# Patient Record
Sex: FEMALE | Race: BLACK OR AFRICAN AMERICAN | Employment: FULL TIME | URBAN - METROPOLITAN AREA
[De-identification: names, ages, dates, MRNs, and addresses within clinical notes are randomized per-mention and may not be internally consistent; named-entity substitution may affect disease eponyms.]

---

## 2017-01-07 ENCOUNTER — TELEPHONE (OUTPATIENT)
Dept: PRIMARY CARE CLINIC | Age: 47
End: 2017-01-07

## 2017-01-07 DIAGNOSIS — I15.9 SECONDARY HYPERTENSION, UNSPECIFIED: ICD-10-CM

## 2017-01-07 RX ORDER — LISINOPRIL AND HYDROCHLOROTHIAZIDE 20; 12.5 MG/1; MG/1
2 TABLET ORAL DAILY
Qty: 90 TABLET | Refills: 2 | Status: SHIPPED
Start: 2017-01-07 | End: 2017-01-09 | Stop reason: SDUPTHER

## 2017-01-09 ENCOUNTER — OFFICE VISIT (OUTPATIENT)
Dept: INTERNAL MEDICINE CLINIC | Age: 47
End: 2017-01-09

## 2017-01-09 VITALS
OXYGEN SATURATION: 98 % | BODY MASS INDEX: 43.41 KG/M2 | WEIGHT: 247 LBS | SYSTOLIC BLOOD PRESSURE: 132 MMHG | HEART RATE: 96 BPM | DIASTOLIC BLOOD PRESSURE: 92 MMHG

## 2017-01-09 DIAGNOSIS — J30.89 PERENNIAL ALLERGIC RHINITIS, UNSPECIFIED ALLERGIC RHINITIS TRIGGER: ICD-10-CM

## 2017-01-09 DIAGNOSIS — I10 ESSENTIAL HYPERTENSION: Primary | ICD-10-CM

## 2017-01-09 PROCEDURE — 99213 OFFICE O/P EST LOW 20 MIN: CPT | Performed by: INTERNAL MEDICINE

## 2017-01-09 RX ORDER — LISINOPRIL AND HYDROCHLOROTHIAZIDE 20; 12.5 MG/1; MG/1
1 TABLET ORAL DAILY
Qty: 90 TABLET | Refills: 2 | Status: SHIPPED | OUTPATIENT
Start: 2017-01-09 | End: 2017-02-20 | Stop reason: SINTOL

## 2017-01-09 RX ORDER — FLUTICASONE PROPIONATE 50 MCG
1 SPRAY, SUSPENSION (ML) NASAL DAILY
Qty: 1 BOTTLE | Refills: 5 | Status: SHIPPED | OUTPATIENT
Start: 2017-01-09 | End: 2017-08-14 | Stop reason: SDUPTHER

## 2017-01-18 ENCOUNTER — OFFICE VISIT (OUTPATIENT)
Dept: NEUROLOGY | Age: 47
End: 2017-01-18

## 2017-01-18 VITALS
HEART RATE: 107 BPM | WEIGHT: 249 LBS | BODY MASS INDEX: 44.12 KG/M2 | DIASTOLIC BLOOD PRESSURE: 94 MMHG | HEIGHT: 63 IN | SYSTOLIC BLOOD PRESSURE: 144 MMHG

## 2017-01-18 DIAGNOSIS — G93.2 PSEUDOTUMOR CEREBRI: Primary | ICD-10-CM

## 2017-01-18 PROCEDURE — 99213 OFFICE O/P EST LOW 20 MIN: CPT | Performed by: PSYCHIATRY & NEUROLOGY

## 2017-01-30 ENCOUNTER — OFFICE VISIT (OUTPATIENT)
Dept: INTERNAL MEDICINE CLINIC | Age: 47
End: 2017-01-30

## 2017-01-30 VITALS
SYSTOLIC BLOOD PRESSURE: 130 MMHG | OXYGEN SATURATION: 98 % | DIASTOLIC BLOOD PRESSURE: 84 MMHG | BODY MASS INDEX: 44.11 KG/M2 | HEART RATE: 94 BPM | WEIGHT: 249 LBS

## 2017-01-30 DIAGNOSIS — J30.2 SEASONAL ALLERGIC RHINITIS, UNSPECIFIED ALLERGIC RHINITIS TRIGGER: ICD-10-CM

## 2017-01-30 DIAGNOSIS — I10 ESSENTIAL HYPERTENSION: ICD-10-CM

## 2017-01-30 DIAGNOSIS — R00.2 PALPITATIONS: Primary | ICD-10-CM

## 2017-01-30 PROCEDURE — 99214 OFFICE O/P EST MOD 30 MIN: CPT | Performed by: INTERNAL MEDICINE

## 2017-01-30 RX ORDER — PREDNISONE 20 MG/1
20 TABLET ORAL DAILY
Qty: 5 TABLET | Refills: 0 | Status: SHIPPED | OUTPATIENT
Start: 2017-01-30 | End: 2017-02-04

## 2017-01-30 ASSESSMENT — ENCOUNTER SYMPTOMS
FACIAL SWELLING: 1
COUGH: 1
CHEST TIGHTNESS: 0
SORE THROAT: 0
RHINORRHEA: 1
CHOKING: 0
SINUS PRESSURE: 1

## 2017-02-08 ENCOUNTER — OFFICE VISIT (OUTPATIENT)
Dept: CARDIOLOGY CLINIC | Age: 47
End: 2017-02-08

## 2017-02-08 VITALS
OXYGEN SATURATION: 97 % | BODY MASS INDEX: 44.47 KG/M2 | WEIGHT: 251 LBS | SYSTOLIC BLOOD PRESSURE: 130 MMHG | DIASTOLIC BLOOD PRESSURE: 80 MMHG | HEIGHT: 63 IN | HEART RATE: 98 BPM

## 2017-02-08 DIAGNOSIS — R00.2 PALPITATIONS: Primary | ICD-10-CM

## 2017-02-08 DIAGNOSIS — R06.83 SNORES: ICD-10-CM

## 2017-02-08 DIAGNOSIS — I10 ESSENTIAL HYPERTENSION: ICD-10-CM

## 2017-02-08 DIAGNOSIS — I49.9 IRREGULAR HEART BEAT: ICD-10-CM

## 2017-02-08 DIAGNOSIS — E78.5 HYPERLIPIDEMIA, UNSPECIFIED HYPERLIPIDEMIA TYPE: ICD-10-CM

## 2017-02-08 PROCEDURE — 99244 OFF/OP CNSLTJ NEW/EST MOD 40: CPT | Performed by: INTERNAL MEDICINE

## 2017-02-08 PROCEDURE — 93000 ELECTROCARDIOGRAM COMPLETE: CPT | Performed by: INTERNAL MEDICINE

## 2017-02-14 PROCEDURE — 93224 XTRNL ECG REC UP TO 48 HRS: CPT | Performed by: INTERNAL MEDICINE

## 2017-02-17 ENCOUNTER — TELEPHONE (OUTPATIENT)
Dept: INTERNAL MEDICINE CLINIC | Age: 47
End: 2017-02-17

## 2017-02-20 RX ORDER — LOSARTAN POTASSIUM AND HYDROCHLOROTHIAZIDE 25; 100 MG/1; MG/1
1 TABLET ORAL DAILY
Qty: 90 TABLET | Refills: 3 | Status: CANCELLED | OUTPATIENT
Start: 2017-02-20

## 2017-02-27 ENCOUNTER — OFFICE VISIT (OUTPATIENT)
Dept: CARDIOLOGY CLINIC | Age: 47
End: 2017-02-27

## 2017-02-27 VITALS
SYSTOLIC BLOOD PRESSURE: 118 MMHG | HEART RATE: 89 BPM | DIASTOLIC BLOOD PRESSURE: 78 MMHG | WEIGHT: 250 LBS | BODY MASS INDEX: 44.29 KG/M2

## 2017-02-27 DIAGNOSIS — I10 ESSENTIAL HYPERTENSION: ICD-10-CM

## 2017-02-27 DIAGNOSIS — E78.5 HYPERLIPIDEMIA, UNSPECIFIED HYPERLIPIDEMIA TYPE: ICD-10-CM

## 2017-02-27 DIAGNOSIS — I49.3 PVC (PREMATURE VENTRICULAR CONTRACTION): ICD-10-CM

## 2017-02-27 DIAGNOSIS — R00.2 PALPITATIONS: Primary | ICD-10-CM

## 2017-02-27 PROCEDURE — 93000 ELECTROCARDIOGRAM COMPLETE: CPT | Performed by: INTERNAL MEDICINE

## 2017-02-27 PROCEDURE — 99214 OFFICE O/P EST MOD 30 MIN: CPT | Performed by: INTERNAL MEDICINE

## 2017-03-15 RX ORDER — LOSARTAN POTASSIUM AND HYDROCHLOROTHIAZIDE 25; 100 MG/1; MG/1
1 TABLET ORAL DAILY
Qty: 90 TABLET | Refills: 3 | Status: SHIPPED | OUTPATIENT
Start: 2017-03-15 | End: 2018-05-01 | Stop reason: SDUPTHER

## 2017-03-30 ENCOUNTER — OFFICE VISIT (OUTPATIENT)
Dept: INTERNAL MEDICINE CLINIC | Age: 47
End: 2017-03-30

## 2017-03-30 VITALS
BODY MASS INDEX: 44.11 KG/M2 | HEART RATE: 98 BPM | SYSTOLIC BLOOD PRESSURE: 142 MMHG | WEIGHT: 249 LBS | OXYGEN SATURATION: 99 % | DIASTOLIC BLOOD PRESSURE: 92 MMHG

## 2017-03-30 DIAGNOSIS — R05.9 COUGH: ICD-10-CM

## 2017-03-30 DIAGNOSIS — R00.2 PALPITATIONS: Primary | ICD-10-CM

## 2017-03-30 PROCEDURE — 99213 OFFICE O/P EST LOW 20 MIN: CPT | Performed by: INTERNAL MEDICINE

## 2017-03-30 RX ORDER — LEVOFLOXACIN 500 MG/1
500 TABLET, FILM COATED ORAL DAILY
Qty: 7 TABLET | Refills: 0 | Status: SHIPPED | OUTPATIENT
Start: 2017-03-30 | End: 2017-04-06

## 2017-03-30 RX ORDER — BENZONATATE 100 MG/1
100 CAPSULE ORAL 3 TIMES DAILY PRN
Qty: 21 CAPSULE | Refills: 0 | Status: SHIPPED | OUTPATIENT
Start: 2017-03-30 | End: 2017-04-06

## 2017-03-31 ASSESSMENT — ENCOUNTER SYMPTOMS
NAUSEA: 0
COUGH: 1
CHOKING: 0
CHEST TIGHTNESS: 0

## 2017-04-18 ENCOUNTER — TELEPHONE (OUTPATIENT)
Dept: INTERNAL MEDICINE CLINIC | Age: 47
End: 2017-04-18

## 2017-04-18 PROBLEM — J45.30 MILD PERSISTENT ASTHMA: Status: ACTIVE | Noted: 2017-04-18

## 2017-05-18 ENCOUNTER — OFFICE VISIT (OUTPATIENT)
Dept: PULMONOLOGY | Age: 47
End: 2017-05-18

## 2017-05-18 VITALS
RESPIRATION RATE: 18 BRPM | DIASTOLIC BLOOD PRESSURE: 92 MMHG | HEART RATE: 95 BPM | SYSTOLIC BLOOD PRESSURE: 159 MMHG | HEIGHT: 63 IN | BODY MASS INDEX: 44.3 KG/M2 | OXYGEN SATURATION: 99 % | WEIGHT: 250 LBS

## 2017-05-18 DIAGNOSIS — R05.3 CHRONIC COUGH: Primary | ICD-10-CM

## 2017-05-18 DIAGNOSIS — G47.33 OSA (OBSTRUCTIVE SLEEP APNEA): ICD-10-CM

## 2017-05-18 DIAGNOSIS — R94.2 ABNORMAL PFT: ICD-10-CM

## 2017-05-18 DIAGNOSIS — K21.9 GASTROESOPHAGEAL REFLUX DISEASE, ESOPHAGITIS PRESENCE NOT SPECIFIED: ICD-10-CM

## 2017-05-18 PROCEDURE — 99244 OFF/OP CNSLTJ NEW/EST MOD 40: CPT | Performed by: INTERNAL MEDICINE

## 2017-05-18 RX ORDER — ESOMEPRAZOLE MAGNESIUM 40 MG/1
40 CAPSULE, DELAYED RELEASE ORAL
Qty: 30 CAPSULE | Refills: 3 | Status: SHIPPED | OUTPATIENT
Start: 2017-05-18 | End: 2017-06-22

## 2017-05-23 ENCOUNTER — INITIAL CONSULT (OUTPATIENT)
Dept: SLEEP MEDICINE | Age: 47
End: 2017-05-23

## 2017-05-23 VITALS
BODY MASS INDEX: 43.94 KG/M2 | SYSTOLIC BLOOD PRESSURE: 110 MMHG | DIASTOLIC BLOOD PRESSURE: 70 MMHG | HEART RATE: 97 BPM | WEIGHT: 248 LBS | OXYGEN SATURATION: 98 % | HEIGHT: 63 IN

## 2017-05-23 DIAGNOSIS — J45.30 MILD PERSISTENT ASTHMA WITHOUT COMPLICATION: Chronic | ICD-10-CM

## 2017-05-23 DIAGNOSIS — E66.01 MORBID OBESITY DUE TO EXCESS CALORIES (HCC): Chronic | ICD-10-CM

## 2017-05-23 DIAGNOSIS — I10 HYPERTENSION, ESSENTIAL: Chronic | ICD-10-CM

## 2017-05-23 DIAGNOSIS — G47.10 HYPERSOMNIA: Primary | ICD-10-CM

## 2017-05-23 DIAGNOSIS — G93.2 PSEUDOTUMOR CEREBRI: Chronic | ICD-10-CM

## 2017-05-23 DIAGNOSIS — R06.83 SNORING: ICD-10-CM

## 2017-05-23 PROBLEM — I49.3 PVC (PREMATURE VENTRICULAR CONTRACTION): Chronic | Status: ACTIVE | Noted: 2017-02-27

## 2017-05-23 PROCEDURE — 99244 OFF/OP CNSLTJ NEW/EST MOD 40: CPT | Performed by: INTERNAL MEDICINE

## 2017-05-23 ASSESSMENT — SLEEP AND FATIGUE QUESTIONNAIRES
HOW LIKELY ARE YOU TO NOD OFF OR FALL ASLEEP IN A CAR, WHILE STOPPED FOR A FEW MINUTES IN TRAFFIC: 0
NECK CIRCUMFERENCE (INCHES): 16
HOW LIKELY ARE YOU TO NOD OFF OR FALL ASLEEP WHILE SITTING AND TALKING TO SOMEONE: 0
HOW LIKELY ARE YOU TO NOD OFF OR FALL ASLEEP WHILE SITTING AND READING: 2
HOW LIKELY ARE YOU TO NOD OFF OR FALL ASLEEP WHILE WATCHING TV: 2
HOW LIKELY ARE YOU TO NOD OFF OR FALL ASLEEP WHILE SITTING QUIETLY AFTER LUNCH WITHOUT ALCOHOL: 1
HOW LIKELY ARE YOU TO NOD OFF OR FALL ASLEEP WHEN YOU ARE A PASSENGER IN A CAR FOR AN HOUR WITHOUT A BREAK: 3
ESS TOTAL SCORE: 11
HOW LIKELY ARE YOU TO NOD OFF OR FALL ASLEEP WHILE LYING DOWN TO REST IN THE AFTERNOON WHEN CIRCUMSTANCES PERMIT: 3
HOW LIKELY ARE YOU TO NOD OFF OR FALL ASLEEP WHILE SITTING INACTIVE IN A PUBLIC PLACE: 0

## 2017-05-23 ASSESSMENT — ENCOUNTER SYMPTOMS
NAUSEA: 0
ABDOMINAL DISTENTION: 0
CHOKING: 0
APNEA: 1
CHEST TIGHTNESS: 0
RHINORRHEA: 0
VOMITING: 0
SHORTNESS OF BREATH: 0
PHOTOPHOBIA: 0
ABDOMINAL PAIN: 0
ALLERGIC/IMMUNOLOGIC NEGATIVE: 1
EYE PAIN: 0

## 2017-06-01 ENCOUNTER — HOSPITAL ENCOUNTER (OUTPATIENT)
Dept: PULMONOLOGY | Age: 47
Discharge: HOME | End: 2017-06-01
Attending: INTERNAL MEDICINE | Admitting: INTERNAL MEDICINE

## 2017-06-01 ENCOUNTER — HOSPITAL ENCOUNTER (OUTPATIENT)
Dept: CT IMAGING | Age: 47
Discharge: HOME | End: 2017-06-01
Attending: INTERNAL MEDICINE | Admitting: INTERNAL MEDICINE

## 2017-06-01 VITALS — RESPIRATION RATE: 16 BRPM | HEART RATE: 68 BPM | OXYGEN SATURATION: 97 %

## 2017-06-01 DIAGNOSIS — R05.9 COUGH: ICD-10-CM

## 2017-06-01 RX ORDER — SODIUM CHLORIDE FOR INHALATION 0.9 %
3 VIAL, NEBULIZER (ML) INHALATION ONCE
Status: COMPLETED | OUTPATIENT
Start: 2017-06-01 | End: 2017-06-01

## 2017-06-01 RX ORDER — ALBUTEROL SULFATE 2.5 MG/3ML
2.5 SOLUTION RESPIRATORY (INHALATION) ONCE
Status: COMPLETED | OUTPATIENT
Start: 2017-06-01 | End: 2017-06-01

## 2017-06-01 RX ADMIN — Medication 3 ML: at 08:34

## 2017-06-01 RX ADMIN — ALBUTEROL SULFATE 2.5 MG: 2.5 SOLUTION RESPIRATORY (INHALATION) at 09:53

## 2017-06-13 ENCOUNTER — HOSPITAL ENCOUNTER (OUTPATIENT)
Dept: SLEEP MEDICINE | Age: 47
Discharge: HOME | End: 2017-06-30
Admitting: INTERNAL MEDICINE

## 2017-06-13 DIAGNOSIS — R06.83 SNORING: ICD-10-CM

## 2017-06-13 DIAGNOSIS — G47.10 HYPERSOMNIA: ICD-10-CM

## 2017-06-13 PROCEDURE — 95806 SLEEP STUDY UNATT&RESP EFFT: CPT | Performed by: INTERNAL MEDICINE

## 2017-06-20 ENCOUNTER — TELEPHONE (OUTPATIENT)
Dept: SLEEP MEDICINE | Age: 47
End: 2017-06-20

## 2017-06-22 ENCOUNTER — OFFICE VISIT (OUTPATIENT)
Dept: PULMONOLOGY | Age: 47
End: 2017-06-22

## 2017-06-22 VITALS
HEART RATE: 91 BPM | WEIGHT: 250 LBS | BODY MASS INDEX: 44.3 KG/M2 | SYSTOLIC BLOOD PRESSURE: 139 MMHG | DIASTOLIC BLOOD PRESSURE: 93 MMHG | RESPIRATION RATE: 80 BRPM | HEIGHT: 63 IN | OXYGEN SATURATION: 97 %

## 2017-06-22 DIAGNOSIS — K21.9 GASTROESOPHAGEAL REFLUX DISEASE, ESOPHAGITIS PRESENCE NOT SPECIFIED: ICD-10-CM

## 2017-06-22 DIAGNOSIS — G47.33 OSA (OBSTRUCTIVE SLEEP APNEA): ICD-10-CM

## 2017-06-22 DIAGNOSIS — R05.3 CHRONIC COUGH: Primary | ICD-10-CM

## 2017-06-22 PROCEDURE — 99213 OFFICE O/P EST LOW 20 MIN: CPT | Performed by: INTERNAL MEDICINE

## 2017-08-14 ENCOUNTER — OFFICE VISIT (OUTPATIENT)
Dept: INTERNAL MEDICINE CLINIC | Age: 47
End: 2017-08-14

## 2017-08-14 VITALS
DIASTOLIC BLOOD PRESSURE: 82 MMHG | SYSTOLIC BLOOD PRESSURE: 114 MMHG | HEART RATE: 96 BPM | BODY MASS INDEX: 42.35 KG/M2 | WEIGHT: 241 LBS | OXYGEN SATURATION: 98 %

## 2017-08-14 DIAGNOSIS — J31.0 PURULENT RHINITIS: Primary | ICD-10-CM

## 2017-08-14 PROCEDURE — 99213 OFFICE O/P EST LOW 20 MIN: CPT | Performed by: INTERNAL MEDICINE

## 2017-08-14 RX ORDER — AZITHROMYCIN 250 MG/1
TABLET, FILM COATED ORAL
Qty: 1 PACKET | Refills: 0 | Status: SHIPPED | OUTPATIENT
Start: 2017-08-14 | End: 2017-08-24

## 2017-08-14 RX ORDER — FLUTICASONE PROPIONATE 50 MCG
1 SPRAY, SUSPENSION (ML) NASAL DAILY
Qty: 1 BOTTLE | Refills: 5 | Status: SHIPPED | OUTPATIENT
Start: 2017-08-14 | End: 2019-02-22

## 2017-08-15 ASSESSMENT — ENCOUNTER SYMPTOMS
COUGH: 1
SORE THROAT: 0
WHEEZING: 0

## 2017-08-18 ENCOUNTER — OFFICE VISIT (OUTPATIENT)
Dept: NEUROLOGY | Age: 47
End: 2017-08-18

## 2017-08-18 VITALS
WEIGHT: 246 LBS | HEART RATE: 90 BPM | SYSTOLIC BLOOD PRESSURE: 143 MMHG | BODY MASS INDEX: 43.59 KG/M2 | DIASTOLIC BLOOD PRESSURE: 81 MMHG | HEIGHT: 63 IN

## 2017-08-18 DIAGNOSIS — G93.2 PSEUDOTUMOR CEREBRI: Primary | Chronic | ICD-10-CM

## 2017-08-18 PROCEDURE — 99213 OFFICE O/P EST LOW 20 MIN: CPT | Performed by: PSYCHIATRY & NEUROLOGY

## 2017-08-18 RX ORDER — ACETAZOLAMIDE 250 MG/1
TABLET ORAL
Qty: 180 TABLET | Refills: 1 | Status: SHIPPED | OUTPATIENT
Start: 2017-08-18 | End: 2018-07-17 | Stop reason: SDUPTHER

## 2017-09-07 ENCOUNTER — PATIENT MESSAGE (OUTPATIENT)
Dept: INTERNAL MEDICINE CLINIC | Age: 47
End: 2017-09-07

## 2017-09-07 RX ORDER — LEVOFLOXACIN 500 MG/1
500 TABLET, FILM COATED ORAL DAILY
Qty: 10 TABLET | Refills: 0 | Status: SHIPPED | OUTPATIENT
Start: 2017-09-07 | End: 2017-09-17

## 2017-09-07 RX ORDER — PROMETHAZINE HYDROCHLORIDE AND PHENYLEPHRINE HYDROCHLORIDE 6.25; 5 MG/5ML; MG/5ML
5 SYRUP ORAL 4 TIMES DAILY PRN
Qty: 118 ML | Refills: 1 | Status: SHIPPED | OUTPATIENT
Start: 2017-09-07 | End: 2018-09-20 | Stop reason: SDUPTHER

## 2017-09-12 ENCOUNTER — OFFICE VISIT (OUTPATIENT)
Dept: SLEEP MEDICINE | Age: 47
End: 2017-09-12

## 2017-09-12 VITALS
OXYGEN SATURATION: 98 % | WEIGHT: 248 LBS | HEART RATE: 95 BPM | SYSTOLIC BLOOD PRESSURE: 129 MMHG | BODY MASS INDEX: 43.94 KG/M2 | HEIGHT: 63 IN | DIASTOLIC BLOOD PRESSURE: 86 MMHG

## 2017-09-12 DIAGNOSIS — G47.33 OBSTRUCTIVE SLEEP APNEA SYNDROME: Primary | ICD-10-CM

## 2017-09-12 DIAGNOSIS — I10 HYPERTENSION, ESSENTIAL: Chronic | ICD-10-CM

## 2017-09-12 DIAGNOSIS — E66.01 MORBID OBESITY DUE TO EXCESS CALORIES (HCC): Chronic | ICD-10-CM

## 2017-09-12 DIAGNOSIS — J45.30 MILD PERSISTENT ASTHMA WITHOUT COMPLICATION: Chronic | ICD-10-CM

## 2017-09-12 DIAGNOSIS — G93.2 PSEUDOTUMOR CEREBRI: Chronic | ICD-10-CM

## 2017-09-12 DIAGNOSIS — K21.9 GASTROESOPHAGEAL REFLUX DISEASE, ESOPHAGITIS PRESENCE NOT SPECIFIED: Chronic | ICD-10-CM

## 2017-09-12 PROCEDURE — 99214 OFFICE O/P EST MOD 30 MIN: CPT | Performed by: INTERNAL MEDICINE

## 2017-09-12 ASSESSMENT — SLEEP AND FATIGUE QUESTIONNAIRES
HOW LIKELY ARE YOU TO NOD OFF OR FALL ASLEEP WHILE SITTING AND TALKING TO SOMEONE: 0
HOW LIKELY ARE YOU TO NOD OFF OR FALL ASLEEP WHILE SITTING QUIETLY AFTER LUNCH WITHOUT ALCOHOL: 0
HOW LIKELY ARE YOU TO NOD OFF OR FALL ASLEEP WHEN YOU ARE A PASSENGER IN A CAR FOR AN HOUR WITHOUT A BREAK: 1
HOW LIKELY ARE YOU TO NOD OFF OR FALL ASLEEP WHILE LYING DOWN TO REST IN THE AFTERNOON WHEN CIRCUMSTANCES PERMIT: 0
HOW LIKELY ARE YOU TO NOD OFF OR FALL ASLEEP IN A CAR, WHILE STOPPED FOR A FEW MINUTES IN TRAFFIC: 0
HOW LIKELY ARE YOU TO NOD OFF OR FALL ASLEEP WHILE SITTING INACTIVE IN A PUBLIC PLACE: 0
HOW LIKELY ARE YOU TO NOD OFF OR FALL ASLEEP WHILE SITTING AND READING: 0
HOW LIKELY ARE YOU TO NOD OFF OR FALL ASLEEP WHILE WATCHING TV: 0
ESS TOTAL SCORE: 1

## 2017-09-12 ASSESSMENT — ENCOUNTER SYMPTOMS
COUGH: 1
APNEA: 0
SHORTNESS OF BREATH: 0
CHOKING: 0
RHINORRHEA: 0

## 2017-09-13 ENCOUNTER — TELEPHONE (OUTPATIENT)
Dept: SLEEP MEDICINE | Age: 47
End: 2017-09-13

## 2017-10-17 ENCOUNTER — HOSPITAL ENCOUNTER (OUTPATIENT)
Dept: SLEEP MEDICINE | Age: 47
Discharge: HOME | End: 2017-10-19
Attending: INTERNAL MEDICINE | Admitting: INTERNAL MEDICINE

## 2017-10-17 DIAGNOSIS — G47.33 OBSTRUCTIVE SLEEP APNEA SYNDROME: ICD-10-CM

## 2017-10-21 PROCEDURE — 95811 POLYSOM 6/>YRS CPAP 4/> PARM: CPT | Performed by: INTERNAL MEDICINE

## 2017-10-24 ENCOUNTER — TELEPHONE (OUTPATIENT)
Dept: SLEEP MEDICINE | Age: 47
End: 2017-10-24

## 2018-01-22 ENCOUNTER — OFFICE VISIT (OUTPATIENT)
Dept: SLEEP MEDICINE | Age: 48
End: 2018-01-22

## 2018-01-22 VITALS
SYSTOLIC BLOOD PRESSURE: 124 MMHG | HEIGHT: 63 IN | DIASTOLIC BLOOD PRESSURE: 80 MMHG | RESPIRATION RATE: 16 BRPM | HEART RATE: 84 BPM | OXYGEN SATURATION: 96 % | WEIGHT: 244 LBS | BODY MASS INDEX: 43.23 KG/M2

## 2018-01-22 DIAGNOSIS — E66.01 MORBID OBESITY DUE TO EXCESS CALORIES (HCC): Chronic | ICD-10-CM

## 2018-01-22 DIAGNOSIS — J45.30 MILD PERSISTENT ASTHMA WITHOUT COMPLICATION: Chronic | ICD-10-CM

## 2018-01-22 DIAGNOSIS — K21.9 GASTROESOPHAGEAL REFLUX DISEASE, ESOPHAGITIS PRESENCE NOT SPECIFIED: Chronic | ICD-10-CM

## 2018-01-22 DIAGNOSIS — G47.33 OBSTRUCTIVE SLEEP APNEA SYNDROME: Primary | Chronic | ICD-10-CM

## 2018-01-22 DIAGNOSIS — I10 HYPERTENSION, ESSENTIAL: Chronic | ICD-10-CM

## 2018-01-22 PROCEDURE — 99214 OFFICE O/P EST MOD 30 MIN: CPT | Performed by: NURSE PRACTITIONER

## 2018-01-22 ASSESSMENT — ENCOUNTER SYMPTOMS
APNEA: 0
RHINORRHEA: 0
SINUS PRESSURE: 0
COUGH: 0
SHORTNESS OF BREATH: 0
ABDOMINAL DISTENTION: 0
ABDOMINAL PAIN: 0

## 2018-01-22 ASSESSMENT — SLEEP AND FATIGUE QUESTIONNAIRES
HOW LIKELY ARE YOU TO NOD OFF OR FALL ASLEEP WHILE SITTING AND TALKING TO SOMEONE: 0
ESS TOTAL SCORE: 7
HOW LIKELY ARE YOU TO NOD OFF OR FALL ASLEEP WHILE LYING DOWN TO REST IN THE AFTERNOON WHEN CIRCUMSTANCES PERMIT: 2
HOW LIKELY ARE YOU TO NOD OFF OR FALL ASLEEP WHILE SITTING AND READING: 1
HOW LIKELY ARE YOU TO NOD OFF OR FALL ASLEEP WHILE SITTING QUIETLY AFTER LUNCH WITHOUT ALCOHOL: 0
HOW LIKELY ARE YOU TO NOD OFF OR FALL ASLEEP WHILE SITTING INACTIVE IN A PUBLIC PLACE: 1
HOW LIKELY ARE YOU TO NOD OFF OR FALL ASLEEP IN A CAR, WHILE STOPPED FOR A FEW MINUTES IN TRAFFIC: 0
HOW LIKELY ARE YOU TO NOD OFF OR FALL ASLEEP WHILE WATCHING TV: 1
HOW LIKELY ARE YOU TO NOD OFF OR FALL ASLEEP WHEN YOU ARE A PASSENGER IN A CAR FOR AN HOUR WITHOUT A BREAK: 2

## 2018-02-09 ENCOUNTER — OFFICE VISIT (OUTPATIENT)
Dept: INTERNAL MEDICINE CLINIC | Age: 48
End: 2018-02-09

## 2018-02-09 VITALS
HEART RATE: 101 BPM | WEIGHT: 248 LBS | SYSTOLIC BLOOD PRESSURE: 118 MMHG | OXYGEN SATURATION: 99 % | BODY MASS INDEX: 43.93 KG/M2 | DIASTOLIC BLOOD PRESSURE: 76 MMHG

## 2018-02-09 DIAGNOSIS — E78.5 HYPERLIPIDEMIA, UNSPECIFIED HYPERLIPIDEMIA TYPE: Chronic | ICD-10-CM

## 2018-02-09 DIAGNOSIS — Z00.00 WELL ADULT EXAM: Primary | ICD-10-CM

## 2018-02-09 DIAGNOSIS — I10 ESSENTIAL HYPERTENSION: Chronic | ICD-10-CM

## 2018-02-09 PROCEDURE — 99396 PREV VISIT EST AGE 40-64: CPT | Performed by: INTERNAL MEDICINE

## 2018-02-09 ASSESSMENT — PATIENT HEALTH QUESTIONNAIRE - PHQ9
1. LITTLE INTEREST OR PLEASURE IN DOING THINGS: 0
SUM OF ALL RESPONSES TO PHQ9 QUESTIONS 1 & 2: 0
2. FEELING DOWN, DEPRESSED OR HOPELESS: 0
SUM OF ALL RESPONSES TO PHQ QUESTIONS 1-9: 0

## 2018-02-12 NOTE — PROGRESS NOTES
History and Physical      Jessica Curry  YOB: 1970    Date of Service:  2/9/2018    Chief Complaint:   Jessica Curry is a 52 y.o. female who presents for complete physical examination. HPI: patient comes in for physical. She is very stressed because her son has been incarcerated. She is  Very stressed about this. She is not exercising but is eating better. Her job is going well.     Wt Readings from Last 3 Encounters:   02/09/18 248 lb (112.5 kg)   01/22/18 244 lb (110.7 kg)   11/27/17 240 lb (108.9 kg)     BP Readings from Last 3 Encounters:   02/09/18 118/76   01/22/18 124/80   11/27/17 124/81       Patient Active Problem List   Diagnosis    Hypertension    Hyperlipidemia    Morbid obesity ([facility] [address]    Pseudotumor cerebri    Benign intracranial hypertension    Palpitations    PVC (premature ventricular contraction)    Mild persistent asthma    Obstructive sleep apnea syndrome       Preventive Care:  Health Maintenance   Topic Date Due    Pneumococcal med risk (1 of 1 - PPSV23) 06/28/1989    Diabetes screen  12/20/2014    Flu vaccine (1) 09/01/2017    Potassium monitoring  11/27/2018    Creatinine monitoring  11/27/2018    Lipid screen  07/06/2021    DTaP/Tdap/Td vaccine (2 - Td) 02/06/2025    HIV screen  Completed      Hx abnormal PAP: no  Sexual activity: single partner, contraception - none   Self-breast exams: yes  Previous DEXA scan: no  Last eye exam: 2016, normal  Exercise: no regular exercise  Seatbelt use: always  Lipid panel:   Lab Results   Component Value Date    CHOL 237 (H) 07/06/2016    TRIG 126 07/06/2016    HDL 38 (L) 07/06/2016    LDLCALC 174 (H) 07/06/2016        Advance Directive: Y, Not Received    Immunization History   Administered Date(s) Administered    Td 01/01/2005    Tdap (Boostrix, Adacel) 02/06/2015       Allergies   Allergen Reactions    Lisinopril      Outpatient Prescriptions Marked as Taking for the 2/9/18 encounter (Office Visit) with Oral Minors

## 2018-02-16 ENCOUNTER — OFFICE VISIT (OUTPATIENT)
Dept: NEUROLOGY | Age: 48
End: 2018-02-16

## 2018-02-16 VITALS
WEIGHT: 251.6 LBS | HEIGHT: 63 IN | SYSTOLIC BLOOD PRESSURE: 131 MMHG | DIASTOLIC BLOOD PRESSURE: 76 MMHG | BODY MASS INDEX: 44.58 KG/M2

## 2018-02-16 DIAGNOSIS — G93.2 PSEUDOTUMOR CEREBRI: Chronic | ICD-10-CM

## 2018-02-16 PROCEDURE — 99213 OFFICE O/P EST LOW 20 MIN: CPT | Performed by: PSYCHIATRY & NEUROLOGY

## 2018-02-16 NOTE — PATIENT INSTRUCTIONS
Please call with any questions or concerns:   [facility]  @ [phone]. LAB RESULTS:  Please obtain any labs or diagnostic tests as discussed today. You should call the office to check the results. Please allow  3 to 7 days for us to get these results. MEDICATION LIST:  Please bring an accurate list of your medications to every visit. APPOINTMENT CONFIRMATION:  We will call you the day before your scheduled appointment to confirm. If we are unable to reach you, you MUST call back by the end of the day to confirm the appointment or we may be forced to cancel.

## 2018-02-16 NOTE — PROGRESS NOTES
Objective:  Exam:  /76 (Site: Left Arm, Position: Sitting, Cuff Size: Large Adult)   Ht 5' 3\" (1.6 m)   Wt 251 lb 9.6 oz (114.1 kg)   BMI 44.57 kg/m²   This is a well-nourished patient in no acute distress  Patient is awake, alert and oriented x3. Speech is normal.  Pupils are equal round reacting to light. Extraocular movements intact. Face symmetrical. Tongue midline. Motor exam shows normal symmetrical strength. Deep tendon reflexes normal. Plantar reflexes downgoing. Sensory exam normal. Coordination normal. Gait normal. No carotid bruit. No neck stiffness. Impression :  Pseudotumor cerebri, stable    Plan :  Continue acetazolamide 250 mg, one tablet b.i.d. Recommended that patient continue to lose weight        Please note a portion of  this chart was generated using dragon dictation software. Although every effort was made to ensure the accuracy of this automated transcription, some errors in transcription may have occurred.

## 2018-02-22 ENCOUNTER — HOSPITAL ENCOUNTER (OUTPATIENT)
Dept: OTHER | Age: 48
Discharge: HOME | End: 2018-02-22
Attending: INTERNAL MEDICINE | Admitting: INTERNAL MEDICINE

## 2018-02-22 LAB
A/G RATIO: 1.3 (ref 1.1–2.2)
ALBUMIN SERPL-MCNC: 4.1 G/DL (ref 3.4–5)
ALP BLD-CCNC: 66 U/L (ref 40–129)
ALT SERPL-CCNC: 14 U/L (ref 10–40)
ANION GAP SERPL CALCULATED.3IONS-SCNC: 12 MMOL/L (ref 3–16)
AST SERPL-CCNC: 13 U/L (ref 15–37)
BILIRUB SERPL-MCNC: 0.3 MG/DL (ref 0–1)
BUN BLDV-MCNC: 18 MG/DL (ref 7–20)
CALCIUM SERPL-MCNC: 9.1 MG/DL (ref 8.3–10.6)
CHLORIDE BLD-SCNC: 100 MMOL/L (ref 99–110)
CHOLESTEROL, TOTAL: 224 MG/DL (ref 0–199)
CO2: 22 MMOL/L (ref 21–32)
CREAT SERPL-MCNC: 0.9 MG/DL (ref 0.6–1.1)
GFR AFRICAN AMERICAN: >60
GFR NON-AFRICAN AMERICAN: >60
GLOBULIN: 3.1 G/DL
GLUCOSE BLD-MCNC: 127 MG/DL (ref 70–99)
HDLC SERPL-MCNC: 35 MG/DL (ref 40–60)
LDL CHOLESTEROL CALCULATED: 161 MG/DL
POTASSIUM SERPL-SCNC: 3.6 MMOL/L (ref 3.5–5.1)
SODIUM BLD-SCNC: 134 MMOL/L (ref 136–145)
TOTAL PROTEIN: 7.2 G/DL (ref 6.4–8.2)
TRIGL SERPL-MCNC: 139 MG/DL (ref 0–150)
VLDLC SERPL CALC-MCNC: 28 MG/DL

## 2018-02-28 ENCOUNTER — PATIENT MESSAGE (OUTPATIENT)
Dept: INTERNAL MEDICINE CLINIC | Age: 48
End: 2018-02-28

## 2018-02-28 DIAGNOSIS — R73.9 HYPERGLYCEMIA: Primary | ICD-10-CM

## 2018-02-28 NOTE — TELEPHONE ENCOUNTER
From: Dai Bee  To: Monet Diggs MD  Sent: 2/28/2018 10:20 AM EST  Subject: Test Results Question    Helanalia Richmond,    I notice on my test results that it appears that my Glucose level is a little high. I looked at the trend and this is the highest it has been. I know that diabetes run in my family. Should I be concerned? Should we address this now rather than later? I am working on my life change and have made significant changes. So if I can get this and my weight under control know, I can prevent insulin. Please advise how we should handle.     Thanks,  Alexa Briscoe

## 2018-03-26 ENCOUNTER — PATIENT MESSAGE (OUTPATIENT)
Dept: INTERNAL MEDICINE CLINIC | Age: 48
End: 2018-03-26

## 2018-03-27 RX ORDER — CETIRIZINE HYDROCHLORIDE 10 MG/1
10 TABLET ORAL DAILY
Qty: 30 TABLET | Refills: 5 | Status: SHIPPED | OUTPATIENT
Start: 2018-03-27 | End: 2021-09-28

## 2018-03-27 NOTE — TELEPHONE ENCOUNTER
From: Frandy Callejas  To: Tejinder Horton MD  Sent: 3/26/2018 10:05 PM EDT  Subject: Prescription Question    please prescribe me something for allergy . .. last prescription was cetirizine tablets . ..

## 2018-04-23 ENCOUNTER — TELEPHONE (OUTPATIENT)
Dept: INTERNAL MEDICINE CLINIC | Age: 48
End: 2018-04-23

## 2018-04-25 RX ORDER — NAPROXEN 500 MG/1
500 TABLET ORAL 2 TIMES DAILY WITH MEALS
Qty: 60 TABLET | Refills: 3 | Status: SHIPPED | OUTPATIENT
Start: 2018-04-25 | End: 2018-10-05 | Stop reason: ALTCHOICE

## 2018-05-02 RX ORDER — LOSARTAN POTASSIUM AND HYDROCHLOROTHIAZIDE 25; 100 MG/1; MG/1
TABLET ORAL
Qty: 90 TABLET | Refills: 3 | Status: SHIPPED | OUTPATIENT
Start: 2018-05-02 | End: 2018-11-20

## 2018-05-04 ENCOUNTER — OFFICE VISIT (OUTPATIENT)
Dept: SLEEP MEDICINE | Age: 48
End: 2018-05-04

## 2018-05-04 VITALS
HEART RATE: 93 BPM | DIASTOLIC BLOOD PRESSURE: 72 MMHG | HEIGHT: 63 IN | WEIGHT: 247.8 LBS | OXYGEN SATURATION: 97 % | SYSTOLIC BLOOD PRESSURE: 128 MMHG | BODY MASS INDEX: 43.91 KG/M2

## 2018-05-04 DIAGNOSIS — G47.33 OBSTRUCTIVE SLEEP APNEA SYNDROME: Primary | ICD-10-CM

## 2018-05-04 DIAGNOSIS — K21.9 GASTROESOPHAGEAL REFLUX DISEASE, ESOPHAGITIS PRESENCE NOT SPECIFIED: Chronic | ICD-10-CM

## 2018-05-04 DIAGNOSIS — J45.30 MILD PERSISTENT ASTHMA WITHOUT COMPLICATION: Chronic | ICD-10-CM

## 2018-05-04 DIAGNOSIS — I10 HYPERTENSION, ESSENTIAL: Chronic | ICD-10-CM

## 2018-05-04 DIAGNOSIS — E66.01 MORBID OBESITY DUE TO EXCESS CALORIES (HCC): Chronic | ICD-10-CM

## 2018-05-04 PROCEDURE — 99214 OFFICE O/P EST MOD 30 MIN: CPT | Performed by: NURSE PRACTITIONER

## 2018-05-04 ASSESSMENT — ENCOUNTER SYMPTOMS
APNEA: 0
ABDOMINAL DISTENTION: 0
ABDOMINAL PAIN: 0
COUGH: 0
SHORTNESS OF BREATH: 0
SINUS PRESSURE: 0
RHINORRHEA: 0

## 2018-05-04 ASSESSMENT — SLEEP AND FATIGUE QUESTIONNAIRES
HOW LIKELY ARE YOU TO NOD OFF OR FALL ASLEEP WHILE SITTING AND READING: 1
HOW LIKELY ARE YOU TO NOD OFF OR FALL ASLEEP WHILE SITTING INACTIVE IN A PUBLIC PLACE: 1
HOW LIKELY ARE YOU TO NOD OFF OR FALL ASLEEP WHILE LYING DOWN TO REST IN THE AFTERNOON WHEN CIRCUMSTANCES PERMIT: 1
HOW LIKELY ARE YOU TO NOD OFF OR FALL ASLEEP WHEN YOU ARE A PASSENGER IN A CAR FOR AN HOUR WITHOUT A BREAK: 1
HOW LIKELY ARE YOU TO NOD OFF OR FALL ASLEEP WHILE SITTING AND TALKING TO SOMEONE: 0
HOW LIKELY ARE YOU TO NOD OFF OR FALL ASLEEP WHILE WATCHING TV: 1
HOW LIKELY ARE YOU TO NOD OFF OR FALL ASLEEP IN A CAR, WHILE STOPPED FOR A FEW MINUTES IN TRAFFIC: 0
ESS TOTAL SCORE: 5
HOW LIKELY ARE YOU TO NOD OFF OR FALL ASLEEP WHILE SITTING QUIETLY AFTER LUNCH WITHOUT ALCOHOL: 0

## 2018-07-17 DIAGNOSIS — G93.2 PSEUDOTUMOR CEREBRI: Chronic | ICD-10-CM

## 2018-07-17 RX ORDER — ACETAZOLAMIDE 250 MG/1
TABLET ORAL
Qty: 180 TABLET | Refills: 0 | Status: SHIPPED | OUTPATIENT
Start: 2018-07-17 | End: 2018-09-27 | Stop reason: SDUPTHER

## 2018-09-19 ENCOUNTER — PATIENT MESSAGE (OUTPATIENT)
Dept: INTERNAL MEDICINE CLINIC | Age: 48
End: 2018-09-19

## 2018-09-20 ENCOUNTER — TELEPHONE (OUTPATIENT)
Dept: INTERNAL MEDICINE CLINIC | Age: 48
End: 2018-09-20

## 2018-09-20 RX ORDER — PROMETHAZINE HYDROCHLORIDE AND PHENYLEPHRINE HYDROCHLORIDE 6.25; 5 MG/5ML; MG/5ML
5 SYRUP ORAL 4 TIMES DAILY PRN
Qty: 118 ML | Refills: 1 | Status: SHIPPED | OUTPATIENT
Start: 2018-09-20 | End: 2018-09-27

## 2018-09-20 RX ORDER — AZITHROMYCIN 250 MG/1
TABLET, FILM COATED ORAL
Qty: 1 PACKET | Refills: 0 | Status: SHIPPED | OUTPATIENT
Start: 2018-09-20 | End: 2018-09-24

## 2018-09-27 ENCOUNTER — TELEPHONE (OUTPATIENT)
Dept: INTERNAL MEDICINE CLINIC | Age: 48
End: 2018-09-27

## 2018-09-27 ENCOUNTER — OFFICE VISIT (OUTPATIENT)
Dept: NEUROLOGY | Age: 48
End: 2018-09-27
Payer: COMMERCIAL

## 2018-09-27 VITALS
HEART RATE: 91 BPM | DIASTOLIC BLOOD PRESSURE: 96 MMHG | BODY MASS INDEX: 43.23 KG/M2 | SYSTOLIC BLOOD PRESSURE: 149 MMHG | HEIGHT: 63 IN | WEIGHT: 244 LBS

## 2018-09-27 DIAGNOSIS — G93.2 PSEUDOTUMOR CEREBRI: Chronic | ICD-10-CM

## 2018-09-27 PROCEDURE — 99213 OFFICE O/P EST LOW 20 MIN: CPT | Performed by: PSYCHIATRY & NEUROLOGY

## 2018-09-27 RX ORDER — ACETAZOLAMIDE 250 MG/1
TABLET ORAL
Qty: 180 TABLET | Refills: 1 | Status: SHIPPED | OUTPATIENT
Start: 2018-09-27 | End: 2019-04-15 | Stop reason: SDUPTHER

## 2018-09-27 NOTE — TELEPHONE ENCOUNTER
Patient calling requesting a stronger medication for cough    Patient states she can not stop coughing causing her vomit     Please advise

## 2018-09-27 NOTE — PROGRESS NOTES
Jeanine Griffin   Neurology followup    Subjective:   CC/HP  History was obtained from the patient. Interval history:  Patient is doing well without any headaches. She has complained of some dry eyes. Patient is taking her ophthalmologist and he feels her eyes are stable   No other neurological complaints  Details of her history:  Patient has known pseudotumor cerebri.   Patient was seen by her optometrist in July 2017 and was found to have very mild papilledema bilaterally  Lumbar puncture in 2011 head showed an opening pressure of 35 cm of CSF but the diagnosis was made        REVIEW OF SYSTEMS    Constitutional:  []   Chills   []  Fatigue   []  Fevers   []  Malaise   []  Weight loss     [x] Denies all of the above    Respiratory:   []  Cough    []  Shortness of breath         [x] Denies all of the above     Cardiovascular:   []  Chest pain    []  Exertional chest pressure/discomfort           [] Palpitations    []  Syncope     [x] Denies all of the above        Past Medical History:   Diagnosis Date    ACL tear     Asthma     very mild    Hyperlipidemia     Hypertension     Obstructive sleep apnea (adult) (pediatric)     Pericarditis     Pseudotumor cerebri 12/21/2011     Family History   Problem Relation Age of Onset    Cancer Father     Diabetes Father     Heart Failure Father     Hypertension Father     Stroke Father     Migraines Father     Other Father         HANK    Coronary Art Dis Brother     Other Sister         HANK     Social History     Social History    Marital status: Single     Spouse name: N/A    Number of children: N/A    Years of education: N/A     Social History Main Topics    Smoking status: Never Smoker    Smokeless tobacco: Never Used    Alcohol use 0.6 oz/week     1 Standard drinks or equivalent per week      Comment: occ/ social    Drug use: No    Sexual activity: Yes     Partners: Male     Other Topics Concern    None     Social History Narrative    None

## 2018-10-05 ENCOUNTER — OFFICE VISIT (OUTPATIENT)
Dept: INTERNAL MEDICINE CLINIC | Age: 48
End: 2018-10-05
Payer: COMMERCIAL

## 2018-10-05 VITALS
SYSTOLIC BLOOD PRESSURE: 120 MMHG | DIASTOLIC BLOOD PRESSURE: 86 MMHG | TEMPERATURE: 97.8 F | HEART RATE: 68 BPM | BODY MASS INDEX: 43.93 KG/M2 | WEIGHT: 248 LBS | OXYGEN SATURATION: 98 %

## 2018-10-05 DIAGNOSIS — J20.9 ACUTE BRONCHITIS, UNSPECIFIED ORGANISM: Primary | ICD-10-CM

## 2018-10-05 DIAGNOSIS — R05.9 COUGH: ICD-10-CM

## 2018-10-05 PROCEDURE — 99213 OFFICE O/P EST LOW 20 MIN: CPT | Performed by: INTERNAL MEDICINE

## 2018-10-05 RX ORDER — PROMETHAZINE HYDROCHLORIDE AND CODEINE PHOSPHATE 6.25; 1 MG/5ML; MG/5ML
5 SYRUP ORAL 4 TIMES DAILY PRN
Qty: 240 ML | Refills: 0 | Status: SHIPPED | OUTPATIENT
Start: 2018-10-05 | End: 2018-10-12

## 2018-10-05 RX ORDER — ESOMEPRAZOLE MAGNESIUM 40 MG/1
40 FOR SUSPENSION ORAL DAILY
Qty: 30 PACKET | Refills: 1 | Status: SHIPPED | OUTPATIENT
Start: 2018-10-05 | End: 2019-02-22

## 2018-10-05 ASSESSMENT — ENCOUNTER SYMPTOMS
WHEEZING: 0
COUGH: 1
HEARTBURN: 0
SORE THROAT: 0
RHINORRHEA: 0
HEMOPTYSIS: 0
SHORTNESS OF BREATH: 0

## 2018-10-05 NOTE — PROGRESS NOTES
MV) MISC Use with albuterol inhaler. 1 each 0     No current facility-administered medications on file prior to visit. Review of Systems:    Review of Systems   Constitutional: Negative for chills, fever and weight loss. HENT: Negative for ear pain, postnasal drip, rhinorrhea and sore throat. Respiratory: Positive for cough. Negative for hemoptysis, shortness of breath and wheezing. Cardiovascular: Positive for chest pain. Gastrointestinal: Negative for heartburn. Musculoskeletal: Negative for myalgias. Skin: Negative for rash. Objective:    Vitals:    10/05/18 1419   BP: 120/86   Pulse: 68   Temp: 97.8 °F (36.6 °C)   TempSrc: Oral   SpO2: 98%   Weight: 248 lb (112.5 kg)     Wt Readings from Last 3 Encounters:   10/05/18 248 lb (112.5 kg)   09/27/18 244 lb (110.7 kg)   05/04/18 247 lb 12.8 oz (112.4 kg)       Body mass index is 43.93 kg/m². Physical Exam   Constitutional: She appears well-developed and well-nourished. She does not have a sickly appearance. HENT:   Head: Atraumatic. Right Ear: Hearing, tympanic membrane, external ear and ear canal normal.   Left Ear: Hearing, tympanic membrane, external ear and ear canal normal.   Nose: Nose normal. No mucosal edema or rhinorrhea. Mouth/Throat: Oropharynx is clear and moist.   Hoarse voice   Eyes: Pupils are equal, round, and reactive to light. No scleral icterus. Neck: Trachea normal. No thyroid mass and no thyromegaly present. Cardiovascular: Normal rate, regular rhythm, S1 normal, S2 normal, normal heart sounds and intact distal pulses. No murmur heard. Pulmonary/Chest: Effort normal and breath sounds normal. No respiratory distress. She has no wheezes. She has no rhonchi. She has no rales. Dry cough   Abdominal: Soft. Bowel sounds are normal. There is no hepatosplenomegaly. There is no tenderness. Musculoskeletal: She exhibits no edema. Lymphadenopathy:     She has no cervical adenopathy.    Neurological: She is

## 2018-11-20 RX ORDER — OLMESARTAN MEDOXOMIL AND HYDROCHLOROTHIAZIDE 40/25 40; 25 MG/1; MG/1
1 TABLET ORAL DAILY
Qty: 90 TABLET | Refills: 1 | Status: SHIPPED | OUTPATIENT
Start: 2018-11-20 | End: 2019-02-22 | Stop reason: SDUPTHER

## 2019-02-22 ENCOUNTER — OFFICE VISIT (OUTPATIENT)
Dept: INTERNAL MEDICINE CLINIC | Age: 49
End: 2019-02-22
Payer: COMMERCIAL

## 2019-02-22 VITALS
HEIGHT: 64 IN | HEART RATE: 87 BPM | DIASTOLIC BLOOD PRESSURE: 72 MMHG | SYSTOLIC BLOOD PRESSURE: 116 MMHG | BODY MASS INDEX: 42 KG/M2 | WEIGHT: 246 LBS | OXYGEN SATURATION: 98 %

## 2019-02-22 DIAGNOSIS — I10 ESSENTIAL HYPERTENSION: Chronic | ICD-10-CM

## 2019-02-22 DIAGNOSIS — E78.5 HYPERLIPIDEMIA, UNSPECIFIED HYPERLIPIDEMIA TYPE: Chronic | ICD-10-CM

## 2019-02-22 DIAGNOSIS — Z00.00 WELL ADULT EXAM: Primary | ICD-10-CM

## 2019-02-22 LAB
A/G RATIO: 1.3 (ref 1.1–2.2)
ALBUMIN SERPL-MCNC: 4.1 G/DL (ref 3.4–5)
ALP BLD-CCNC: 68 U/L (ref 40–129)
ALT SERPL-CCNC: 9 U/L (ref 10–40)
ANION GAP SERPL CALCULATED.3IONS-SCNC: 12 MMOL/L (ref 3–16)
AST SERPL-CCNC: 12 U/L (ref 15–37)
BILIRUB SERPL-MCNC: <0.2 MG/DL (ref 0–1)
BUN BLDV-MCNC: 17 MG/DL (ref 7–20)
CALCIUM SERPL-MCNC: 9.3 MG/DL (ref 8.3–10.6)
CHLORIDE BLD-SCNC: 109 MMOL/L (ref 99–110)
CHOLESTEROL, TOTAL: 295 MG/DL (ref 0–199)
CO2: 19 MMOL/L (ref 21–32)
CREAT SERPL-MCNC: 1 MG/DL (ref 0.6–1.1)
ESTIMATED AVERAGE GLUCOSE: 122.6 MG/DL
GFR AFRICAN AMERICAN: >60
GFR NON-AFRICAN AMERICAN: 59
GLOBULIN: 3.1 G/DL
GLUCOSE BLD-MCNC: 104 MG/DL (ref 70–99)
HBA1C MFR BLD: 5.9 %
HDLC SERPL-MCNC: 40 MG/DL (ref 40–60)
LDL CHOLESTEROL CALCULATED: 226 MG/DL
POTASSIUM SERPL-SCNC: 4.4 MMOL/L (ref 3.5–5.1)
SODIUM BLD-SCNC: 140 MMOL/L (ref 136–145)
TOTAL PROTEIN: 7.2 G/DL (ref 6.4–8.2)
TRIGL SERPL-MCNC: 145 MG/DL (ref 0–150)
VLDLC SERPL CALC-MCNC: 29 MG/DL

## 2019-02-22 PROCEDURE — 99396 PREV VISIT EST AGE 40-64: CPT | Performed by: INTERNAL MEDICINE

## 2019-02-22 RX ORDER — OLMESARTAN MEDOXOMIL AND HYDROCHLOROTHIAZIDE 40/25 40; 25 MG/1; MG/1
1 TABLET ORAL DAILY
Qty: 90 TABLET | Refills: 3 | Status: SHIPPED | OUTPATIENT
Start: 2019-02-22 | End: 2020-05-12 | Stop reason: SDUPTHER

## 2019-02-22 RX ORDER — ROSUVASTATIN CALCIUM 10 MG/1
10 TABLET, FILM COATED ORAL DAILY
Qty: 90 TABLET | Refills: 3 | Status: SHIPPED | OUTPATIENT
Start: 2019-02-22 | End: 2019-09-17 | Stop reason: SDUPTHER

## 2019-02-22 ASSESSMENT — ENCOUNTER SYMPTOMS
COUGH: 0
EYE PAIN: 0
EYE REDNESS: 0
CHOKING: 0

## 2019-02-22 ASSESSMENT — PATIENT HEALTH QUESTIONNAIRE - PHQ9
2. FEELING DOWN, DEPRESSED OR HOPELESS: 0
SUM OF ALL RESPONSES TO PHQ QUESTIONS 1-9: 0
SUM OF ALL RESPONSES TO PHQ QUESTIONS 1-9: 0
1. LITTLE INTEREST OR PLEASURE IN DOING THINGS: 0
SUM OF ALL RESPONSES TO PHQ9 QUESTIONS 1 & 2: 0

## 2019-02-23 ENCOUNTER — PATIENT MESSAGE (OUTPATIENT)
Dept: INTERNAL MEDICINE CLINIC | Age: 49
End: 2019-02-23

## 2019-02-25 ENCOUNTER — PATIENT MESSAGE (OUTPATIENT)
Dept: INTERNAL MEDICINE CLINIC | Age: 49
End: 2019-02-25

## 2019-04-15 ENCOUNTER — OFFICE VISIT (OUTPATIENT)
Dept: NEUROLOGY | Age: 49
End: 2019-04-15
Payer: COMMERCIAL

## 2019-04-15 VITALS
DIASTOLIC BLOOD PRESSURE: 77 MMHG | WEIGHT: 245 LBS | BODY MASS INDEX: 42.05 KG/M2 | SYSTOLIC BLOOD PRESSURE: 115 MMHG | HEART RATE: 85 BPM

## 2019-04-15 DIAGNOSIS — G93.2 PSEUDOTUMOR CEREBRI: Chronic | ICD-10-CM

## 2019-04-15 PROCEDURE — 99213 OFFICE O/P EST LOW 20 MIN: CPT | Performed by: PSYCHIATRY & NEUROLOGY

## 2019-04-15 RX ORDER — ACETAZOLAMIDE 250 MG/1
TABLET ORAL
Qty: 180 TABLET | Refills: 1 | Status: SHIPPED | OUTPATIENT
Start: 2019-04-15 | End: 2019-10-11 | Stop reason: SDUPTHER

## 2019-04-15 NOTE — PROGRESS NOTES
Tish Brito   Neurology followup    Subjective:   CC/HP  History was obtained from the patient. Interval history:  Patient is doing well without any headaches. She has complained of some dry eyes. Patient was seen by her ophthalmologist in December 2018 and he feels her eyes are stable   No other neurological complaints  Details of her history:  Patient has known pseudotumor cerebri.   Patient was seen by her optometrist in July 2017 and was found to have very mild papilledema bilaterally  Lumbar puncture in 2011 head showed an opening pressure of 35 cm of CSF but the diagnosis was made        REVIEW OF SYSTEMS    Constitutional:  []   Chills   []  Fatigue   []  Fevers   []  Malaise   []  Weight loss     [x] Denies all of the above    Respiratory:   []  Cough    []  Shortness of breath         [x] Denies all of the above     Cardiovascular:   []  Chest pain    []  Exertional chest pressure/discomfort           [] Palpitations    []  Syncope     [x] Denies all of the above        Past Medical History:   Diagnosis Date    ACL tear     Asthma     very mild, normal PFTs    Hyperlipidemia     Hypertension     Obstructive sleep apnea (adult) (pediatric)     Pericarditis     Pseudotumor cerebri 12/21/2011     Family History   Problem Relation Age of Onset    Cancer Father     Diabetes Father     Heart Failure Father     Hypertension Father     Stroke Father     Migraines Father     Other Father         HANK    Kidney Disease Father     Coronary Art Dis Brother     Other Sister         HANK    Other Mother         fibromyalgia    Osteoporosis Mother     Diabetes Mother      Social History     Socioeconomic History    Marital status: Single     Spouse name: None    Number of children: None    Years of education: None    Highest education level: None   Occupational History    None   Social Needs    Financial resource strain: None    Food insecurity:     Worry: None     Inability: None    Transportation needs:     Medical: None     Non-medical: None   Tobacco Use    Smoking status: Never Smoker    Smokeless tobacco: Never Used   Substance and Sexual Activity    Alcohol use: Yes     Alcohol/week: 0.6 oz     Types: 1 Standard drinks or equivalent per week     Comment: occ/ social    Drug use: No    Sexual activity: Yes     Partners: Male   Lifestyle    Physical activity:     Days per week: None     Minutes per session: None    Stress: None   Relationships    Social connections:     Talks on phone: None     Gets together: None     Attends Anglican service: None     Active member of club or organization: None     Attends meetings of clubs or organizations: None     Relationship status: None    Intimate partner violence:     Fear of current or ex partner: None     Emotionally abused: None     Physically abused: None     Forced sexual activity: None   Other Topics Concern    None   Social History Narrative    None        Objective:  Exam:  /77   Pulse 85   Wt 245 lb (111.1 kg)   BMI 42.05 kg/m²   This is a well-nourished patient in no acute distress  Patient is awake, alert and oriented x3. Speech is normal.  Pupils are equal round reacting to light. Extraocular movements intact. Face symmetrical. Tongue midline. Motor exam shows normal symmetrical strength. Deep tendon reflexes normal. Plantar reflexes downgoing. Sensory exam normal. Coordination normal. Gait normal. No carotid bruit. No neck stiffness. Impression :  Pseudotumor cerebri, stable    Plan :  Continue acetazolamide 250 mg, one tablet b.i.d. Recommended that patient continue to lose weight        Please note a portion of  this chart was generated using dragon dictation software. Although every effort was made to ensure the accuracy of this automated transcription, some errors in transcription may have occurred.

## 2019-04-15 NOTE — PATIENT INSTRUCTIONS
Please call with any questions or concerns:   [facility]  @ [phone]. LAB RESULTS:  Please obtain any labs or diagnostic tests as discussed today. You should call the office to check the results. Please allow  3 to 7 days for us to get these results. MEDICATION LIST:  Please bring an accurate list of your medications to every visit. APPOINTMENT CONFIRMATION:  We will call you the day before your scheduled appointment to confirm. If we are unable to reach you, you MUST call back by the end of the day to confirm the appointment or we may be forced to cancel.

## 2019-05-22 ENCOUNTER — OFFICE VISIT (OUTPATIENT)
Dept: INTERNAL MEDICINE CLINIC | Age: 49
End: 2019-05-22
Payer: COMMERCIAL

## 2019-05-22 VITALS
OXYGEN SATURATION: 97 % | HEART RATE: 93 BPM | DIASTOLIC BLOOD PRESSURE: 72 MMHG | WEIGHT: 244 LBS | BODY MASS INDEX: 41.88 KG/M2 | SYSTOLIC BLOOD PRESSURE: 112 MMHG

## 2019-05-22 DIAGNOSIS — E78.5 HYPERLIPIDEMIA, UNSPECIFIED HYPERLIPIDEMIA TYPE: Chronic | ICD-10-CM

## 2019-05-22 DIAGNOSIS — I10 ESSENTIAL HYPERTENSION: Primary | Chronic | ICD-10-CM

## 2019-05-22 DIAGNOSIS — E66.01 CLASS 3 SEVERE OBESITY DUE TO EXCESS CALORIES WITH SERIOUS COMORBIDITY AND BODY MASS INDEX (BMI) OF 40.0 TO 44.9 IN ADULT (HCC): ICD-10-CM

## 2019-05-22 PROCEDURE — 99214 OFFICE O/P EST MOD 30 MIN: CPT | Performed by: INTERNAL MEDICINE

## 2019-05-22 NOTE — PROGRESS NOTES
2019     Mariana Almaraz (:  1970) is a 50 y.o. female, here for evaluation of the following medical concerns:    HPI  Hypertension:  Home blood pressure monitoring: No.  She is adherent to a low sodium diet. Patient denies chest pain, shortness of breath, headache, lightheadedness and blurred vision. Antihypertensive medication side effects: no medication side effects noted. Use of agents associated with hypertension: none. Sodium (mmol/L)   Date Value   2019 140    BUN (mg/dL)   Date Value   2019 17    Glucose (mg/dL)   Date Value   2019 104 (H)      Potassium (mmol/L)   Date Value   2019 4.4    CREATININE (mg/dL)   Date Value   2019 1.0         Hyperlipidemia:  No new myalgias or GI upset on rosuvastatin (Crestor). Medication compliance: compliant most of the time. Patient is  following a low fat, low cholesterol diet. She is not exercising regularly. Lab Results   Component Value Date    CHOL 295 (H) 2019    TRIG 145 2019    HDL 40 2019    LDLCALC 226 (H) 2019     Lab Results   Component Value Date    ALT 9 (L) 2019    AST 12 (L) 2019        Obesity: patient is using ozempic to help with weight loss. She is tolerating it well. She has no side effects. She has not lost any weight but has lost inches. Review of Systems   Eyes: Negative for pain and redness. Respiratory: Negative for cough and shortness of breath. Prior to Visit Medications    Medication Sig Taking?  Authorizing Provider   Semaglutide (OZEMPIC) 0.25 or 0.5 MG/DOSE SOPN Inject 0.5 mg into the skin once a week Yes Aung Brandon MD   acetaZOLAMIDE (DIAMOX) 250 MG tablet TAKE 1 TABLET TWICE A DAY Yes Roma Farmer MD   CRESTOR 10 MG tablet Take 1 tablet by mouth daily Yes Aung Brandon MD   olmesartan-hydrochlorothiazide (BENICAR HCT) 40-25 MG per tablet Take 1 tablet by mouth daily Yes Hanh Hernandez Benny aNranjo MD   Multiple Vitamin (MULTI-VITAMIN DAILY PO) Take 1 tablet by mouth daily Yes Historical Provider, MD   cetirizine (ZYRTEC) 10 MG tablet Take 1 tablet by mouth daily Yes Noah Guardado MD        Social History     Tobacco Use    Smoking status: Never Smoker    Smokeless tobacco: Never Used   Substance Use Topics    Alcohol use: Yes     Alcohol/week: 0.6 oz     Types: 1 Standard drinks or equivalent per week     Comment: occ/ social      Vitals:    05/22/19 1546   BP: 112/72   Site: Left Upper Arm   Position: Sitting   Cuff Size: Large Adult   Pulse: 93   SpO2: 97%   Weight: 244 lb (110.7 kg)      Wt Readings from Last 3 Encounters:   05/22/19 244 lb (110.7 kg)   04/15/19 245 lb (111.1 kg)   02/22/19 246 lb (111.6 kg)     BP Readings from Last 3 Encounters:   05/22/19 112/72   04/15/19 115/77   02/22/19 116/72     Body mass index is 41.88 kg/m². Facility age limit for growth percentiles is 20 years. Physical Exam   Constitutional: She appears well-nourished. HENT:   Head: Normocephalic. Right Ear: External ear normal.   Left Ear: External ear normal.   Mouth/Throat: No oropharyngeal exudate. Eyes: Pupils are equal, round, and reactive to light. Conjunctivae are normal. Right eye exhibits no discharge. Left eye exhibits no discharge. Neck: Normal range of motion. No JVD present. No tracheal deviation present. No thyromegaly present. Cardiovascular: Normal rate, regular rhythm and normal heart sounds. Exam reveals no friction rub. No murmur heard. Pulmonary/Chest: Effort normal and breath sounds normal. No stridor. No respiratory distress. She has no wheezes. ASSESSMENT/PLAN:  1. Essential hypertension  stable  - Comprehensive Metabolic Panel; Future  -  Continue benicar-hctz    2. Hyperlipidemia, unspecified hyperlipidemia type  stable  - Lipid Panel; Future  -  Continue crestor    3.  Class 3 severe obesity due to excess calories with serious comorbidity and body mass index (BMI) of 40.0 to 44.9 in adult (HCC)  improving  - Semaglutide (OZEMPIC) 0.25 or 0.5 MG/DOSE SOPN; Inject 0.5 mg into the skin once a week  Dispense: 2 pen; Refill: 5  - Hemoglobin A1C; Future      Return in about 4 months (around 9/22/2019) for hypertension 30 m in. An electronic signature was used to authenticate this note.     --Bravo Gray MD on 5/23/2019 at 6:44 AM

## 2019-05-23 ASSESSMENT — ENCOUNTER SYMPTOMS
COUGH: 0
SHORTNESS OF BREATH: 0
EYE REDNESS: 0
EYE PAIN: 0

## 2019-06-03 DIAGNOSIS — E66.01 CLASS 3 SEVERE OBESITY DUE TO EXCESS CALORIES WITH SERIOUS COMORBIDITY AND BODY MASS INDEX (BMI) OF 40.0 TO 44.9 IN ADULT (HCC): ICD-10-CM

## 2019-06-03 DIAGNOSIS — I10 ESSENTIAL HYPERTENSION: Chronic | ICD-10-CM

## 2019-06-03 DIAGNOSIS — E78.5 HYPERLIPIDEMIA, UNSPECIFIED HYPERLIPIDEMIA TYPE: Chronic | ICD-10-CM

## 2019-06-03 LAB
A/G RATIO: 1.5 (ref 1.1–2.2)
ALBUMIN SERPL-MCNC: 4.3 G/DL (ref 3.4–5)
ALP BLD-CCNC: 75 U/L (ref 40–129)
ALT SERPL-CCNC: 12 U/L (ref 10–40)
ANION GAP SERPL CALCULATED.3IONS-SCNC: 14 MMOL/L (ref 3–16)
AST SERPL-CCNC: 16 U/L (ref 15–37)
BILIRUB SERPL-MCNC: 0.3 MG/DL (ref 0–1)
BUN BLDV-MCNC: 14 MG/DL (ref 7–20)
CALCIUM SERPL-MCNC: 9.2 MG/DL (ref 8.3–10.6)
CHLORIDE BLD-SCNC: 111 MMOL/L (ref 99–110)
CHOLESTEROL, TOTAL: 190 MG/DL (ref 0–199)
CO2: 17 MMOL/L (ref 21–32)
CREAT SERPL-MCNC: 1 MG/DL (ref 0.6–1.1)
GFR AFRICAN AMERICAN: >60
GFR NON-AFRICAN AMERICAN: 59
GLOBULIN: 2.8 G/DL
GLUCOSE BLD-MCNC: 90 MG/DL (ref 70–99)
HDLC SERPL-MCNC: 34 MG/DL (ref 40–60)
LDL CHOLESTEROL CALCULATED: 129 MG/DL
POTASSIUM SERPL-SCNC: 4 MMOL/L (ref 3.5–5.1)
SODIUM BLD-SCNC: 142 MMOL/L (ref 136–145)
TOTAL PROTEIN: 7.1 G/DL (ref 6.4–8.2)
TRIGL SERPL-MCNC: 134 MG/DL (ref 0–150)
VLDLC SERPL CALC-MCNC: 27 MG/DL

## 2019-06-04 LAB
ESTIMATED AVERAGE GLUCOSE: 102.5 MG/DL
HBA1C MFR BLD: 5.2 %

## 2019-06-13 ENCOUNTER — PATIENT MESSAGE (OUTPATIENT)
Dept: INTERNAL MEDICINE CLINIC | Age: 49
End: 2019-06-13

## 2019-08-20 ENCOUNTER — PATIENT MESSAGE (OUTPATIENT)
Dept: INTERNAL MEDICINE CLINIC | Age: 49
End: 2019-08-20

## 2019-09-09 ENCOUNTER — HOSPITAL ENCOUNTER (OUTPATIENT)
Dept: MAMMOGRAPHY | Age: 49
Discharge: HOME | End: 2019-09-13
Payer: COMMERCIAL

## 2019-09-09 DIAGNOSIS — Z12.31 VISIT FOR SCREENING MAMMOGRAM: ICD-10-CM

## 2019-09-09 PROCEDURE — 77063 BREAST TOMOSYNTHESIS BI: CPT

## 2019-09-17 DIAGNOSIS — E78.5 HYPERLIPIDEMIA, UNSPECIFIED HYPERLIPIDEMIA TYPE: Chronic | ICD-10-CM

## 2019-09-18 RX ORDER — ROSUVASTATIN CALCIUM 10 MG/1
10 TABLET, FILM COATED ORAL DAILY
Qty: 90 TABLET | Refills: 3 | Status: SHIPPED | OUTPATIENT
Start: 2019-09-18 | End: 2020-06-30 | Stop reason: SDUPTHER

## 2019-09-20 ENCOUNTER — TELEPHONE (OUTPATIENT)
Dept: INTERNAL MEDICINE CLINIC | Age: 49
End: 2019-09-20

## 2019-10-11 ENCOUNTER — OFFICE VISIT (OUTPATIENT)
Dept: NEUROLOGY | Age: 49
End: 2019-10-11
Payer: COMMERCIAL

## 2019-10-11 VITALS
SYSTOLIC BLOOD PRESSURE: 106 MMHG | WEIGHT: 237 LBS | HEART RATE: 88 BPM | DIASTOLIC BLOOD PRESSURE: 71 MMHG | BODY MASS INDEX: 40.68 KG/M2

## 2019-10-11 DIAGNOSIS — G93.2 PSEUDOTUMOR CEREBRI: Chronic | ICD-10-CM

## 2019-10-11 PROCEDURE — 99213 OFFICE O/P EST LOW 20 MIN: CPT | Performed by: PSYCHIATRY & NEUROLOGY

## 2019-10-11 RX ORDER — ACETAZOLAMIDE 250 MG/1
TABLET ORAL
Qty: 180 TABLET | Refills: 1 | Status: SHIPPED | OUTPATIENT
Start: 2019-10-11 | End: 2020-12-18 | Stop reason: SDUPTHER

## 2019-10-15 ENCOUNTER — OFFICE VISIT (OUTPATIENT)
Dept: INTERNAL MEDICINE CLINIC | Age: 49
End: 2019-10-15
Payer: COMMERCIAL

## 2019-10-15 VITALS
DIASTOLIC BLOOD PRESSURE: 76 MMHG | SYSTOLIC BLOOD PRESSURE: 130 MMHG | BODY MASS INDEX: 41.2 KG/M2 | WEIGHT: 240 LBS | OXYGEN SATURATION: 98 % | HEART RATE: 89 BPM

## 2019-10-15 DIAGNOSIS — I10 ESSENTIAL HYPERTENSION: Primary | Chronic | ICD-10-CM

## 2019-10-15 DIAGNOSIS — E66.01 CLASS 3 SEVERE OBESITY DUE TO EXCESS CALORIES WITH SERIOUS COMORBIDITY AND BODY MASS INDEX (BMI) OF 40.0 TO 44.9 IN ADULT (HCC): ICD-10-CM

## 2019-10-15 PROCEDURE — 99213 OFFICE O/P EST LOW 20 MIN: CPT | Performed by: INTERNAL MEDICINE

## 2019-10-15 ASSESSMENT — ENCOUNTER SYMPTOMS
EYE PAIN: 0
FACIAL SWELLING: 0
EYE REDNESS: 0

## 2019-12-04 ENCOUNTER — NURSE ONLY (OUTPATIENT)
Dept: INTERNAL MEDICINE CLINIC | Age: 49
End: 2019-12-04

## 2019-12-04 DIAGNOSIS — M25.522 LEFT ELBOW PAIN: Primary | ICD-10-CM

## 2019-12-10 ENCOUNTER — TELEPHONE (OUTPATIENT)
Dept: INTERNAL MEDICINE CLINIC | Age: 49
End: 2019-12-10

## 2020-02-05 ENCOUNTER — PATIENT MESSAGE (OUTPATIENT)
Dept: INTERNAL MEDICINE CLINIC | Age: 50
End: 2020-02-05

## 2020-02-05 NOTE — TELEPHONE ENCOUNTER
From: Natanael Diallo  To: Sabrina Camilo MD  Sent: 2/5/2020 7:38 AM EST  Subject: Non-Urgent Medical Question    May I please have more samples of ozempic. I want to start using again.

## 2020-03-19 ENCOUNTER — PATIENT MESSAGE (OUTPATIENT)
Dept: INTERNAL MEDICINE CLINIC | Age: 50
End: 2020-03-19

## 2020-03-19 NOTE — TELEPHONE ENCOUNTER
From: Guillermo Bates  To: Kristy D eLeón MD  Sent: 3/19/2020 3:22 PM EDT  Subject: Visit Follow-Up Question    I have an appt on 23mar as a follow up. ..its not urgent I am going to cancel and reschedule later. Please advise if this is ok.     Thanks   [facility]

## 2020-03-20 ENCOUNTER — PATIENT MESSAGE (OUTPATIENT)
Dept: INTERNAL MEDICINE CLINIC | Age: 50
End: 2020-03-20

## 2020-03-23 ENCOUNTER — PATIENT MESSAGE (OUTPATIENT)
Dept: INTERNAL MEDICINE CLINIC | Age: 50
End: 2020-03-23

## 2020-03-23 NOTE — TELEPHONE ENCOUNTER
From: Natanael Diallo  To: Sabrina Camilo MD  Sent: 3/23/2020 9:06 AM EDT  Subject: Visit Alfredo Doverer birthday to me . .. confirm. ..thanks      ----- Message -----   From:ARLINE Cortes   Sent:3/23/2020 8:46 AM EDT   To:Gertrude Vieyra   Subject:RE: Visit Follow-Up Question    Gali Packer,    I scheduled you for 6/28/20 @ 8:30am. Please confirm appointment time/date. Thanks, ALDAIR      ----- Message -----   From:Gertrude Vieyra   Sent:3/20/2020 5:42 PM EDT   To:Conor Ojeda MD   Subject:Visit Follow-Up Question    Can you reschedule me for late june      ----- Message -----   From:MA Lunenburgfaisal Cortes   Sent:3/19/2020 4:01 PM EDT   To:Gertrude Galindo [facility]   Subject:RE: Visit Follow-Up Question    Gali Packer,    This is fine to cancel however, I do recommend re-scheduling soon. Our schedule is full and will make it difficult to get you back in a timely manner. Thanks, ALDAIR      ----- Message -----   From:Gertrude Vieyra   Sent:3/19/2020 3:22 PM EDT   To:Conor Ojeda MD   Subject:Visit Follow-Up Question    I have an appt on 23mar as a follow up. ..its not urgent I am going to cancel and reschedule later. Please advise if this is ok.     Thanks   Gali Packer

## 2020-03-23 NOTE — TELEPHONE ENCOUNTER
From: Sam Velasquez  To: Jose Manuel Nash MD  Sent: 3/20/2020 5:42 PM EDT  Subject: Visit Follow-Up Question    Can you reschedule me for late june      ----- Message -----   From:ARLINE Corbin   Sent:3/19/2020 4:01 PM EDT   To:Gertrude Turner   Subject:RE: Visit Follow-Up Question    Lana Martin,    This is fine to cancel however, I do recommend re-scheduling soon. Our schedule is full and will make it difficult to get you back in a timely manner. Thanks, [facility]      ----- Message -----   From:Gertrude Turner   Sent:3/19/2020 3:22 PM EDT   To:Conor Dang MD   Subject:Visit Follow-Up Question    I have an appt on 23mar as a follow up. ..its not urgent I am going to cancel and reschedule later. Please advise if this is ok.     Thanks   Lana Martin

## 2020-05-12 RX ORDER — OLMESARTAN MEDOXOMIL AND HYDROCHLOROTHIAZIDE 40/25 40; 25 MG/1; MG/1
1 TABLET ORAL DAILY
Qty: 90 TABLET | Refills: 2 | Status: SHIPPED | OUTPATIENT
Start: 2020-05-12 | End: 2020-08-17 | Stop reason: SDUPTHER

## 2020-05-12 NOTE — TELEPHONE ENCOUNTER
Patient requesting refill for:    olmesartan-hydrochlorothiazide (BENICAR HCT) 40-25 MG per tablet         [facility] [address], [address] [facility] - [address] [place] [address]\" 10/15/19

## 2020-06-29 ENCOUNTER — OFFICE VISIT (OUTPATIENT)
Dept: INTERNAL MEDICINE CLINIC | Age: 50
End: 2020-06-29
Payer: COMMERCIAL

## 2020-06-29 VITALS
DIASTOLIC BLOOD PRESSURE: 74 MMHG | OXYGEN SATURATION: 97 % | WEIGHT: 242 LBS | SYSTOLIC BLOOD PRESSURE: 108 MMHG | BODY MASS INDEX: 41.54 KG/M2 | HEART RATE: 92 BPM | TEMPERATURE: 97.2 F

## 2020-06-29 LAB
A/G RATIO: 1.5 (ref 1.1–2.2)
ALBUMIN SERPL-MCNC: 4.2 G/DL (ref 3.4–5)
ALP BLD-CCNC: 77 U/L (ref 40–129)
ALT SERPL-CCNC: 15 U/L (ref 10–40)
ANION GAP SERPL CALCULATED.3IONS-SCNC: 13 MMOL/L (ref 3–16)
AST SERPL-CCNC: 18 U/L (ref 15–37)
BILIRUB SERPL-MCNC: <0.2 MG/DL (ref 0–1)
BUN BLDV-MCNC: 20 MG/DL (ref 7–20)
CALCIUM SERPL-MCNC: 8.9 MG/DL (ref 8.3–10.6)
CHLORIDE BLD-SCNC: 107 MMOL/L (ref 99–110)
CHOLESTEROL, TOTAL: 275 MG/DL (ref 0–199)
CO2: 21 MMOL/L (ref 21–32)
CREAT SERPL-MCNC: 1.1 MG/DL (ref 0.6–1.1)
GFR AFRICAN AMERICAN: >60
GFR NON-AFRICAN AMERICAN: 53
GLOBULIN: 2.8 G/DL
GLUCOSE BLD-MCNC: 107 MG/DL (ref 70–99)
HDLC SERPL-MCNC: 37 MG/DL (ref 40–60)
LDL CHOLESTEROL CALCULATED: 209 MG/DL
POTASSIUM SERPL-SCNC: 4.1 MMOL/L (ref 3.5–5.1)
SODIUM BLD-SCNC: 141 MMOL/L (ref 136–145)
TOTAL PROTEIN: 7 G/DL (ref 6.4–8.2)
TRIGL SERPL-MCNC: 146 MG/DL (ref 0–150)
VLDLC SERPL CALC-MCNC: 29 MG/DL

## 2020-06-29 PROCEDURE — 99214 OFFICE O/P EST MOD 30 MIN: CPT | Performed by: INTERNAL MEDICINE

## 2020-06-29 RX ORDER — ESTRADIOL 0.05 MG/D
PATCH TRANSDERMAL
COMMUNITY
Start: 2020-06-18 | End: 2021-03-05

## 2020-06-29 ASSESSMENT — PATIENT HEALTH QUESTIONNAIRE - PHQ9
2. FEELING DOWN, DEPRESSED OR HOPELESS: 0
SUM OF ALL RESPONSES TO PHQ QUESTIONS 1-9: 0
SUM OF ALL RESPONSES TO PHQ QUESTIONS 1-9: 0
SUM OF ALL RESPONSES TO PHQ9 QUESTIONS 1 & 2: 0
1. LITTLE INTEREST OR PLEASURE IN DOING THINGS: 0

## 2020-06-29 ASSESSMENT — ENCOUNTER SYMPTOMS
EYE REDNESS: 0
CHOKING: 0
EYE PAIN: 0
COUGH: 0
FACIAL SWELLING: 0

## 2020-06-29 NOTE — PROGRESS NOTES
tablet by mouth daily Yes Kvng Meraz MD   acetaZOLAMIDE (DIAMOX) 250 MG tablet TAKE 1 TABLET TWICE A DAY Yes Bety Barragan MD   CRESTOR 10 MG tablet Take 1 tablet by mouth daily Yes Kvng Meraz MD   cetirizine (ZYRTEC) 10 MG tablet Take 1 tablet by mouth daily Yes Kvng Meraz MD        Social History     Tobacco Use    Smoking status: Never Smoker    Smokeless tobacco: Never Used   Substance Use Topics    Alcohol use: Yes     Alcohol/week: 1.0 standard drinks     Types: 1 Standard drinks or equivalent per week     Comment: occ/ social      Vitals:    06/29/20 0836   BP: 108/74   Site: Right Upper Arm   Position: Sitting   Cuff Size: Large Adult   Pulse: 92   Temp: 97.2 °F (36.2 °C)   TempSrc: Infrared   SpO2: 97%   Weight: 242 lb (109.8 kg)      Wt Readings from Last 3 Encounters:   06/29/20 242 lb (109.8 kg)   10/15/19 240 lb (108.9 kg)   10/11/19 237 lb (107.5 kg)     BP Readings from Last 3 Encounters:   06/29/20 108/74   10/15/19 130/76   10/11/19 106/71     Body mass index is 41.54 kg/m². Facility age limit for growth percentiles is 20 years. Physical Exam  Constitutional:       Appearance: Normal appearance. HENT:      Head: Normocephalic and atraumatic. Right Ear: Tympanic membrane and ear canal normal.      Left Ear: Tympanic membrane and ear canal normal.      Nose: Nose normal. No congestion or rhinorrhea. Mouth/Throat:      Mouth: Mucous membranes are moist.      Pharynx: No oropharyngeal exudate. Eyes:      General:         Right eye: No discharge. Left eye: No discharge. Pupils: Pupils are equal, round, and reactive to light. Neck:      Musculoskeletal: Normal range of motion and neck supple. No neck rigidity or muscular tenderness. Cardiovascular:      Rate and Rhythm: Normal rate and regular rhythm. Pulses: Normal pulses. Heart sounds: No murmur. No friction rub.    Pulmonary:      Effort: Pulmonary effort is normal. No

## 2020-06-30 ENCOUNTER — PATIENT MESSAGE (OUTPATIENT)
Dept: INTERNAL MEDICINE CLINIC | Age: 50
End: 2020-06-30

## 2020-06-30 RX ORDER — ROSUVASTATIN CALCIUM 10 MG/1
10 TABLET, FILM COATED ORAL DAILY
Qty: 90 TABLET | Refills: 3 | Status: SHIPPED | OUTPATIENT
Start: 2020-06-30 | End: 2021-03-05 | Stop reason: SDUPTHER

## 2020-06-30 NOTE — TELEPHONE ENCOUNTER
From: Janet Rizzo  To: Gaylyn Harada, MD  Sent: 6/30/2020 11:18 AM EDT  Subject: Test Results Question    I have a question about LIPID PANEL resulted on 6/29/20 at 9:11 PM.    [id number] [address] I think it's time for a new prescription through [facility] . .. also my blood pressure.      Thanks  Silvina Barakat

## 2020-08-17 RX ORDER — OLMESARTAN MEDOXOMIL AND HYDROCHLOROTHIAZIDE 40/25 40; 25 MG/1; MG/1
1 TABLET ORAL DAILY
Qty: 90 TABLET | Refills: 2 | Status: SHIPPED | OUTPATIENT
Start: 2020-08-17 | End: 2021-03-05 | Stop reason: SDUPTHER

## 2020-10-15 ENCOUNTER — PATIENT MESSAGE (OUTPATIENT)
Dept: INTERNAL MEDICINE CLINIC | Age: 50
End: 2020-10-15

## 2020-10-15 NOTE — TELEPHONE ENCOUNTER
From: Angela Failing  To: Buck Fischer MD  Sent: 10/15/2020 7:32 AM EDT  Subject: Non-Urgent Medical Question    Colonoscopy: I see i am over due. Do I need to schedule something?     Kaia Matthews

## 2020-10-29 ENCOUNTER — VIRTUAL VISIT (OUTPATIENT)
Dept: INTERNAL MEDICINE CLINIC | Age: 50
End: 2020-10-29
Payer: COMMERCIAL

## 2020-10-29 PROCEDURE — 99442 PR PHYS/QHP TELEPHONE EVALUATION 11-20 MIN: CPT | Performed by: INTERNAL MEDICINE

## 2020-10-29 RX ORDER — POLYETHYLENE GLYCOL 1450
POWDER (GRAM) MISCELLANEOUS
Qty: 238 G | Refills: 0 | Status: ON HOLD | OUTPATIENT
Start: 2020-10-29 | End: 2020-12-18 | Stop reason: ALTCHOICE

## 2020-10-29 RX ORDER — BISACODYL 5 MG
TABLET, DELAYED RELEASE (ENTERIC COATED) ORAL
Qty: 4 TABLET | Refills: 0 | Status: ON HOLD | OUTPATIENT
Start: 2020-10-29 | End: 2020-12-18 | Stop reason: ALTCHOICE

## 2020-10-29 ASSESSMENT — PATIENT HEALTH QUESTIONNAIRE - PHQ9
SUM OF ALL RESPONSES TO PHQ QUESTIONS 1-9: 0
1. LITTLE INTEREST OR PLEASURE IN DOING THINGS: 0
SUM OF ALL RESPONSES TO PHQ9 QUESTIONS 1 & 2: 0
SUM OF ALL RESPONSES TO PHQ QUESTIONS 1-9: 0
2. FEELING DOWN, DEPRESSED OR HOPELESS: 0
SUM OF ALL RESPONSES TO PHQ QUESTIONS 1-9: 0

## 2020-10-29 NOTE — TELEPHONE ENCOUNTER
----- Message from Lyndon Phillips MD sent at 10/29/2020 10:00 AM EDT -----  Regarding: Please schedule for colonoscopy with me  Please call patient and schedule colonoscopy with me per her PCP, Dr. Mariluz Vela. Thanks.      Juan J Ferrara

## 2020-10-29 NOTE — PROGRESS NOTES
Laz Zarco is a 48 y.o. female evaluated via telephone on 10/29/2020. Consent:  She and/or health care decision maker is aware that that she may receive a bill for this telephone service, depending on her insurance coverage, and has provided verbal consent to proceed: Yes      Documentation:  I communicated with the patient and/or health care decision maker about Hypertension  Details of this discussion including any medical advice provided:patient calls for blood pressure. She states that blood pressure has been good. She denies any chest pain or headaches. She  Does not have any side effects from the medication. She would like to continue her blood pressure  Medications. I affirm this is a Patient Initiated Episode with a Patient who has not had a related appointment within my department in the past 7 days or scheduled within the next 24 hours.     Patient identification was verified at the start of the visit: Yes    Total Time: minutes: 11-20 minutes    Note: not billable if this call serves to triage the patient into an appointment for the relevant concern      Robert Pastrana

## 2020-12-08 ENCOUNTER — TELEPHONE (OUTPATIENT)
Dept: GASTROENTEROLOGY | Age: 50
End: 2020-12-08

## 2020-12-08 NOTE — TELEPHONE ENCOUNTER
R/s to [facility] 12/18 7:30 AM Randal olivera test at work and will fax us result

## 2020-12-08 NOTE — TELEPHONE ENCOUNTER
LVM need to R/S colon at [facility] on 12/21  To kraig next week.

## 2020-12-14 NOTE — PROGRESS NOTES
Preoperative Screening for Elective Surgery/Invasive Procedures While COVID-19 present in the community    ? Have you tested positive or have been told to self-isolate for COVID-19 like symptoms within the past 28 days? No  ? Do you currently have any of the following symptoms? No  o Fever >100.0 F or 99.9 F in immunocompromised patients? No  o New onset cough, shortness of breath or difficulty breathing? No  o New onset sore throat, myalgia (muscle aches and pains), headache, loss of taste/smell or diarrhea? No  ? Have you had a potential exposure to COVID-19 within the past 14 days by:  o Close contact with a confirmed case? No  o Close contact with a healthcare worker,  or essential infrastructure worker (grocery store, [employer], gas station, public utilities or transportation)? No  o Do you reside in a congregate setting such as; skilled nursing facility, adult home, correctional facility, homeless shelter or other institutional setting? No  o Have you had recent travel to a known COVID-19 hotspot? No    Indicate if the patient has a positive screen by answering yes to one or more of the above questions. Patients who test positive or screen positive prior to surgery or on the day of surgery should be evaluated in conjunction with the surgeon/proceduralist/anesthesiologist to determine the urgency of the procedure.

## 2020-12-17 ENCOUNTER — ANESTHESIA EVENT (OUTPATIENT)
Dept: ENDOSCOPY | Age: 50
End: 2020-12-17
Payer: COMMERCIAL

## 2020-12-18 ENCOUNTER — HOSPITAL ENCOUNTER (OUTPATIENT)
Age: 50
Setting detail: OUTPATIENT SURGERY
Discharge: HOME | End: 2020-12-18
Attending: INTERNAL MEDICINE | Admitting: INTERNAL MEDICINE
Payer: COMMERCIAL

## 2020-12-18 ENCOUNTER — PATIENT MESSAGE (OUTPATIENT)
Dept: NEUROLOGY | Age: 50
End: 2020-12-18

## 2020-12-18 ENCOUNTER — ANESTHESIA (OUTPATIENT)
Dept: ENDOSCOPY | Age: 50
End: 2020-12-18
Payer: COMMERCIAL

## 2020-12-18 VITALS
HEIGHT: 64 IN | OXYGEN SATURATION: 100 % | DIASTOLIC BLOOD PRESSURE: 102 MMHG | BODY MASS INDEX: 39.27 KG/M2 | RESPIRATION RATE: 16 BRPM | WEIGHT: 230 LBS | HEART RATE: 79 BPM | TEMPERATURE: 97 F | SYSTOLIC BLOOD PRESSURE: 155 MMHG

## 2020-12-18 VITALS
OXYGEN SATURATION: 99 % | RESPIRATION RATE: 20 BRPM | DIASTOLIC BLOOD PRESSURE: 95 MMHG | SYSTOLIC BLOOD PRESSURE: 162 MMHG

## 2020-12-18 PROCEDURE — 2500000003 HC RX 250 WO HCPCS: Performed by: NURSE ANESTHETIST, CERTIFIED REGISTERED

## 2020-12-18 PROCEDURE — 7100000010 HC PHASE II RECOVERY - FIRST 15 MIN: Performed by: INTERNAL MEDICINE

## 2020-12-18 PROCEDURE — 3700000001 HC ADD 15 MINUTES (ANESTHESIA): Performed by: INTERNAL MEDICINE

## 2020-12-18 PROCEDURE — 88305 TISSUE EXAM BY PATHOLOGIST: CPT

## 2020-12-18 PROCEDURE — 2580000003 HC RX 258: Performed by: ANESTHESIOLOGY

## 2020-12-18 PROCEDURE — 2580000003 HC RX 258: Performed by: INTERNAL MEDICINE

## 2020-12-18 PROCEDURE — 6360000002 HC RX W HCPCS: Performed by: NURSE ANESTHETIST, CERTIFIED REGISTERED

## 2020-12-18 PROCEDURE — 7100000011 HC PHASE II RECOVERY - ADDTL 15 MIN: Performed by: INTERNAL MEDICINE

## 2020-12-18 PROCEDURE — 3700000000 HC ANESTHESIA ATTENDED CARE: Performed by: INTERNAL MEDICINE

## 2020-12-18 PROCEDURE — 2709999900 HC NON-CHARGEABLE SUPPLY: Performed by: INTERNAL MEDICINE

## 2020-12-18 PROCEDURE — 3609010600 HC COLONOSCOPY POLYPECTOMY SNARE/COLD BIOPSY: Performed by: INTERNAL MEDICINE

## 2020-12-18 PROCEDURE — 2500000003 HC RX 250 WO HCPCS: Performed by: ANESTHESIOLOGY

## 2020-12-18 PROCEDURE — 45385 COLONOSCOPY W/LESION REMOVAL: CPT | Performed by: INTERNAL MEDICINE

## 2020-12-18 RX ORDER — DIPHENHYDRAMINE HYDROCHLORIDE 50 MG/ML
12.5 INJECTION INTRAMUSCULAR; INTRAVENOUS
Status: DISCONTINUED | OUTPATIENT
Start: 2020-12-18 | End: 2020-12-18 | Stop reason: HOSPADM

## 2020-12-18 RX ORDER — ONDANSETRON 2 MG/ML
4 INJECTION INTRAMUSCULAR; INTRAVENOUS
Status: DISCONTINUED | OUTPATIENT
Start: 2020-12-18 | End: 2020-12-18 | Stop reason: HOSPADM

## 2020-12-18 RX ORDER — SODIUM CHLORIDE, SODIUM LACTATE, POTASSIUM CHLORIDE, CALCIUM CHLORIDE 600; 310; 30; 20 MG/100ML; MG/100ML; MG/100ML; MG/100ML
INJECTION, SOLUTION INTRAVENOUS CONTINUOUS
Status: DISCONTINUED | OUTPATIENT
Start: 2020-12-18 | End: 2020-12-18 | Stop reason: HOSPADM

## 2020-12-18 RX ORDER — ACETAZOLAMIDE 250 MG/1
TABLET ORAL
Qty: 180 TABLET | Refills: 0 | Status: SHIPPED | OUTPATIENT
Start: 2020-12-18 | End: 2021-03-18

## 2020-12-18 RX ORDER — LIDOCAINE HYDROCHLORIDE 20 MG/ML
INJECTION, SOLUTION INFILTRATION; PERINEURAL PRN
Status: DISCONTINUED | OUTPATIENT
Start: 2020-12-18 | End: 2020-12-18 | Stop reason: SDUPTHER

## 2020-12-18 RX ORDER — MORPHINE SULFATE 2 MG/ML
1 INJECTION, SOLUTION INTRAMUSCULAR; INTRAVENOUS EVERY 5 MIN PRN
Status: DISCONTINUED | OUTPATIENT
Start: 2020-12-18 | End: 2020-12-18 | Stop reason: HOSPADM

## 2020-12-18 RX ORDER — MORPHINE SULFATE 2 MG/ML
2 INJECTION, SOLUTION INTRAMUSCULAR; INTRAVENOUS EVERY 5 MIN PRN
Status: DISCONTINUED | OUTPATIENT
Start: 2020-12-18 | End: 2020-12-18 | Stop reason: HOSPADM

## 2020-12-18 RX ORDER — LABETALOL HYDROCHLORIDE 5 MG/ML
5 INJECTION, SOLUTION INTRAVENOUS EVERY 10 MIN PRN
Status: DISCONTINUED | OUTPATIENT
Start: 2020-12-18 | End: 2020-12-18 | Stop reason: HOSPADM

## 2020-12-18 RX ORDER — OXYCODONE HYDROCHLORIDE AND ACETAMINOPHEN 5; 325 MG/1; MG/1
2 TABLET ORAL PRN
Status: DISCONTINUED | OUTPATIENT
Start: 2020-12-18 | End: 2020-12-18 | Stop reason: HOSPADM

## 2020-12-18 RX ORDER — MEPERIDINE HYDROCHLORIDE 50 MG/ML
12.5 INJECTION INTRAMUSCULAR; INTRAVENOUS; SUBCUTANEOUS EVERY 5 MIN PRN
Status: DISCONTINUED | OUTPATIENT
Start: 2020-12-18 | End: 2020-12-18 | Stop reason: HOSPADM

## 2020-12-18 RX ORDER — SODIUM CHLORIDE 0.9 % (FLUSH) 0.9 %
10 SYRINGE (ML) INJECTION PRN
Status: DISCONTINUED | OUTPATIENT
Start: 2020-12-18 | End: 2020-12-18 | Stop reason: HOSPADM

## 2020-12-18 RX ORDER — SODIUM CHLORIDE 0.9 % (FLUSH) 0.9 %
10 SYRINGE (ML) INJECTION EVERY 12 HOURS SCHEDULED
Status: DISCONTINUED | OUTPATIENT
Start: 2020-12-18 | End: 2020-12-18 | Stop reason: HOSPADM

## 2020-12-18 RX ORDER — OXYCODONE HYDROCHLORIDE AND ACETAMINOPHEN 5; 325 MG/1; MG/1
1 TABLET ORAL PRN
Status: DISCONTINUED | OUTPATIENT
Start: 2020-12-18 | End: 2020-12-18 | Stop reason: HOSPADM

## 2020-12-18 RX ORDER — HYDRALAZINE HYDROCHLORIDE 20 MG/ML
5 INJECTION INTRAMUSCULAR; INTRAVENOUS EVERY 10 MIN PRN
Status: DISCONTINUED | OUTPATIENT
Start: 2020-12-18 | End: 2020-12-18 | Stop reason: HOSPADM

## 2020-12-18 RX ORDER — PROMETHAZINE HYDROCHLORIDE 25 MG/ML
6.25 INJECTION, SOLUTION INTRAMUSCULAR; INTRAVENOUS
Status: DISCONTINUED | OUTPATIENT
Start: 2020-12-18 | End: 2020-12-18 | Stop reason: HOSPADM

## 2020-12-18 RX ORDER — PROPOFOL 10 MG/ML
INJECTION, EMULSION INTRAVENOUS PRN
Status: DISCONTINUED | OUTPATIENT
Start: 2020-12-18 | End: 2020-12-18 | Stop reason: SDUPTHER

## 2020-12-18 RX ADMIN — SODIUM CHLORIDE, POTASSIUM CHLORIDE, SODIUM LACTATE AND CALCIUM CHLORIDE: 600; 310; 30; 20 INJECTION, SOLUTION INTRAVENOUS at 06:55

## 2020-12-18 RX ADMIN — LIDOCAINE HYDROCHLORIDE 3 ML: 20 INJECTION, SOLUTION INFILTRATION; PERINEURAL at 07:31

## 2020-12-18 RX ADMIN — PROPOFOL 330 MG: 10 INJECTION, EMULSION INTRAVENOUS at 07:31

## 2020-12-18 RX ADMIN — FAMOTIDINE 20 MG: 10 INJECTION INTRAVENOUS at 06:56

## 2020-12-18 RX ADMIN — SODIUM CHLORIDE, SODIUM LACTATE, POTASSIUM CHLORIDE, AND CALCIUM CHLORIDE: .6; .31; .03; .02 INJECTION, SOLUTION INTRAVENOUS at 07:18

## 2020-12-18 ASSESSMENT — PAIN SCALES - GENERAL
PAINLEVEL_OUTOF10: 0

## 2020-12-18 ASSESSMENT — PAIN - FUNCTIONAL ASSESSMENT: PAIN_FUNCTIONAL_ASSESSMENT: 0-10

## 2020-12-18 NOTE — TELEPHONE ENCOUNTER
From: Christos Oilvia  To: Patricia Blount MD  Sent: 12/18/2020 10:05 AM EST  Subject: Prescription Question    Need refill there is no prescription on file with express script.     Thanks   [facility]

## 2020-12-18 NOTE — OP NOTE
[address] [address]     [facility] ,  [address] [facility], [facility]  Phone: [phone] [facility] [address],  [address] [facility], [facility] Allie Fink  Phone: [phone]   [phone]    Colonoscopy Procedure Note    Patient: Soham Garcia  : 1970    Procedure: Colonoscopy with polypectomy (cold snare)    Date:  2020     Endoscopist:  Chuckie Victoria MD    Referring Physician:  Derian Miranda MD    Preoperative Diagnosis:  Colon cancer screening [Z12.11]    Postoperative Diagnosis:  See impression    Anesthesia: Anesthesia: MAC  Sedation: per anesthesia  Start Time: 2942  Stop Time: 4053  ASA Class: 3  Mallampati: II (soft palate, uvula, fauces visible)    Indications: This is a 48y.o. year old female who presents today with screening for colon cancer.     Procedure Details Informed consent was obtained for the procedure, including sedation. Risks of perforation, hemorrhage, adverse drug reaction and aspiration were discussed. The patient was placed in the left lateral decubitus position. Based on the pre-procedure assessment, including review of the patient's medical history, medications, allergies, and review of systems, she had been deemed to be an appropriate candidate for sedation; she was therefore sedated with the medications listed below. The patient was monitored continuously with ECG tracing, pulse oximetry, blood pressure monitoring, and direct observations. rectal examination was performed. There were no external hemorrhoids, fissures or skin tags. The colonoscope was inserted into the rectum and advanced under direct vision to the cecum, which was identified by the ileocecal valve and appendiceal orifice. The right colon was examined twice as this increases polyp detection especially if other right colon polyps, older age, male, or betancourt syndrome. When segments could not be distended with CO2 or air, it was filled/distended with water. The quality of the colonic preparation was excellent. A careful inspection was made as the colonoscope was withdrawn, including a retroflexed view of the rectum; findings and interventions are described below. Appropriate photodocumentation Was Obtained: appendiceal orifice and ileocecal valve. Findings:   Few small sized non bleeding diverticula in the sigmoid colon. A 8 mm sessile polyp in the cecum, removed by cold snare polypectomy and retrieved for pathology. No bleeding noted. A 8 mm sessile polyp in the ascending colon, removed by cold snare polypectomy and retrieved for pathology. No bleeding noted.     Small sized non bleeding internal hemorrhoids.     - PREP: Miralax  - Overall difficulty: mild in degree  - Abdominal pressure: yes - left lower abdomen  - Change in position: no  - Anesthesia issues: no

## 2020-12-18 NOTE — ANESTHESIA PRE PROCEDURE
Department of Anesthesiology  Preprocedure Note       Name:  Dahlia Reza   Age:  48 y.o.  :  1970                                          MRN:  [id number]         Date:  2020      Surgeon: Mai Chaves):  Dorina Herndon MD    Procedure: Procedure(s):  COLONOSCOPY WITH ANESTHESIA    Medications prior to admission:   Prior to Admission medications    Medication Sig Start Date End Date Taking? Authorizing Provider   bisacodyl (BISACODYL) 5 MG EC tablet Take all 4 tablets day before colonoscopy 10/29/20   Dorina Herndon MD   Polyethylene Glycol POWD Use as directed for Colonoscopy prep. 10/29/20   Dorina Herndon MD   olmesartan-hydroCHLOROthiazide [facility] HCT) 40-25 MG per tablet Take 1 tablet by mouth daily 20   Deshawn Everett MD   CRESTOR 10 MG tablet Take 1 tablet by mouth daily 20   Deshawn Everett MD   [facility]) 0.05 MG/24HR Twice a Week  20   Historical Provider, MD   MULTIPLE VITAMIN PO Take by mouth    Historical Provider, MD   Omega-3 Fatty Acids (FISH OIL PO) Take by mouth    Historical Provider, MD   VITAMIN D PO Take by mouth    Historical Provider, MD   UNABLE TO Luba Deocleciano Joy     Historical Provider, MD   acetaZOLAMIDE (DIAMOX) 250 MG tablet TAKE 1 TABLET TWICE A DAY 10/11/19   Eliazar Doty MD   cetirizine (ZYRTEC) 10 MG tablet Take 1 tablet by mouth daily  Patient taking differently: Take 10 mg by mouth daily as needed  3/27/18   Deshawn Everett MD       Current medications:    No current facility-administered medications for this encounter. Current Outpatient Medications   Medication Sig Dispense Refill    bisacodyl (BISACODYL) 5 MG EC tablet Take all 4 tablets day before colonoscopy 4 tablet 0    Polyethylene Glycol POWD Use as directed for Colonoscopy prep.  238 g 0    olmesartan-hydroCHLOROthiazide (BENICAR HCT) 40-25 MG per tablet Take 1 tablet by mouth daily 90 tablet 2  CRESTOR 10 MG tablet Take 1 tablet by mouth daily 90 tablet 3    estradiol (CLIMARA) 0.05 MG/24HR Twice a Week       MULTIPLE VITAMIN PO Take by mouth      Omega-3 Fatty Acids (FISH OIL PO) Take by mouth      VITAMIN D PO Take by mouth      UNABLE TO FIND Black Seed Oil      acetaZOLAMIDE (DIAMOX) 250 MG tablet TAKE 1 TABLET TWICE A  tablet 1    cetirizine (ZYRTEC) 10 MG tablet Take 1 tablet by mouth daily (Patient taking differently: Take 10 mg by mouth daily as needed ) 30 tablet 5       Allergies: Allergies   Allergen Reactions    Lisinopril        Problem List:    Patient Active Problem List   Diagnosis Code    Hypertension I10    Hyperlipidemia E78.5    Morbid obesity ([facility] [address] E66.01    Pseudotumor cerebri G93.2    Benign intracranial hypertension G93.2    Palpitations R00.2    PVC (premature ventricular contraction) I49.3    Mild persistent asthma J45.30    Obstructive sleep apnea syndrome G47.33       Past Medical History:        Diagnosis Date    Achilles rupture     repaired    Asthma     very mild, normal PFTs    Hyperlipidemia     Hypertension     Murmur, heart     Obstructive sleep apnea (adult) (pediatric)     Pericarditis     Prolonged emergence from general anesthesia     Pseudotumor cerebri 12/21/2011       Past Surgical History:        Procedure Laterality Date    ACHILLES TENDON SURGERY      COLONOSCOPY  2008    HYSTERECTOMY  1/2009    OVARY REMOVAL  2019    TONSILLECTOMY  2005    TUBAL LIGATION         Social History:    Social History     Tobacco Use    Smoking status: Never Smoker    Smokeless tobacco: Never Used   Substance Use Topics    Alcohol use:  Yes     Alcohol/week: 1.0 - 2.0 standard drinks     Types: 1 Standard drinks or equivalent per week                                Counseling given: Not Answered      Vital Signs (Current):   Vitals:    12/14/20 1444   Weight: 230 lb (104.3 kg)   Height: 5' 3.25\" (1.607 m) BP Readings from Last 3 Encounters:   06/29/20 108/74   10/15/19 130/76   10/11/19 106/71       NPO Status:                                                                                 BMI:   Wt Readings from Last 3 Encounters:   06/29/20 242 lb (109.8 kg)   10/15/19 240 lb (108.9 kg)   10/11/19 237 lb (107.5 kg)     Body mass index is 40.42 kg/m². CBC:   Lab Results   Component Value Date    WBC 6.2 11/27/2017    RBC 4.02 11/27/2017    HGB 13.1 11/27/2017    HCT 40.6 11/27/2017    .0 11/27/2017    RDW 14.9 11/27/2017     11/27/2017       CMP:   Lab Results   Component Value Date     06/29/2020    K 4.1 06/29/2020     06/29/2020    CO2 21 06/29/2020    BUN 20 06/29/2020    CREATININE 1.1 06/29/2020    GFRAA >60 06/29/2020    GFRAA >60 10/25/2012    AGRATIO 1.5 06/29/2020    LABGLOM 53 06/29/2020    GLUCOSE 107 06/29/2020    PROT 7.0 06/29/2020    PROT 7.4 11/09/2011    CALCIUM 8.9 06/29/2020    BILITOT <0.2 06/29/2020    ALKPHOS 77 06/29/2020    AST 18 06/29/2020    ALT 15 06/29/2020       POC Tests: No results for input(s): POCGLU, POCNA, POCK, POCCL, POCBUN, POCHEMO, POCHCT in the last 72 hours. Coags:   Lab Results   Component Value Date    PROTIME 12.5 12/21/2011    INR 1.14 12/21/2011    APTT 21.8 12/21/2011       HCG (If Applicable): No results found for: PREGTESTUR, PREGSERUM, HCG, HCGQUANT     ABGs: No results found for: PHART, PO2ART, LUK9CZK, BFF7IRM, BEART, W7LWRESM     Type & Screen (If Applicable):  No results found for: LABABO, LABRH    Drug/Infectious Status (If Applicable):  No results found for: HIV, HEPCAB    COVID-19 Screening (If Applicable): No results found for: COVID19      Anesthesia Evaluation   history of anesthetic complications (prolonged emergence):    Airway: Mallampati: III  TM distance: <3 FB   Neck ROM: limited  Mouth opening: > = 3 FB Dental: normal exam         Pulmonary:   (+) sleep apnea:  asthma:

## 2020-12-18 NOTE — H&P
[id number] [facility],  [address] [place] [address] Ave  [place], [address] [place] [address]  Phone: [phone]     Colon cancer screening    HPI     Thank you Juanpablo Thomas MD for asking me to see Ryan Gutierrez in consultation. Ryan Gutierrez is a 48 y.o. female Single [1] Black [2] with medical history of hypertension, hyperlipidemia and morbid obesity (BMI 39) seen independently with referral for colon cancer screening. Patient offers no complaints. Denies abdominal pain, nausea, vomiting, fever, chills, unintentional weight loss, constipation, diarrhea, hematochezia or melenic stools. Reports prior normal colonoscopy over 10 years ago. Family history significant for colonic polyps in the father but no colon cancer. Review of available records reveals:   Wt Readings from Last 50 Encounters:   12/18/20 230 lb (104.3 kg)   06/29/20 242 lb (109.8 kg)   10/15/19 240 lb (108.9 kg)   10/11/19 237 lb (107.5 kg)   05/22/19 244 lb (110.7 kg)   04/15/19 245 lb (111.1 kg)   02/22/19 246 lb (111.6 kg)   10/05/18 248 lb (112.5 kg)   09/27/18 244 lb (110.7 kg)   05/04/18 247 lb 12.8 oz (112.4 kg)   02/16/18 251 lb 9.6 oz (114.1 kg)   02/09/18 248 lb (112.5 kg)   01/22/18 244 lb (110.7 kg)   11/27/17 240 lb (108.9 kg)   09/12/17 248 lb (112.5 kg)   08/18/17 246 lb (111.6 kg)   08/14/17 241 lb (109.3 kg)   06/22/17 250 lb (113.4 kg)   05/23/17 248 lb (112.5 kg)   05/18/17 250 lb (113.4 kg)   03/30/17 249 lb (112.9 kg)   02/27/17 250 lb (113.4 kg)   02/08/17 251 lb (113.9 kg)   01/30/17 249 lb (112.9 kg)   01/18/17 249 lb (112.9 kg)   01/09/17 247 lb (112 kg)   10/13/16 249 lb (112.9 kg)   10/07/16 247 lb (112 kg)   10/06/16 247 lb (112 kg)   07/20/16 251 lb (113.9 kg)   07/01/16 244 lb (110.7 kg)   03/31/16 243 lb (110.2 kg)   01/19/16 245 lb (111.1 kg)   12/10/15 249 lb (112.9 kg)   09/30/15 239 lb (108.4 kg)   07/17/15 243 lb (110.2 kg)   07/10/15 246 lb (111.6 kg) 06/17/15 241 lb (109.3 kg)   02/06/15 245 lb (111.1 kg)   12/26/14 244 lb (110.7 kg)   12/02/14 241 lb (109.3 kg)   07/09/14 238 lb (108 kg)   05/30/14 238 lb (108 kg)   05/19/14 234 lb (106.1 kg)   02/26/14 234 lb (106.1 kg)   11/29/13 236 lb (107 kg)   10/23/13 238 lb (108 kg)   09/04/13 243 lb (110.2 kg)   04/05/13 240 lb (108.9 kg)   01/30/13 232 lb (105.2 kg)       No components found for: HGBA1C  BP Readings from Last 3 Encounters:   12/18/20 113/79   06/29/20 108/74   10/15/19 130/76     Health Maintenance   Topic Date Due    Pneumococcal 0-64 years Vaccine (1 of 1 - PPSV23) 06/28/1976    Shingles Vaccine (1 of 2) 06/28/2020    Colon cancer screen colonoscopy  06/28/2020    Breast cancer screen  09/09/2020    Lipid screen  06/29/2021    Potassium monitoring  06/29/2021    Creatinine monitoring  06/29/2021    Diabetes screen  06/03/2022    DTaP/Tdap/Td vaccine (2 - Td) 02/06/2025    Flu vaccine  Completed    HIV screen  Completed    Hepatitis A vaccine  Aged Out    Hepatitis B vaccine  Aged Out    Hib vaccine  Aged Out    Meningococcal (ACWY) vaccine  Aged Out       No components found for: [employer]     PAST MEDICAL HISTORY     Past Medical History:   Diagnosis Date    Achilles rupture     repaired    Asthma     very mild, normal PFTs    Hyperlipidemia     Hypertension     Murmur, heart     Obstructive sleep apnea (adult) (pediatric)     Pericarditis     Prolonged emergence from general anesthesia     Pseudotumor cerebri 12/21/2011     FAMILY HISTORY     Family History   Problem Relation Age of Onset    Cancer Father     Diabetes Father     Heart Failure Father     Hypertension Father     Stroke Father     Migraines Father     Other Father         HANK    Kidney Disease Father     Coronary Art Dis Brother     Other Sister         HANK    Diabetes Sister     Other Mother         fibromyalgia    Osteoporosis Mother     Diabetes Mother     Breast Cancer Mother SOCIAL HISTORY     Social History     Socioeconomic History    Marital status: Single     Spouse name: Not on file    Number of children: Not on file    Years of education: Not on file    Highest education level: Not on file   Occupational History    Not on file   Social Needs    Financial resource strain: Not on file    Food insecurity     Worry: Not on file     Inability: Not on file    Transportation needs     Medical: Not on file     Non-medical: Not on file   Tobacco Use    Smoking status: Never Smoker    Smokeless tobacco: Never Used   Substance and Sexual Activity    Alcohol use:  Yes     Alcohol/week: 1.0 - 2.0 standard drinks     Types: 1 Standard drinks or equivalent per week    Drug use: No    Sexual activity: Yes     Partners: Male   Lifestyle    Physical activity     Days per week: Not on file     Minutes per session: Not on file    Stress: Not on file   Relationships    Social connections     Talks on phone: Not on file     Gets together: Not on file     Attends Latter-day service: Not on file     Active member of club or organization: Not on file     Attends meetings of clubs or organizations: Not on file     Relationship status: Not on file    Intimate partner violence     Fear of current or ex partner: Not on file     Emotionally abused: Not on file     Physically abused: Not on file     Forced sexual activity: Not on file   Other Topics Concern    Not on file   Social History Narrative    Not on file     SURGICAL HISTORY     Past Surgical History:   Procedure Laterality Date    ACHILLES TENDON SURGERY      COLONOSCOPY  2008    HYSTERECTOMY  1/2009    OVARY REMOVAL  2019    TONSILLECTOMY  2005    TUBAL LIGATION       [address]   (This list may include medications prescribed during this encounter as epic can not insert only the list prior to this encounter.)    ALLERGIES     Allergies   Allergen Reactions    Lisinopril      IMMUNIZATIONS     Immunization History Administered Date(s) Administered    Influenza, Quadv, IM, PF (6 mo and older Fluzone, Flulaval, Fluarix, and 3 yrs and older Afluria) 10/12/2020    Td, unspecified formulation 01/01/2005    Tdap (Boostrix, Adacel) 02/06/2015     REVIEW OF SYSTEMS   See HPI for further details and pertinent postiives. Negative for the following:  Constitutional: Negative for weight change. Negative for appetite change and fatigue. HENT: Negative for nosebleeds, sore throat, mouth sores, and voice change. Respiratory: Negative for cough, choking and chest tightness. Cardiovascular: Negative for chest pain   Gastrointestinal: See HPI  Musculoskeletal: Negative for arthralgias. Skin: Negative for pallor. Neurological: Negative for weakness and light-headedness. Hematological: Negative for adenopathy. Does not bruise/bleed easily. Psychiatric/Behavioral: Negative for suicidal ideas. PHYSICAL EXAM   VITAL SIGNS: /79   Pulse 84   Temp 97 °F (36.1 °C) (Tympanic)   Resp 16   Ht 5' 3.75\" (1.619 m)   Wt 230 lb (104.3 kg)   SpO2 97%   BMI 39.79 kg/m²   Wt Readings from Last 3 Encounters:   12/18/20 230 lb (104.3 kg)   06/29/20 242 lb (109.8 kg)   10/15/19 240 lb (108.9 kg)     Constitutional: Obese female, well developed, Well nourished, No acute distress, Non-toxic appearance. HENT: Normocephalic, Atraumatic, Bilateral external ears normal, Oropharynx moist, No oral exudates, Nose normal.   Eyes: Conjunctiva normal, No discharge. Neck: Normal range of motion, No tenderness, Supple, No stridor. Lymphatic: No cervical, subclavian, or axillary lymphadenopathy. Cardiovascular: Normal heart rate, Normal rhythm, No murmurs, No rubs, No gallops. Thorax & Lungs: Normal breath sounds, No respiratory distress, No wheezing, No chest tenderness. Abdomen: Pannus abdomen, normal bowel sounds, soft, non tender, non distended, s no hernias,    Rectal:  Deferred. Check blood sugars on all diabetic patients     Standing Status:   Standing     Number of Occurrences:   1    Insert peripheral IV     Standing Status:   Standing     Number of Occurrences:   1       ORDERED FUTURE/PENDING TESTS     Lab Frequency Next Occurrence   COVID-19 Ambulatory Once 12/15/2020   Initiate Oxygen Therapy Protocol PRN    Initiate Oxygen Therapy Protocol DAILY (RT)        FOLLOWUP   No follow-ups on file.           Wilma Bath 12/18/20 6:53 AM EST    CC:  Valery Motley MD

## 2020-12-18 NOTE — PROGRESS NOTES
Pre and post operative expectations and routines explained to patient, verbalized understanding. Preoperative Screening for Elective Surgery/Invasive Procedures While COVID-19 present in the community    ? Have you tested positive or have been told to self-isolate for COVID-19 like symptoms within the past 28 days? NO  ? Do you currently have any of the following symptoms? No  o Fever >100.0 F or 99.9 F in immunocompromised patients? No  o New onset cough, shortness of breath or difficulty breathing? No  o New onset sore throat, myalgia (muscle aches and pains), headache, loss of taste/smell or diarrhea? No  ? Have you had a potential exposure to COVID-19 within the past 14 days by:  o Close contact with a confirmed case? No  o Close contact with a healthcare worker,  or essential infrastructure worker (grocery store, [employer], gas station, public utilities or transportation)? No  o Do you reside in a congregate setting such as; skilled nursing facility, adult home, correctional facility, homeless shelter or other institutional setting? No  o Have you had recent travel to a known COVID-19 hotspot? No    Indicate if the patient has a positive screen by answering yes to one or more of the above questions. Patients who test positive or screen positive prior to surgery or on the day of surgery should be evaluated in conjunction with the surgeon/proceduralist/anesthesiologist to determine the urgency of the procedure.

## 2020-12-21 RX ORDER — ROSUVASTATIN CALCIUM 10 MG/1
TABLET, FILM COATED ORAL
Qty: 90 TABLET | Refills: 3 | OUTPATIENT
Start: 2020-12-21

## 2021-01-04 ENCOUNTER — OFFICE VISIT (OUTPATIENT)
Dept: NEUROLOGY | Age: 51
End: 2021-01-04
Payer: COMMERCIAL

## 2021-01-04 VITALS
BODY MASS INDEX: 42.68 KG/M2 | HEIGHT: 64 IN | RESPIRATION RATE: 14 BRPM | WEIGHT: 250 LBS | DIASTOLIC BLOOD PRESSURE: 84 MMHG | SYSTOLIC BLOOD PRESSURE: 130 MMHG | OXYGEN SATURATION: 95 % | HEART RATE: 82 BPM | TEMPERATURE: 96.6 F

## 2021-01-04 DIAGNOSIS — G93.2 PSEUDOTUMOR CEREBRI: Primary | ICD-10-CM

## 2021-01-04 PROCEDURE — 99213 OFFICE O/P EST LOW 20 MIN: CPT | Performed by: PSYCHIATRY & NEUROLOGY

## 2021-01-04 NOTE — PROGRESS NOTES
Sonido Quintanilla   Neurology followup    Subjective:   CC/HP  History was obtained from the patient. Interval history:  Patient is doing well. She has not had any significant headaches. Patient was seen by her ophthalmologist in October 2020 and he feels her eyes are stable   No other neurological complaints  Details of her history:  Patient has known pseudotumor cerebri.   Patient was seen by her optometrist in July 2017 and was found to have very mild papilledema bilaterally  Lumbar puncture in 2011 head showed an opening pressure of 35 cm of CSF but the diagnosis was made        REVIEW OF SYSTEMS    Constitutional:  []   Chills   []  Fatigue   []  Fevers   []  Malaise   []  Weight loss     [x] Denies all of the above    Respiratory:   []  Cough    []  Shortness of breath         [x] Denies all of the above     Cardiovascular:   []  Chest pain    []  Exertional chest pressure/discomfort           [] Palpitations    []  Syncope     [x] Denies all of the above        Past Medical History:   Diagnosis Date    Achilles rupture     repaired    Asthma     very mild, normal PFTs    Hyperlipidemia     Hypertension     Murmur, heart     Obstructive sleep apnea (adult) (pediatric)     Pericarditis     Prolonged emergence from general anesthesia     Pseudotumor cerebri 12/21/2011     Family History   Problem Relation Age of Onset    Cancer Father     Diabetes Father     Heart Failure Father     Hypertension Father     Stroke Father     Migraines Father     Other Father         HANK    Kidney Disease Father     Coronary Art Dis Brother     Other Sister         HANK    Diabetes Sister     Other Mother         fibromyalgia    Osteoporosis Mother     Diabetes Mother     Breast Cancer Mother      Social History     Socioeconomic History    Marital status: Single     Spouse name: Not on file    Number of children: Not on file    Years of education: Not on file    Highest education level: Not on file Occupational History    Not on file   Social Needs    Financial resource strain: Not on file    Food insecurity     Worry: Not on file     Inability: Not on file    Transportation needs     Medical: Not on file     Non-medical: Not on file   Tobacco Use    Smoking status: Never Smoker    Smokeless tobacco: Never Used   Substance and Sexual Activity    Alcohol use: Yes     Alcohol/week: 1.0 - 2.0 standard drinks     Types: 1 Standard drinks or equivalent per week    Drug use: No    Sexual activity: Yes     Partners: Male   Lifestyle    Physical activity     Days per week: Not on file     Minutes per session: Not on file    Stress: Not on file   Relationships    Social connections     Talks on phone: Not on file     Gets together: Not on file     Attends Zoroastrian service: Not on file     Active member of club or organization: Not on file     Attends meetings of clubs or organizations: Not on file     Relationship status: Not on file    Intimate partner violence     Fear of current or ex partner: Not on file     Emotionally abused: Not on file     Physically abused: Not on file     Forced sexual activity: Not on file   Other Topics Concern    Not on file   Social History Narrative    Not on file        Objective:  Exam:  There were no vitals taken for this visit. This is a well-nourished patient in no acute distress  Patient is awake, alert and oriented x3. Speech is normal.  Pupils are equal round reacting to light. Extraocular movements intact. Face symmetrical. Tongue midline. Motor exam shows normal symmetrical strength. Deep tendon reflexes normal. Plantar reflexes downgoing. Sensory exam normal. Coordination normal. Gait normal. No carotid bruit. No neck stiffness. Impression :  Pseudotumor cerebri, doing well    Plan :  Continue acetazolamide 250 mg, one tablet b.i.d.   Recommended that patient continue to lose weight        Please note a portion of  this chart was generated using dragon dictation software. Although every effort was made to ensure the accuracy of this automated transcription, some errors in transcription may have occurred.

## 2021-01-26 ENCOUNTER — OFFICE VISIT (OUTPATIENT)
Dept: INTERNAL MEDICINE CLINIC | Age: 51
End: 2021-01-26
Payer: COMMERCIAL

## 2021-01-26 VITALS
SYSTOLIC BLOOD PRESSURE: 124 MMHG | BODY MASS INDEX: 42.73 KG/M2 | HEART RATE: 86 BPM | OXYGEN SATURATION: 98 % | WEIGHT: 247 LBS | DIASTOLIC BLOOD PRESSURE: 70 MMHG

## 2021-01-26 DIAGNOSIS — B02.9 HERPES ZOSTER WITHOUT COMPLICATION: Primary | ICD-10-CM

## 2021-01-26 PROCEDURE — 99213 OFFICE O/P EST LOW 20 MIN: CPT | Performed by: INTERNAL MEDICINE

## 2021-01-26 RX ORDER — ACETAMINOPHEN AND CODEINE PHOSPHATE 300; 30 MG/1; MG/1
1 TABLET ORAL EVERY 4 HOURS PRN
Qty: 42 TABLET | Refills: 0 | Status: SHIPPED | OUTPATIENT
Start: 2021-01-26 | End: 2021-02-02

## 2021-01-26 RX ORDER — VALACYCLOVIR HYDROCHLORIDE 1 G/1
1000 TABLET, FILM COATED ORAL 3 TIMES DAILY
Qty: 30 TABLET | Refills: 0 | Status: SHIPPED | OUTPATIENT
Start: 2021-01-26 | End: 2021-02-05

## 2021-01-26 SDOH — ECONOMIC STABILITY: TRANSPORTATION INSECURITY
IN THE PAST 12 MONTHS, HAS LACK OF TRANSPORTATION KEPT YOU FROM MEETINGS, WORK, OR FROM GETTING THINGS NEEDED FOR DAILY LIVING?: NO

## 2021-01-26 SDOH — ECONOMIC STABILITY: FOOD INSECURITY: WITHIN THE PAST 12 MONTHS, YOU WORRIED THAT YOUR FOOD WOULD RUN OUT BEFORE YOU GOT MONEY TO BUY MORE.: NEVER TRUE

## 2021-01-26 ASSESSMENT — PATIENT HEALTH QUESTIONNAIRE - PHQ9
SUM OF ALL RESPONSES TO PHQ QUESTIONS 1-9: 0
1. LITTLE INTEREST OR PLEASURE IN DOING THINGS: 0
2. FEELING DOWN, DEPRESSED OR HOPELESS: 0
SUM OF ALL RESPONSES TO PHQ9 QUESTIONS 1 & 2: 0
SUM OF ALL RESPONSES TO PHQ QUESTIONS 1-9: 0

## 2021-01-26 NOTE — PROGRESS NOTES
[address]  Phone: [phone]           Patient Name: Darwin Hadley    YOB: 1970    Today's Date: 1/26/21           Chief Complaint   Patient presents with    Rash     butt crack          Subjective:  Rash started yesterday. The area is sore and itches. No muscle aches. Pain 6/10. Previously had shingles 15-20 years ago. Occurred after an episode of stress. Currently dealing with family stress. Independent historian required? no    Objective:    Vitals:    01/26/21 1430   BP: 124/70   Pulse: 86   SpO2: 98%   Weight: 247 lb (112 kg)     Wt Readings from Last 3 Encounters:   01/26/21 247 lb (112 kg)   01/04/21 250 lb (113.4 kg)   12/18/20 230 lb (104.3 kg)       Body mass index is 42.73 kg/m². Physical Exam     L                                                                                                      R  Gluteal crease    Labs Reviewed: no    Imaging Reviewed: no      Records Reviewed: no    Assessment:    1. Herpes zoster without complication  Start Valtrex  Start T#3 for severe pain  Rec Shingrix after acute illness.   - valACYclovir (VALTREX) 1 g tablet; Take 1 tablet by mouth 3 times daily for 10 days  Dispense: 30 tablet; Refill: 0  - acetaminophen-codeine (TYLENOL/CODEINE #3) 300-30 MG per tablet; Take 1 tablet by mouth every 4 hours as needed for Pain for up to 7 days. Intended supply: 7 days. Dispense: 42 tablet; Refill: 0        Plan/Patient Instructions:    Patient Instructions   Start Valtrex  Take Tylenol #3 for severe  Take plain tylenol for mild pain        Return for as scheduled forWell exam .       [facility]       Documentation was done using voice recognition dragon software. Every effort was made to ensure accuracy; however, inadvertent, unintentional computerized transcription errors may be present.

## 2021-03-05 ENCOUNTER — OFFICE VISIT (OUTPATIENT)
Dept: INTERNAL MEDICINE CLINIC | Age: 51
End: 2021-03-05
Payer: COMMERCIAL

## 2021-03-05 VITALS
BODY MASS INDEX: 42.73 KG/M2 | WEIGHT: 247 LBS | OXYGEN SATURATION: 98 % | HEART RATE: 77 BPM | SYSTOLIC BLOOD PRESSURE: 116 MMHG | DIASTOLIC BLOOD PRESSURE: 72 MMHG | TEMPERATURE: 96.3 F

## 2021-03-05 DIAGNOSIS — Z00.00 WELL ADULT EXAM: Primary | ICD-10-CM

## 2021-03-05 DIAGNOSIS — Z12.31 ENCOUNTER FOR SCREENING MAMMOGRAM FOR MALIGNANT NEOPLASM OF BREAST: ICD-10-CM

## 2021-03-05 DIAGNOSIS — I10 ESSENTIAL HYPERTENSION: Chronic | ICD-10-CM

## 2021-03-05 DIAGNOSIS — E78.5 HYPERLIPIDEMIA, UNSPECIFIED HYPERLIPIDEMIA TYPE: Chronic | ICD-10-CM

## 2021-03-05 LAB
A/G RATIO: 1.3 (ref 1.1–2.2)
ALBUMIN SERPL-MCNC: 4.1 G/DL (ref 3.4–5)
ALP BLD-CCNC: 73 U/L (ref 40–129)
ALT SERPL-CCNC: 15 U/L (ref 10–40)
ANION GAP SERPL CALCULATED.3IONS-SCNC: 11 MMOL/L (ref 3–16)
AST SERPL-CCNC: 16 U/L (ref 15–37)
BILIRUB SERPL-MCNC: <0.2 MG/DL (ref 0–1)
BUN BLDV-MCNC: 17 MG/DL (ref 7–20)
CALCIUM SERPL-MCNC: 9.8 MG/DL (ref 8.3–10.6)
CHLORIDE BLD-SCNC: 107 MMOL/L (ref 99–110)
CHOLESTEROL, TOTAL: 232 MG/DL (ref 0–199)
CO2: 24 MMOL/L (ref 21–32)
CREAT SERPL-MCNC: 1.1 MG/DL (ref 0.6–1.1)
GFR AFRICAN AMERICAN: >60
GFR NON-AFRICAN AMERICAN: 52
GLOBULIN: 3.1 G/DL
GLUCOSE BLD-MCNC: 104 MG/DL (ref 70–99)
HDLC SERPL-MCNC: 38 MG/DL (ref 40–60)
LDL CHOLESTEROL CALCULATED: 164 MG/DL
POTASSIUM SERPL-SCNC: 4.2 MMOL/L (ref 3.5–5.1)
SODIUM BLD-SCNC: 142 MMOL/L (ref 136–145)
TOTAL PROTEIN: 7.2 G/DL (ref 6.4–8.2)
TRIGL SERPL-MCNC: 149 MG/DL (ref 0–150)
VLDLC SERPL CALC-MCNC: 30 MG/DL

## 2021-03-05 PROCEDURE — 99396 PREV VISIT EST AGE 40-64: CPT | Performed by: INTERNAL MEDICINE

## 2021-03-05 RX ORDER — ESTRADIOL 0.1 MG/G
CREAM VAGINAL
COMMUNITY
Start: 2021-01-26 | End: 2021-07-09 | Stop reason: ALTCHOICE

## 2021-03-05 RX ORDER — ROSUVASTATIN CALCIUM 10 MG/1
10 TABLET, FILM COATED ORAL DAILY
Qty: 90 TABLET | Refills: 2 | Status: SHIPPED | OUTPATIENT
Start: 2021-03-05 | End: 2021-03-10 | Stop reason: DRUGHIGH

## 2021-03-05 RX ORDER — OLMESARTAN MEDOXOMIL AND HYDROCHLOROTHIAZIDE 40/25 40; 25 MG/1; MG/1
1 TABLET ORAL DAILY
Qty: 90 TABLET | Refills: 2 | Status: SHIPPED | OUTPATIENT
Start: 2021-03-05 | End: 2022-03-23

## 2021-03-05 ASSESSMENT — ENCOUNTER SYMPTOMS
EYE REDNESS: 0
EYE PAIN: 0
CHOKING: 0
COUGH: 0
FACIAL SWELLING: 0

## 2021-03-05 ASSESSMENT — PATIENT HEALTH QUESTIONNAIRE - PHQ9
SUM OF ALL RESPONSES TO PHQ QUESTIONS 1-9: 0
SUM OF ALL RESPONSES TO PHQ9 QUESTIONS 1 & 2: 0
SUM OF ALL RESPONSES TO PHQ QUESTIONS 1-9: 0

## 2021-03-05 NOTE — PROGRESS NOTES
3/5/2021    Sukhwinder Gupta (:  1970) is a 48 y.o. female, here for a preventive medicine evaluation. Patent had   A shingles outbreak. She took some valacyclovir and noted some improvement. She would like to hold on shingles vaccine until she gets the coronavirus vaccine. Patient Active Problem List   Diagnosis    Hypertension    Hyperlipidemia    Morbid obesity ([address]    Pseudotumor cerebri    Benign intracranial hypertension    Palpitations    PVC (premature ventricular contraction)    Mild persistent asthma    Obstructive sleep apnea syndrome    Adenomatous polyp of cecum    Adenomatous polyp of ascending colon    Diverticulosis of colon without hemorrhage    Internal hemorrhoids without complication     Patient is sitting on her exercise ball. She is walking the track in [place]. She     Patient has been to the dentist within the past year. Patient wears seat belt when driving    Review of Systems   Constitutional: Negative for diaphoresis and fatigue. HENT: Negative for ear pain and facial swelling. Eyes: Negative for pain and redness. Respiratory: Negative for cough and choking. Prior to Visit Medications    Medication Sig Taking?  Authorizing Provider   estradiol (ESTRACE) 0.1 MG/GM vaginal cream INSERT 1 GRAM INTO VAGINA EVERY MONDAY AND THURSDAY Yes Historical Provider, MD   olmesartan-hydroCHLOROthiazide (BENICAR HCT) 40-25 MG per tablet Take 1 tablet by mouth daily Yes Chris Busby MD   CRESTOR 10 MG tablet Take 1 tablet by mouth daily Yes Chris Busby MD   acetaZOLAMIDE (DIAMOX) 250 MG tablet TAKE 1 TABLET TWICE A DAY Yes Maddie Andrade MD   MULTIPLE VITAMIN PO Take by mouth Yes Historical Provider, MD   Omega-3 Fatty Acids (FISH OIL PO) Take by mouth Yes Historical Provider, MD   VITAMIN D PO Take by mouth Yes Historical Provider, MD   UNABLE TO Luba Hamilton  Yes Historical Provider, MD   cetirizine (ZYRTEC) 10 MG tablet Take 1 tablet by mouth daily  Patient taking differently: Take 10 mg by mouth daily as needed  Yes Cecile Alfaro MD        Allergies   Allergen Reactions    Lisinopril        Past Medical History:   Diagnosis Date    Achilles rupture     repaired    Asthma     very mild, normal PFTs    Hyperlipidemia     Hypertension     Murmur, heart     Obstructive sleep apnea (adult) (pediatric)     Pericarditis     Prolonged emergence from general anesthesia     Pseudotumor cerebri 12/21/2011       Past Surgical History:   Procedure Laterality Date    ACHILLES TENDON SURGERY      COLONOSCOPY  2008    COLONOSCOPY N/A 12/18/2020    COLONOSCOPY POLYPECTOMY SNARE/COLD performed by Sarah Vera MD at [address]  1/2009    OVARY REMOVAL  2019    TONSILLECTOMY  2005    TUBAL LIGATION         Social History     Socioeconomic History    Marital status: Single     Spouse name: Not on file    Number of children: Not on file    Years of education: Not on file    Highest education level: Not on file   Occupational History    Not on file   Social Needs    Financial resource strain: Not hard at all   Eric-Peyton insecurity     Worry: Never true     Inability: Never true   [employer] needs     Medical: No     Non-medical: No   Tobacco Use    Smoking status: Never Smoker    Smokeless tobacco: Never Used   Substance and Sexual Activity    Alcohol use:  Yes     Alcohol/week: 1.0 - 2.0 standard drinks     Types: 1 Standard drinks or equivalent per week    Drug use: No    Sexual activity: Yes     Partners: Male   Lifestyle    Physical activity     Days per week: Not on file     Minutes per session: Not on file    Stress: Not on file   Relationships    Social connections     Talks on phone: Not on file     Gets together: Not on file     Attends [employer] service: Not on file     Active member of club or organization: Not on file     Attends meetings of clubs or organizations: Not on file Relationship status: Not on file    Intimate partner violence     Fear of current or ex partner: Not on file     Emotionally abused: Not on file     Physically abused: Not on file     Forced sexual activity: Not on file   Other Topics Concern    Not on file   Social History Narrative    Not on file        Family History   Problem Relation Age of Onset    Cancer Father     Diabetes Father     Heart Failure Father     Hypertension Father     Stroke Father     Migraines Father     Other Father         HANK    Kidney Disease Father     Coronary Art Dis Brother     Other Sister         HANK    Diabetes Sister     Other Mother         fibromyalgia    Osteoporosis Mother     Diabetes Mother     Breast Cancer Mother        ADVANCE DIRECTIVE: N, <no information>    Vitals:    03/05/21 0823   BP: 116/72   Site: Right Upper Arm   Position: Sitting   Cuff Size: Large Adult   Pulse: 77   Temp: 96.3 °F (35.7 °C)   TempSrc: Infrared   SpO2: 98%   Weight: 247 lb (112 kg)     Estimated body mass index is 42.73 kg/m² as calculated from the following:    Height as of 1/4/21: 5' 3.75\" (1.619 m). Weight as of this encounter: 247 lb (112 kg). Physical Exam  Constitutional:       Appearance: Normal appearance. HENT:      Head: Normocephalic and atraumatic. Right Ear: Tympanic membrane and ear canal normal.      Left Ear: Tympanic membrane and ear canal normal.      Nose: Nose normal. No congestion or rhinorrhea. Mouth/Throat:      Mouth: Mucous membranes are moist.      Pharynx: No oropharyngeal exudate or posterior oropharyngeal erythema. Eyes:      General:         Right eye: No discharge. Extraocular Movements: Extraocular movements intact. Pupils: Pupils are equal, round, and reactive to light. Neck:      Musculoskeletal: Normal range of motion and neck supple. No neck rigidity or muscular tenderness. Cardiovascular:      Heart sounds: No murmur. No friction rub.    Pulmonary: Effort: Pulmonary effort is normal. No respiratory distress. Breath sounds: No stridor. No wheezing or rhonchi. Neurological:      Mental Status: She is alert. No flowsheet data found.     Lab Results   Component Value Date    CHOL 275 06/29/2020    CHOL 190 06/03/2019    CHOL 295 02/22/2019    TRIG 146 06/29/2020    TRIG 134 06/03/2019    TRIG 145 02/22/2019    HDL 37 06/29/2020    HDL 34 06/03/2019    HDL 40 02/22/2019    HDL 35 12/20/2011    HDL 38 11/11/2011    HDL 35 02/14/2011    LDLCALC 209 06/29/2020    LDLCALC 129 06/03/2019    LDLCALC 226 02/22/2019    GLUCOSE 107 06/29/2020    LABA1C 5.2 06/03/2019    LABA1C 5.9 02/22/2019    LABA1C 5.6 12/20/2011       The 10-year ASCVD risk score (Courtney Matamoros, et al., 2013) is: 5.4%    Values used to calculate the score:      Age: 48 years      Sex: Female      Is Non- : Yes      Diabetic: No      Tobacco smoker: No      Systolic Blood Pressure: 474 mmHg      Is BP treated: Yes      HDL Cholesterol: 37 mg/dL      Total Cholesterol: 275 mg/dL    Immunization History   Administered Date(s) Administered    Influenza, Quadv, IM, PF (6 mo and older Fluzone, Flulaval, Fluarix, and 3 yrs and older Afluria) 10/12/2020    Td, unspecified formulation 01/01/2005    Tdap (Boostrix, Adacel) 02/06/2015       Health Maintenance   Topic Date Due    Hepatitis C screen  Never done    Pneumococcal 0-64 years Vaccine (1 of 1 - PPSV23) Never done    Shingles Vaccine (1 of 2) Never done    Breast cancer screen  09/09/2020    Lipid screen  06/29/2021    Potassium monitoring  06/29/2021    Creatinine monitoring  06/29/2021    Diabetes screen  06/03/2022    DTaP/Tdap/Td vaccine (2 - Td) 02/06/2025    Colon cancer screen colonoscopy  12/21/2025    Flu vaccine  Completed    HIV screen  Completed    Hepatitis A vaccine  Aged Out    Hepatitis B vaccine  Aged Out    Hib vaccine  Aged Out    Meningococcal (ACWY) vaccine  Aged Out ASSESSMENT/PLAN:  1. Well adult exam  Continue to exercise  Eat more     2. Essential hypertension  -     olmesartan-hydroCHLOROthiazide (BENICAR HCT) 40-25 MG per tablet; Take 1 tablet by mouth daily, Disp-90 tablet, R-2Normal  -     Comprehensive Metabolic Panel  3. Hyperlipidemia, unspecified hyperlipidemia type  -     CRESTOR 10 MG tablet; Take 1 tablet by mouth daily, Disp-90 tablet, R-2, DAWNormal  -     Lipid Panel  4. Encounter for screening mammogram for malignant neoplasm of breast  -     JACKELYN DIGITAL SCREEN W OR WO CAD BILATERAL; Future      Return in about 4 months (around 7/5/2021) for hypertension 30 min. An electronic signature was used to authenticate this note.     --Cecile Alfaro MD on 3/5/2021 at 8:57 AM

## 2021-03-09 ENCOUNTER — PATIENT MESSAGE (OUTPATIENT)
Dept: INTERNAL MEDICINE CLINIC | Age: 51
End: 2021-03-09

## 2021-03-09 DIAGNOSIS — E78.5 HYPERLIPIDEMIA, UNSPECIFIED HYPERLIPIDEMIA TYPE: Chronic | ICD-10-CM

## 2021-03-10 RX ORDER — ROSUVASTATIN CALCIUM 20 MG/1
20 TABLET, COATED ORAL DAILY
Qty: 90 TABLET | Refills: 2 | Status: SHIPPED | OUTPATIENT
Start: 2021-03-10 | End: 2021-08-11 | Stop reason: SDUPTHER

## 2021-03-10 NOTE — TELEPHONE ENCOUNTER
From: Chaitanya Terrell  To: Barbra Velarde MD  Sent: 3/9/2021 9:03 PM EST  Subject: Test Results Question    Yes interested in raising my med.     Also need to register for covid test.

## 2021-03-10 NOTE — TELEPHONE ENCOUNTER
We will reorder the crestor at the higher dose.  Also, please place patient on the covid-19 vaccine list.

## 2021-03-17 ENCOUNTER — NURSE ONLY (OUTPATIENT)
Dept: INTERNAL MEDICINE CLINIC | Age: 51
End: 2021-03-17

## 2021-03-17 ENCOUNTER — TELEPHONE (OUTPATIENT)
Dept: INTERNAL MEDICINE CLINIC | Age: 51
End: 2021-03-17

## 2021-03-17 ENCOUNTER — PATIENT MESSAGE (OUTPATIENT)
Dept: INTERNAL MEDICINE CLINIC | Age: 51
End: 2021-03-17

## 2021-03-17 ENCOUNTER — IMMUNIZATION (OUTPATIENT)
Dept: PRIMARY CARE CLINIC | Age: 51
End: 2021-03-17
Payer: COMMERCIAL

## 2021-03-17 DIAGNOSIS — M54.12 CERVICAL RADICULAR PAIN: Primary | ICD-10-CM

## 2021-03-17 PROCEDURE — 91301 COVID-19, MODERNA VACCINE 100MCG/0.5ML DOSE: CPT | Performed by: FAMILY MEDICINE

## 2021-03-17 PROCEDURE — 0011A COVID-19, MODERNA VACCINE 100MCG/0.5ML DOSE: CPT | Performed by: FAMILY MEDICINE

## 2021-03-17 NOTE — TELEPHONE ENCOUNTER
From: Abdiaziz Amor  To: Mick Browning MD  Sent: 3/17/2021 6:07 AM EDT  Subject: Prescription Question    Hello I think I pinched a nerve in my back on left. Its been hurting since Friday. It sends pain down my left arm. Hard to sleep due to the pain. Will muscle relaxers help?

## 2021-03-18 ENCOUNTER — TELEPHONE (OUTPATIENT)
Dept: INTERNAL MEDICINE CLINIC | Age: 51
End: 2021-03-18

## 2021-03-18 RX ORDER — PREDNISONE 50 MG/1
50 TABLET ORAL DAILY
Qty: 5 TABLET | Refills: 0 | Status: ON HOLD | OUTPATIENT
Start: 2021-03-18 | End: 2021-03-23 | Stop reason: HOSPADM

## 2021-03-18 NOTE — TELEPHONE ENCOUNTER
Patient called to check on status of a medication that was going to be called in for her from her visit with [facility] yesterday.

## 2021-03-21 ENCOUNTER — APPOINTMENT (OUTPATIENT)
Dept: CT IMAGING | Age: 51
End: 2021-03-21
Payer: COMMERCIAL

## 2021-03-21 ENCOUNTER — APPOINTMENT (OUTPATIENT)
Dept: GENERAL RADIOLOGY | Age: 51
End: 2021-03-21
Payer: COMMERCIAL

## 2021-03-21 ENCOUNTER — HOSPITAL ENCOUNTER (OUTPATIENT)
Age: 51
Setting detail: OBSERVATION
Discharge: HOME | End: 2021-03-23
Attending: EMERGENCY MEDICINE | Admitting: INTERNAL MEDICINE
Payer: COMMERCIAL

## 2021-03-21 DIAGNOSIS — R07.2 PRECORDIAL CHEST PAIN: Primary | ICD-10-CM

## 2021-03-21 PROBLEM — R07.9 CHEST PAIN: Status: ACTIVE | Noted: 2021-03-21

## 2021-03-21 LAB
ANION GAP SERPL CALCULATED.3IONS-SCNC: 11 MMOL/L (ref 3–16)
BASOPHILS ABSOLUTE: 0 K/UL (ref 0–0.2)
BASOPHILS RELATIVE PERCENT: 0.2 %
BUN BLDV-MCNC: 22 MG/DL (ref 7–20)
CALCIUM SERPL-MCNC: 9.3 MG/DL (ref 8.3–10.6)
CHLORIDE BLD-SCNC: 107 MMOL/L (ref 99–110)
CO2: 21 MMOL/L (ref 21–32)
CREAT SERPL-MCNC: 1.1 MG/DL (ref 0.6–1.1)
D DIMER: 550 NG/ML DDU (ref 0–229)
EKG ATRIAL RATE: 81 BPM
EKG DIAGNOSIS: NORMAL
EKG P AXIS: 59 DEGREES
EKG P-R INTERVAL: 174 MS
EKG Q-T INTERVAL: 370 MS
EKG QRS DURATION: 74 MS
EKG QTC CALCULATION (BAZETT): 429 MS
EKG R AXIS: 48 DEGREES
EKG T AXIS: 40 DEGREES
EKG VENTRICULAR RATE: 81 BPM
EOSINOPHILS ABSOLUTE: 0 K/UL (ref 0–0.6)
EOSINOPHILS RELATIVE PERCENT: 0.3 %
GFR AFRICAN AMERICAN: >60
GFR NON-AFRICAN AMERICAN: 52
GLUCOSE BLD-MCNC: 93 MG/DL (ref 70–99)
HCT VFR BLD CALC: 42.9 % (ref 36–48)
HEMOGLOBIN: 13.1 G/DL (ref 12–16)
INR BLD: 0.97 (ref 0.86–1.14)
LYMPHOCYTES ABSOLUTE: 4.7 K/UL (ref 1–5.1)
LYMPHOCYTES RELATIVE PERCENT: 41.3 %
MCH RBC QN AUTO: 31.7 PG (ref 26–34)
MCHC RBC AUTO-ENTMCNC: 30.5 G/DL (ref 31–36)
MCV RBC AUTO: 104.1 FL (ref 80–100)
MONOCYTES ABSOLUTE: 1.2 K/UL (ref 0–1.3)
MONOCYTES RELATIVE PERCENT: 10.2 %
NEUTROPHILS ABSOLUTE: 5.5 K/UL (ref 1.7–7.7)
NEUTROPHILS RELATIVE PERCENT: 48 %
PDW BLD-RTO: 15.5 % (ref 12.4–15.4)
PLATELET # BLD: 234 K/UL (ref 135–450)
PMV BLD AUTO: 8.1 FL (ref 5–10.5)
POTASSIUM REFLEX MAGNESIUM: 3.8 MMOL/L (ref 3.5–5.1)
PROTHROMBIN TIME: 11.2 SEC (ref 10–13.2)
RBC # BLD: 4.12 M/UL (ref 4–5.2)
SARS-COV-2, NAAT: NOT DETECTED
SARS-COV-2, PCR: NOT DETECTED
SODIUM BLD-SCNC: 139 MMOL/L (ref 136–145)
TROPONIN: <0.01 NG/ML
WBC # BLD: 11.4 K/UL (ref 4–11)

## 2021-03-21 PROCEDURE — 6370000000 HC RX 637 (ALT 250 FOR IP): Performed by: INTERNAL MEDICINE

## 2021-03-21 PROCEDURE — G0378 HOSPITAL OBSERVATION PER HR: HCPCS

## 2021-03-21 PROCEDURE — 99283 EMERGENCY DEPT VISIT LOW MDM: CPT

## 2021-03-21 PROCEDURE — 6360000002 HC RX W HCPCS: Performed by: INTERNAL MEDICINE

## 2021-03-21 PROCEDURE — 6360000004 HC RX CONTRAST MEDICATION: Performed by: EMERGENCY MEDICINE

## 2021-03-21 PROCEDURE — 93010 ELECTROCARDIOGRAM REPORT: CPT | Performed by: INTERNAL MEDICINE

## 2021-03-21 PROCEDURE — 85610 PROTHROMBIN TIME: CPT

## 2021-03-21 PROCEDURE — 85025 COMPLETE CBC W/AUTO DIFF WBC: CPT

## 2021-03-21 PROCEDURE — 96376 TX/PRO/DX INJ SAME DRUG ADON: CPT

## 2021-03-21 PROCEDURE — 2580000003 HC RX 258: Performed by: INTERNAL MEDICINE

## 2021-03-21 PROCEDURE — 6360000002 HC RX W HCPCS: Performed by: EMERGENCY MEDICINE

## 2021-03-21 PROCEDURE — 71045 X-RAY EXAM CHEST 1 VIEW: CPT

## 2021-03-21 PROCEDURE — 87635 SARS-COV-2 COVID-19 AMP PRB: CPT

## 2021-03-21 PROCEDURE — U0003 INFECTIOUS AGENT DETECTION BY NUCLEIC ACID (DNA OR RNA); SEVERE ACUTE RESPIRATORY SYNDROME CORONAVIRUS 2 (SARS-COV-2) (CORONAVIRUS DISEASE [COVID-19]), AMPLIFIED PROBE TECHNIQUE, MAKING USE OF HIGH THROUGHPUT TECHNOLOGIES AS DESCRIBED BY CMS-2020-01-R: HCPCS

## 2021-03-21 PROCEDURE — 96372 THER/PROPH/DIAG INJ SC/IM: CPT

## 2021-03-21 PROCEDURE — 85379 FIBRIN DEGRADATION QUANT: CPT

## 2021-03-21 PROCEDURE — 84484 ASSAY OF TROPONIN QUANT: CPT

## 2021-03-21 PROCEDURE — 71260 CT THORAX DX C+: CPT

## 2021-03-21 PROCEDURE — 36415 COLL VENOUS BLD VENIPUNCTURE: CPT

## 2021-03-21 PROCEDURE — 6370000000 HC RX 637 (ALT 250 FOR IP): Performed by: EMERGENCY MEDICINE

## 2021-03-21 PROCEDURE — 93005 ELECTROCARDIOGRAM TRACING: CPT | Performed by: EMERGENCY MEDICINE

## 2021-03-21 PROCEDURE — 80048 BASIC METABOLIC PNL TOTAL CA: CPT

## 2021-03-21 PROCEDURE — 96375 TX/PRO/DX INJ NEW DRUG ADDON: CPT

## 2021-03-21 PROCEDURE — 96374 THER/PROPH/DIAG INJ IV PUSH: CPT

## 2021-03-21 RX ORDER — ACETAMINOPHEN 650 MG/1
650 SUPPOSITORY RECTAL EVERY 6 HOURS PRN
Status: DISCONTINUED | OUTPATIENT
Start: 2021-03-21 | End: 2021-03-23 | Stop reason: HOSPADM

## 2021-03-21 RX ORDER — KETOROLAC TROMETHAMINE 30 MG/ML
30 INJECTION, SOLUTION INTRAMUSCULAR; INTRAVENOUS ONCE
Status: COMPLETED | OUTPATIENT
Start: 2021-03-21 | End: 2021-03-21

## 2021-03-21 RX ORDER — PROMETHAZINE HYDROCHLORIDE 25 MG/1
12.5 TABLET ORAL EVERY 6 HOURS PRN
Status: DISCONTINUED | OUTPATIENT
Start: 2021-03-21 | End: 2021-03-23 | Stop reason: HOSPADM

## 2021-03-21 RX ORDER — SODIUM CHLORIDE 0.9 % (FLUSH) 0.9 %
10 SYRINGE (ML) INJECTION PRN
Status: DISCONTINUED | OUTPATIENT
Start: 2021-03-21 | End: 2021-03-23 | Stop reason: HOSPADM

## 2021-03-21 RX ORDER — HYDROCHLOROTHIAZIDE 25 MG/1
25 TABLET ORAL DAILY
Status: DISCONTINUED | OUTPATIENT
Start: 2021-03-21 | End: 2021-03-23 | Stop reason: HOSPADM

## 2021-03-21 RX ORDER — POLYETHYLENE GLYCOL 3350 17 G/17G
17 POWDER, FOR SOLUTION ORAL DAILY PRN
Status: DISCONTINUED | OUTPATIENT
Start: 2021-03-21 | End: 2021-03-23 | Stop reason: HOSPADM

## 2021-03-21 RX ORDER — ONDANSETRON 2 MG/ML
4 INJECTION INTRAMUSCULAR; INTRAVENOUS EVERY 6 HOURS PRN
Status: DISCONTINUED | OUTPATIENT
Start: 2021-03-21 | End: 2021-03-23 | Stop reason: HOSPADM

## 2021-03-21 RX ORDER — LOSARTAN POTASSIUM 100 MG/1
100 TABLET ORAL DAILY
Status: DISCONTINUED | OUTPATIENT
Start: 2021-03-21 | End: 2021-03-23 | Stop reason: HOSPADM

## 2021-03-21 RX ORDER — OLMESARTAN MEDOXOMIL AND HYDROCHLOROTHIAZIDE 40/25 40; 25 MG/1; MG/1
1 TABLET ORAL DAILY
Status: DISCONTINUED | OUTPATIENT
Start: 2021-03-21 | End: 2021-03-21 | Stop reason: ALTCHOICE

## 2021-03-21 RX ORDER — KETOROLAC TROMETHAMINE 15 MG/ML
15 INJECTION, SOLUTION INTRAMUSCULAR; INTRAVENOUS ONCE
Status: COMPLETED | OUTPATIENT
Start: 2021-03-21 | End: 2021-03-21

## 2021-03-21 RX ORDER — MORPHINE SULFATE 4 MG/ML
4 INJECTION, SOLUTION INTRAMUSCULAR; INTRAVENOUS EVERY 4 HOURS PRN
Status: DISCONTINUED | OUTPATIENT
Start: 2021-03-21 | End: 2021-03-23 | Stop reason: HOSPADM

## 2021-03-21 RX ORDER — SODIUM CHLORIDE 0.9 % (FLUSH) 0.9 %
10 SYRINGE (ML) INJECTION EVERY 12 HOURS SCHEDULED
Status: DISCONTINUED | OUTPATIENT
Start: 2021-03-21 | End: 2021-03-23 | Stop reason: HOSPADM

## 2021-03-21 RX ORDER — HYDROMORPHONE HYDROCHLORIDE 1 MG/ML
1 INJECTION, SOLUTION INTRAMUSCULAR; INTRAVENOUS; SUBCUTANEOUS ONCE
Status: COMPLETED | OUTPATIENT
Start: 2021-03-21 | End: 2021-03-21

## 2021-03-21 RX ORDER — ACETAMINOPHEN 325 MG/1
650 TABLET ORAL EVERY 6 HOURS PRN
Status: DISCONTINUED | OUTPATIENT
Start: 2021-03-21 | End: 2021-03-23 | Stop reason: HOSPADM

## 2021-03-21 RX ORDER — ASPIRIN 325 MG
325 TABLET ORAL ONCE
Status: DISCONTINUED | OUTPATIENT
Start: 2021-03-21 | End: 2021-03-23 | Stop reason: HOSPADM

## 2021-03-21 RX ADMIN — MORPHINE SULFATE 4 MG: 4 INJECTION, SOLUTION INTRAMUSCULAR; INTRAVENOUS at 20:10

## 2021-03-21 RX ADMIN — MORPHINE SULFATE 4 MG: 4 INJECTION, SOLUTION INTRAMUSCULAR; INTRAVENOUS at 07:41

## 2021-03-21 RX ADMIN — ENOXAPARIN SODIUM 40 MG: 40 INJECTION, SOLUTION INTRAVENOUS; SUBCUTANEOUS at 20:10

## 2021-03-21 RX ADMIN — HYDROMORPHONE HYDROCHLORIDE 1 MG: 1 INJECTION, SOLUTION INTRAMUSCULAR; INTRAVENOUS; SUBCUTANEOUS at 08:40

## 2021-03-21 RX ADMIN — LOSARTAN POTASSIUM 100 MG: 100 TABLET, FILM COATED ORAL at 14:53

## 2021-03-21 RX ADMIN — NITROGLYCERIN 0.5 INCH: 20 OINTMENT TOPICAL at 07:41

## 2021-03-21 RX ADMIN — IOPAMIDOL 75 ML: 755 INJECTION, SOLUTION INTRAVENOUS at 08:08

## 2021-03-21 RX ADMIN — Medication 10 ML: at 20:11

## 2021-03-21 RX ADMIN — Medication 10 ML: at 14:54

## 2021-03-21 RX ADMIN — NITROGLYCERIN 0.5 INCH: 20 OINTMENT TOPICAL at 11:37

## 2021-03-21 RX ADMIN — NITROGLYCERIN 0.5 INCH: 20 OINTMENT TOPICAL at 17:56

## 2021-03-21 RX ADMIN — MORPHINE SULFATE 4 MG: 4 INJECTION, SOLUTION INTRAMUSCULAR; INTRAVENOUS at 16:11

## 2021-03-21 RX ADMIN — HYDROCHLOROTHIAZIDE 25 MG: 25 TABLET ORAL at 14:53

## 2021-03-21 RX ADMIN — MORPHINE SULFATE 4 MG: 4 INJECTION, SOLUTION INTRAMUSCULAR; INTRAVENOUS at 11:29

## 2021-03-21 RX ADMIN — ENOXAPARIN SODIUM 40 MG: 40 INJECTION, SOLUTION INTRAVENOUS; SUBCUTANEOUS at 14:54

## 2021-03-21 RX ADMIN — KETOROLAC TROMETHAMINE 30 MG: 30 INJECTION, SOLUTION INTRAMUSCULAR at 08:40

## 2021-03-21 RX ADMIN — KETOROLAC TROMETHAMINE 15 MG: 15 INJECTION, SOLUTION INTRAMUSCULAR; INTRAVENOUS at 14:54

## 2021-03-21 RX ADMIN — ACETAMINOPHEN 650 MG: 325 TABLET ORAL at 20:12

## 2021-03-21 ASSESSMENT — PAIN DESCRIPTION - ORIENTATION
ORIENTATION: LEFT

## 2021-03-21 ASSESSMENT — PAIN DESCRIPTION - INTENSITY: RATING_2: 5

## 2021-03-21 ASSESSMENT — PAIN SCALES - GENERAL
PAINLEVEL_OUTOF10: 10
PAINLEVEL_OUTOF10: 7
PAINLEVEL_OUTOF10: 10
PAINLEVEL_OUTOF10: 4
PAINLEVEL_OUTOF10: 6

## 2021-03-21 ASSESSMENT — PAIN DESCRIPTION - FREQUENCY
FREQUENCY: CONTINUOUS

## 2021-03-21 ASSESSMENT — PAIN DESCRIPTION - LOCATION
LOCATION: CHEST;SHOULDER
LOCATION: SHOULDER
LOCATION: SHOULDER;BACK;CHEST

## 2021-03-21 ASSESSMENT — PAIN DESCRIPTION - PROGRESSION
CLINICAL_PROGRESSION: NOT CHANGED
CLINICAL_PROGRESSION_2: NOT CHANGED

## 2021-03-21 ASSESSMENT — PAIN DESCRIPTION - PAIN TYPE
TYPE_2: ACUTE PAIN
TYPE: ACUTE PAIN
TYPE: ACUTE PAIN

## 2021-03-21 ASSESSMENT — PAIN DESCRIPTION - ONSET
ONSET_2: SUDDEN
ONSET: ON-GOING
ONSET: ON-GOING

## 2021-03-21 ASSESSMENT — PAIN DESCRIPTION - DESCRIPTORS
DESCRIPTORS_2: ACHING
DESCRIPTORS: ACHING;CONSTANT
DESCRIPTORS: CONSTANT;ACHING
DESCRIPTORS: CONSTANT;ACHING

## 2021-03-21 ASSESSMENT — PAIN DESCRIPTION - DURATION
DURATION_2: CONTINUOUS
DURATION_2: INTERMITTENT

## 2021-03-21 NOTE — ED NOTES
PT medicated for pain of 10/10. PT moved from chair to bed. Side rails up X2, call light in reach. PT given warm blanket. PT denies any needs at this time.       West Steele RN  03/21/21 8082

## 2021-03-21 NOTE — ED NOTES
Called [facility] to see if I could transport PT and give bedside report. Per charge RN they are only doing ipad report. Report given via ipad, transport request placed.       Clif Barlow, RN  03/21/21 [address] [place] [address], RN  03/21/21 1020

## 2021-03-21 NOTE — H&P
HOSPITALISTS HISTORY AND PHYSICAL    3/21/2021 2:10 PM    Patient Information:  Ashley Muller is a 48 y.o. female [id number]  PCP:  Rashid Downs MD (Tel: [phone] )    Chief complaint:    Chief Complaint   Patient presents with    Chest Pain     PT arrived with c/o CP for the past few days. worse today has shooting pain thru left arm     History of Present Illness:  Sylvia Steen is a 48 y.o. female has been having 1 week of on and off left chest pain radiating toward her back. Patient_PCP and fell to the skeletal patient was started on prednisone was slightly helped. Today patient pain got worse and was social with shortness of breath increased with any exertion. Thus he came to the ED. Patient does have a history of pericarditis pain is different this time than prior episodes. REVIEW OF SYSTEMS:   Constitutional: Negative for fever,chills or night sweats  ENT: Negative for rhinorrhea, epistaxis, hoarseness, sore throat. Respiratory:see above  Cardiovascular: see above  Gastrointestinal: Negative for nausea, vomiting, diarrhea  Genitourinary: Negative for polyuria, dysuria   Hematologic/Lymphatic: Negative for bleeding tendency, easy bruising  Musculoskeletal: Negative for myalgias and arthralgias  Neurologic: Negative for confusion,dysarthria. Skin: Negative for itching,rash  Psychiatric: Negative for depression,anxiety, agitation. Endocrine: Negative for polydipsia,polyuria,heat /cold intolerance. Past Medical History:   has a past medical history of Achilles rupture, Asthma, Hyperlipidemia, Hypertension, Murmur, heart, Obstructive sleep apnea (adult) (pediatric), Pericarditis, Prolonged emergence from general anesthesia, and Pseudotumor cerebri. Past Surgical History:   has a past surgical history that includes Hysterectomy (1/2009); Tonsillectomy (2005);  Colonoscopy (2008); Tubal ligation; Ovary removal (2019); Achilles tendon surgery; and Colonoscopy (N/A, 12/18/2020). Medications:  No current facility-administered medications on file prior to encounter. Current Outpatient Medications on File Prior to Encounter   Medication Sig Dispense Refill    acetaZOLAMIDE (DIAMOX) 250 MG tablet TAKE 1 TABLET TWICE A DAY (KEEP APPOINTMENT FOR FUTURE REFILLS PLEASE) 180 tablet 0    predniSONE (DELTASONE) 50 MG tablet Take 1 tablet by mouth daily for 5 days 5 tablet 0    rosuvastatin (CRESTOR) 20 MG tablet Take 1 tablet by mouth daily 90 tablet 2    estradiol (ESTRACE) 0.1 MG/GM vaginal cream INSERT 1 GRAM INTO VAGINA EVERY MONDAY AND THURSDAY      olmesartan-hydroCHLOROthiazide (BENICAR HCT) 40-25 MG per tablet Take 1 tablet by mouth daily 90 tablet 2    MULTIPLE VITAMIN PO Take by mouth      Omega-3 Fatty Acids (FISH OIL PO) Take by mouth      VITAMIN D PO Take by mouth      UNABLE TO FIND Black Seed Oil      cetirizine (ZYRTEC) 10 MG tablet Take 1 tablet by mouth daily (Patient taking differently: Take 10 mg by mouth daily as needed ) 30 tablet 5       Allergies: Allergies   Allergen Reactions    Lisinopril         Social History:  Patient Lives home   reports that she has never smoked. She has never used smokeless tobacco. She reports current alcohol use of about 1.0 - 2.0 standard drinks of alcohol per week. She reports that she does not use drugs. Family History:  family history includes Breast Cancer in her mother; Cancer in her father; Coronary Art Dis in her brother; Diabetes in her father, mother, and sister; Heart Failure in her father; Hypertension in her father; Kidney Disease in her father; Migraines in her father; Osteoporosis in her mother;  Other in her father, mother, and sister; Stroke in her father. ,     Physical Exam:  BP (!) 143/104   Pulse 80   Temp 96.8 °F (36 °C) (Temporal)   Resp 16   Ht 5' 3\" (1.6 m)   Wt 261 lb 3.9 oz (118.5 kg) SpO2 97%   BMI 46.28 kg/m²     General appearance:  Appears comfortable. AAOx3  HEENT: atraumatic, Pupils equal, muscous membranes moist, no masses appreciated  Cardiovascular: Regular rate and rhythm no murmurs appreciated, left chest tender to palpation  Respiratory: CTAB no wheezing  Gastrointestinal: Abdomen soft, non-tender, BS+  EXT: no edema  Neurology: no gross focal deficts  Psychiatry: Appropriate affect. Not agitated  Skin: Warm, dry, no rashes appreciated    Labs:  CBC:   Lab Results   Component Value Date    WBC 11.4 03/21/2021    RBC 4.12 03/21/2021    HGB 13.1 03/21/2021    HCT 42.9 03/21/2021    .1 03/21/2021    MCH 31.7 03/21/2021    MCHC 30.5 03/21/2021    RDW 15.5 03/21/2021     03/21/2021    MPV 8.1 03/21/2021     BMP:    Lab Results   Component Value Date     03/21/2021    K 3.8 03/21/2021     03/21/2021    CO2 21 03/21/2021    BUN 22 03/21/2021    CREATININE 1.1 03/21/2021    CALCIUM 9.3 03/21/2021    GFRAA >60 03/21/2021    GFRAA >60 10/25/2012    LABGLOM 52 03/21/2021    GLUCOSE 93 03/21/2021     CT CHEST PULMONARY EMBOLISM W CONTRAST   Final Result   No findings to suggest large central pulmonary embolism. Patchy ground-glass opacity at the lower lobes, likely atelectasis in the   absence of clinical signs or symptoms of pneumonitis. Hepatic steatosis. Nonobstructing left nephrolithiasis. XR CHEST PORTABLE   Final Result   No acute cardiopulmonary disease. Recent imaging reviewed    Problem List  Active Problems:    Chest pain  Resolved Problems:    * No resolved hospital problems.  *        Assessment/Plan:   Chest pain: chest tender to palpation likely secondary to msk pain  - trend troponin echo pending  - cards consult pending    DVT prophylaxis lovenox  Code status full code        Admit as inpatient I anticipate hospitalization spanning more than two midnights for investigation and treatment of the above medically necessary diagnoses. Please note that some part of this chart was generated using Dragon dictation software. Although every effort was made to ensure the accuracy of this automated transcription, some errors in transcription may have occurred inadvertently. If you may need any clarification, please do not hesitate to contact me through [facility].        Jovi Zavaleta MD    3/21/2021 2:10 PM

## 2021-03-21 NOTE — DISCHARGE INSTR - COC
Continuity of Care Form    Patient Name: Antionette Cason   :  1970  MRN:  [id number]    Admit date:  3/21/2021  Discharge date:  ***    Code Status Order: Prior   Advance Directives:     Admitting Physician:  No admitting provider for patient encounter.   PCP: Orin Payne MD    Discharging Nurse: [facility]/Room#: ED-0007/07  Discharging Unit Phone Number: ***    Emergency Contact:   Extended Emergency Contact Information  Primary Emergency Contact: [address] Phone: [phone]  Relation: Other  Secondary Emergency Contact: [address] Phone: [phone]  Mobile Phone: [phone]  Relation: Parent    Past Surgical History:  Past Surgical History:   Procedure Laterality Date    ACHILLES TENDON SURGERY      COLONOSCOPY      COLONOSCOPY N/A 2020    COLONOSCOPY POLYPECTOMY SNARE/COLD performed by Devi Madden MD at [address]  2009    OVARY REMOVAL  2019    TONSILLECTOMY  2005    TUBAL LIGATION         Immunization History:   Immunization History   Administered Date(s) Administered    COVID-19, Moderna, PF, 100mcg/0.5mL 2021    Influenza, Quadv, IM, PF (6 mo and older Fluzone, Flulaval, Fluarix, and 3 yrs and older Afluria) 10/12/2020    Td, unspecified formulation 2005    Tdap (Boostrix, Adacel) 2015       Active Problems:  Patient Active Problem List   Diagnosis Code    Hypertension I10    Hyperlipidemia E78.5    Morbid obesity ([facility] [address] E66.01    Pseudotumor cerebri G93.2    Benign intracranial hypertension G93.2    Palpitations R00.2    PVC (premature ventricular contraction) I49.3    Mild persistent asthma J45.30    Obstructive sleep apnea syndrome G47.33    Adenomatous polyp of cecum D12.0    Adenomatous polyp of ascending colon D12.2    Diverticulosis of colon without hemorrhage K57.30    Internal hemorrhoids without complication Z71.1       Isolation/Infection:   Isolation No Isolation        Patient Infection Status     None to display          Nurse Assessment:  Last Vital Signs: BP (!) 150/96   Pulse 77   Temp 97.9 °F (36.6 °C) (Infrared)   Resp 27   SpO2 99%     Last documented pain score (0-10 scale): Pain Level: 10  Last Weight:   Wt Readings from Last 1 Encounters:   21 247 lb (112 kg)     Mental Status:  {IP PT MENTAL STATUS:99199}    IV Access:  { IVORY IV ACCESS:413163222}    Nursing Mobility/ADLs:  Walking   {CHP DME UBHF:569274039}  Transfer  {CHP DME MWYM:248980017}  Bathing  {CHP DME QRTY:218607392}  Dressing  {CHP DME XMKK:835412893}  Toileting  {CHP DME LGIU:045290938}  Feeding  {CHP DME BDPH:897668327}  Med Admin  {P DME NBNA:995347755}  Med Delivery   { IVORY MED Delivery:729688112}    Wound Care Documentation and Therapy:        Elimination:  Continence:   · Bowel: {YES / CQ:05325}  · Bladder: {YES / HO:28842}  Urinary Catheter: {Urinary Catheter:900672690}   Colostomy/Ileostomy/Ileal Conduit: {YES / AF:38592}       Date of Last BM: ***  No intake or output data in the 24 hours ending 21 0758  No intake/output data recorded.     Safety Concerns:     [address] Safety Concerns:850557269}    Impairments/Disabilities:      [address] Impairments/Disabilities:906329894}    Nutrition Therapy:  Current Nutrition Therapy:   [address] Diet List:619832527}    Routes of Feeding: {P DME Other Feedings:278228378}  Liquids: {Slp liquid thickness:94719}  Daily Fluid Restriction: {CHP DME Yes amt example:768234469}  Last Modified Barium Swallow with Video (Video Swallowing Test): {Done Not Done TRTY:853764370}    Treatments at the Time of Hospital Discharge:   Respiratory Treatments: ***  Oxygen Therapy:  {Therapy; copd oxygen:28046}  Ventilator:    {[facility] Vent YOTM:216613312}    Rehab Therapies: {THERAPEUTIC INTERVENTION:5409228258}  Weight Bearing Status/Restrictions: [address] Weight Bearin}  Other Medical Equipment (for information only, NOT a DME order):

## 2021-03-21 NOTE — ED PROVIDER NOTES
Emergency Department Encounter    Patient: Chaitanya Terrell  MRN: [id number]  : 1970  Date of Evaluation: 3/22/2021  ED Provider:  Florence Enamorado    Triage Chief Complaint:   Chest Pain (PT arrived with c/o CP for the past few days. worse today has shooting pain thru left arm)    [place]:  Chaitanya Terrell is a 48 y.o. female that presents ER for evaluation 10 out of 10 anterior left chest pain with ration of the left jaw and left shoulder is a prior history of pericarditis recurrent, of unclear etiology, she has a history of hypertension hyperlipidemia family history for coronary disease over the last 1 week she has had exertional chest pressure lasting a few minutes no diaphoresis afebrile no COVID-19 exposures family history for mother with hypercoagulable disorder. No hemoptysis no travel positive chest pressure heaviness fullness with radiation the jaw shoulder and back.   Pain awaken her from sleep today    ROS - see HPI, below listed is current ROS at time of my eval:  General:  No fevers, no weakness  Eyes:  no discharge  ENT:  No sore throat, no nasal congestion  Cardiovascular:  + chest pain, no palpitations  Respiratory:  No shortness of breath, no cough, no wheezing  Gastrointestinal:  No pain, no nausea, no vomiting, no diarrhea  Musculoskeletal:  No muscle pain, no joint pain  Skin:  No rash, no pruritis  Neurologic:  No speech problems, no headache, no extremity numbness, no extremity tingling, no extremity weakness  Psychiatric:  No anxiety  Genitourinary:  No dysuria, no hematuria  Endocrine:  No unexpected weight gain, no unexpected weight loss  Extremities:  no edema, no pain    Past Medical History:   Diagnosis Date    Achilles rupture     repaired    Asthma     very mild, normal PFTs    Hyperlipidemia     Hypertension     Murmur, heart     Obstructive sleep apnea (adult) (pediatric)     Pericarditis     Prolonged emergence from general anesthesia     Pseudotumor cerebri 2011 of current or ex partner: Not on file     Emotionally abused: Not on file     Physically abused: Not on file     Forced sexual activity: Not on file   Other Topics Concern    Not on file   Social History Narrative    Not on file     Current Facility-Administered Medications   Medication Dose Route Frequency Provider Last Rate Last Admin    aspirin tablet 325 mg  325 mg Oral Once Juani Manzo MD        morphine injection 4 mg  4 mg Intravenous I5G PRN Juani Manzo MD   4 mg at 03/22/21 0202    nitroglycerin (NITRO-BID) 2 % ointment 0.5 inch  0.5 inch Topical 4 times per day Juani Manzo MD   0.5 inch at 03/22/21 0546    sodium chloride flush 0.9 % injection 10 mL  10 mL Intravenous 2 times per day Staci Winslow MD   10 mL at 03/21/21 2011    sodium chloride flush 0.9 % injection 10 mL  10 mL Intravenous PRN Staci Winslow MD   10 mL at 03/21/21 1454    enoxaparin (LOVENOX) injection 40 mg  40 mg Subcutaneous BID Staci Winslow MD   40 mg at 03/21/21 2010    promethazine (PHENERGAN) tablet 12.5 mg  12.5 mg Oral Q6H PRN Staci Winslow MD        Or    ondansetron (ZOFRAN) injection 4 mg  4 mg Intravenous Q6H PRN Staci Winslow MD        polyethylene glycol (GLYCOLAX) packet 17 g  17 g Oral Daily PRN Staci Winslow MD        acetaminophen (TYLENOL) tablet 650 mg  650 mg Oral Q6H PRN Staci Winslow MD   650 mg at 03/22/21 0056    Or    acetaminophen (TYLENOL) suppository 650 mg  650 mg Rectal Q6H PRN Staci Winslow MD        perflutren lipid microspheres (DEFINITY) injection 1.65 mg  1.5 mL Intravenous ONCE PRN Marley Turner MD        losartan (COZAAR) tablet 100 mg  100 mg Oral Daily Staci Winslow MD   100 mg at 03/21/21 1453    hydroCHLOROthiazide (HYDRODIURIL) tablet 25 mg  25 mg Oral Daily Staci Winslow MD   25 mg at 03/21/21 1453     Allergies   Allergen Reactions    Lisinopril        Nursing Notes Reviewed    Physical Exam:  Triage VS:    ED Triage Vitals   Enc Vitals Group      BP 03/21/21 0714 (!) 156/100      Pulse 03/21/21 0719 86      Resp 03/21/21 0719 18      Temp 03/21/21 0719 97.9 °F (36.6 °C)      Temp Source 03/21/21 0719 Infrared      SpO2 03/21/21 0714 99 %      Weight --       Height --       Head Circumference --       Peak Flow --       Pain Score --       Pain Loc --       Pain Edu? --       Excl. in [address]? --        My pulse ox interpretation is  normal    General appearance:  No acute distress. Skin:  Warm. Dry. Eye:  Extraocular movements intact. Ears, nose, mouth and throat:  Oral mucosa moist   Neck:  Trachea midline. Extremity:  No swelling. Normal ROM     Heart:  Regular rate and rhythm, normal S1 & S2, no extra heart sounds. Perfusion:  intact  Respiratory:  Lungs clear to auscultation bilaterally. Respirations nonlabored. Abdominal:  Normal bowel sounds. Soft. Nontender. Non distended. Neurological:  Alert and oriented times 3.              Psychiatric:  Appropriate    I have reviewed and interpreted all of the currently available lab results from this visit (if applicable):  Results for orders placed or performed during the hospital encounter of 03/21/21   SARS-CoV-2 NAAT (Rapid)    Specimen: Nasopharyngeal Swab   Result Value Ref Range    SARS-CoV-2, NAAT Not Detected Not Detected   CBC Auto Differential   Result Value Ref Range    WBC 11.4 (H) 4.0 - 11.0 K/uL    RBC 4.12 4.00 - 5.20 M/uL    Hemoglobin 13.1 12.0 - 16.0 g/dL    Hematocrit 42.9 36.0 - 48.0 %    .1 (H) 80.0 - 100.0 fL    MCH 31.7 26.0 - 34.0 pg    MCHC 30.5 (L) 31.0 - 36.0 g/dL    RDW 15.5 (H) 12.4 - 15.4 %    Platelets 926 426 - 688 K/uL    MPV 8.1 5.0 - 10.5 fL    Neutrophils % 48.0 %    Lymphocytes % 41.3 %    Monocytes % 10.2 %    Eosinophils % 0.3 %    Basophils % 0.2 %    Neutrophils Absolute 5.5 1.7 - 7.7 K/uL    Lymphocytes Absolute 4.7 1.0 - 5.1 K/uL    Monocytes Absolute 1.2 0.0 - 1.3 K/uL    Eosinophils Absolute 0.0 0.0 - 0.6 K/uL    Basophils Absolute 0.0 0.0 - 0.2 K/uL   Basic Metabolic Panel w/ Reflex to MG   Result Value Ref Range    Sodium 139 136 - 145 mmol/L    Potassium reflex Magnesium 3.8 3.5 - 5.1 mmol/L    Chloride 107 99 - 110 mmol/L    CO2 21 21 - 32 mmol/L    Anion Gap 11 3 - 16    Glucose 93 70 - 99 mg/dL    BUN 22 (H) 7 - 20 mg/dL    CREATININE 1.1 0.6 - 1.1 mg/dL    GFR Non-African American 52 (A) >60    GFR African American >60 >60    Calcium 9.3 8.3 - 10.6 mg/dL   Troponin   Result Value Ref Range    Troponin <0.01 <0.01 ng/mL   Protime-INR   Result Value Ref Range    Protime 11.2 10.0 - 13.2 sec    INR 0.97 0.86 - 1.14   D-Dimer, Quantitative   Result Value Ref Range    D-Dimer, Quant 550 (H) 0 - 229 ng/mL DDU   COVID-19   Result Value Ref Range    SARS-CoV-2, PCR Not Detected Not Detected   Troponin   Result Value Ref Range    Troponin <0.01 <0.01 ng/mL   Troponin   Result Value Ref Range    Troponin <0.01 <0.01 ng/mL   Basic Metabolic Panel w/ Reflex to MG   Result Value Ref Range    Sodium 138 136 - 145 mmol/L    Potassium reflex Magnesium 3.6 3.5 - 5.1 mmol/L    Chloride 105 99 - 110 mmol/L    CO2 26 21 - 32 mmol/L    Anion Gap 7 3 - 16    Glucose 94 70 - 99 mg/dL    BUN 24 (H) 7 - 20 mg/dL    CREATININE 1.1 0.6 - 1.1 mg/dL    GFR Non-African American 52 (A) >60    GFR African American >60 >60    Calcium 8.8 8.3 - 10.6 mg/dL   CBC auto differential   Result Value Ref Range    WBC 9.4 4.0 - 11.0 K/uL    RBC 3.76 (L) 4.00 - 5.20 M/uL    Hemoglobin 12.1 12.0 - 16.0 g/dL    Hematocrit 38.2 36.0 - 48.0 %    .8 (H) 80.0 - 100.0 fL    MCH 32.3 26.0 - 34.0 pg    MCHC 31.8 31.0 - 36.0 g/dL    RDW 15.0 12.4 - 15.4 %    Platelets 700 755 - 619 K/uL    MPV 8.3 5.0 - 10.5 fL    Neutrophils % 52.6 %    Lymphocytes % 37.4 %    Monocytes % 9.3 %    Eosinophils % 0.3 %    Basophils % 0.4 %    Neutrophils Absolute 5.0 1.7 - 7.7 K/uL    Lymphocytes Absolute 3.5 1.0 - 5.1 K/uL    Monocytes Absolute 0.9 0.0 - 1.3 K/uL Eosinophils Absolute 0.0 0.0 - 0.6 K/uL    Basophils Absolute 0.0 0.0 - 0.2 K/uL   EKG 12 Lead   Result Value Ref Range    Ventricular Rate 81 BPM    Atrial Rate 81 BPM    P-R Interval 174 ms    QRS Duration 74 ms    Q-T Interval 370 ms    QTc Calculation (Bazett) 429 ms    P Axis 59 degrees    R Axis 48 degrees    T Axis 40 degrees    Diagnosis       Normal sinus rhythmConfirmed by HUMBERTO ABREU MD ([id number]) on 3/21/2021 10:31:46 AM      Radiographs (if obtained):  Radiologist's Report Reviewed:  No results found. EKG (if obtained): (All EKG's are interpreted by myself in the absence of a cardiologist)  Rhythm, normal axis, normal levels, no acute ST segment abnormalities    MDM:  Patient presented with chest pain. Given history and presentation cardiac workup initiated. Patient had labs, EKG, x-ray and troponin ordered. Patient was placed on monitor. Patient's pulse ox is normal.      HEART Score:  Heart Score for chest pain patients  History: Moderately Suspicious  ECG: Non-Specifc repolarization disturbance/LBTB/PM  Patient Age: < 45 years  *Risk factors for Atherosclerotic disease: Hypertension, Hypercholesterolemia, Positive family History  Risk Factors: > 3 Risk factors or history of atherosclerotic disease*  Troponin: < 1X normal limit  Heart Score Total: 4    Patient's chest x-ray is read by radiology as negative for acute abnormality    Since initial troponin was negative her initial EKG is nonspecific she continues to have significant chest pressure and discomfort receiving nitroglycerin and morphine she had aspirin prior to arrival her CT of her chest due to the abnormal D-dimer yields  Patient will be admitted for cardiovascular rule out, due to her multiple risk factors ongoing pain and a heart score of 4.   Patient does have some groundglass opacifications, COVID-19 test is being performed she could have underlying pleuropericarditis, however given her multiple cardiovascular risk factors she was given aspirin nitroglycerin nitroglycerin paste and will be admitted for cardiovascular rule out. Should cardiac enzymes are negative no PE or dissection. Total critical care time provided today was 31 minutes. This excludes seperately billable procedures and family discussion time. Critical care time provided for obtaining history, conducting a physical exam, performing and monitoring interventions, ordering, collecting and interpreting tests, and establishing medical decision-making. There was a potential for life/limb threatening pathology requiring close evaluation and intervention with concern for patient decompensation. Clinical Impression:  1. Precordial chest pain      Disposition referral (if applicable):  No follow-up provider specified. Disposition medications (if applicable):  Current Discharge Medication List          Comment: Please note this report has been produced using speech recognition software and may contain errors related to that system including errors in grammar, punctuation, and spelling, as well as words and phrases that may be inappropriate. Efforts were made to edit the dictations.       Lucretia Ayala MD  52/41/69 [facility] Yue Wang MD  40/87/26 [id number]

## 2021-03-21 NOTE — ACP (ADVANCE CARE PLANNING)
Advanced Care Planning Note.     Purpose of Encounter: Advanced care planning in light of hospitalization  Parties In Attendance: Patient,    Decisional Capacity: Yes  Subjective: Patient  understand that this conversation is to address long term care goal  Objective: cr 1.1  Goals of Care Determination: Patient would pursue cpr intubation and peg tube and dialysis, would want family to decide on trach and long term ventilation  Code Status: full code  Time spent on Advanced care Plannin minutes  Advanced Care Planning Documents: documented patient's wishes, would like mother Aarti Jarvis  to make medical decisions if unable to make decisions    Ángel Lama MD  3/21/2021 2:13 PM

## 2021-03-22 LAB
ANION GAP SERPL CALCULATED.3IONS-SCNC: 7 MMOL/L (ref 3–16)
BASOPHILS ABSOLUTE: 0 K/UL (ref 0–0.2)
BASOPHILS RELATIVE PERCENT: 0.4 %
BUN BLDV-MCNC: 24 MG/DL (ref 7–20)
CALCIUM SERPL-MCNC: 8.8 MG/DL (ref 8.3–10.6)
CHLORIDE BLD-SCNC: 105 MMOL/L (ref 99–110)
CO2: 26 MMOL/L (ref 21–32)
CREAT SERPL-MCNC: 1.1 MG/DL (ref 0.6–1.1)
EOSINOPHILS ABSOLUTE: 0 K/UL (ref 0–0.6)
EOSINOPHILS RELATIVE PERCENT: 0.3 %
GFR AFRICAN AMERICAN: >60
GFR NON-AFRICAN AMERICAN: 52
GLUCOSE BLD-MCNC: 94 MG/DL (ref 70–99)
HCT VFR BLD CALC: 38.2 % (ref 36–48)
HEMOGLOBIN: 12.1 G/DL (ref 12–16)
LYMPHOCYTES ABSOLUTE: 3.5 K/UL (ref 1–5.1)
LYMPHOCYTES RELATIVE PERCENT: 37.4 %
MCH RBC QN AUTO: 32.3 PG (ref 26–34)
MCHC RBC AUTO-ENTMCNC: 31.8 G/DL (ref 31–36)
MCV RBC AUTO: 101.8 FL (ref 80–100)
MONOCYTES ABSOLUTE: 0.9 K/UL (ref 0–1.3)
MONOCYTES RELATIVE PERCENT: 9.3 %
NEUTROPHILS ABSOLUTE: 5 K/UL (ref 1.7–7.7)
NEUTROPHILS RELATIVE PERCENT: 52.6 %
PDW BLD-RTO: 15 % (ref 12.4–15.4)
PLATELET # BLD: 211 K/UL (ref 135–450)
PMV BLD AUTO: 8.3 FL (ref 5–10.5)
POTASSIUM REFLEX MAGNESIUM: 3.6 MMOL/L (ref 3.5–5.1)
RBC # BLD: 3.76 M/UL (ref 4–5.2)
SODIUM BLD-SCNC: 138 MMOL/L (ref 136–145)
WBC # BLD: 9.4 K/UL (ref 4–11)

## 2021-03-22 PROCEDURE — 80048 BASIC METABOLIC PNL TOTAL CA: CPT

## 2021-03-22 PROCEDURE — 6360000002 HC RX W HCPCS: Performed by: INTERNAL MEDICINE

## 2021-03-22 PROCEDURE — 96376 TX/PRO/DX INJ SAME DRUG ADON: CPT

## 2021-03-22 PROCEDURE — 96372 THER/PROPH/DIAG INJ SC/IM: CPT

## 2021-03-22 PROCEDURE — 6360000002 HC RX W HCPCS: Performed by: EMERGENCY MEDICINE

## 2021-03-22 PROCEDURE — 36415 COLL VENOUS BLD VENIPUNCTURE: CPT

## 2021-03-22 PROCEDURE — 85025 COMPLETE CBC W/AUTO DIFF WBC: CPT

## 2021-03-22 PROCEDURE — 6370000000 HC RX 637 (ALT 250 FOR IP): Performed by: EMERGENCY MEDICINE

## 2021-03-22 PROCEDURE — G0378 HOSPITAL OBSERVATION PER HR: HCPCS

## 2021-03-22 PROCEDURE — 6370000000 HC RX 637 (ALT 250 FOR IP): Performed by: INTERNAL MEDICINE

## 2021-03-22 PROCEDURE — 2580000003 HC RX 258: Performed by: INTERNAL MEDICINE

## 2021-03-22 PROCEDURE — 99244 OFF/OP CNSLTJ NEW/EST MOD 40: CPT | Performed by: INTERNAL MEDICINE

## 2021-03-22 RX ORDER — ACETAZOLAMIDE 250 MG/1
250 TABLET ORAL 2 TIMES DAILY
Status: DISCONTINUED | OUTPATIENT
Start: 2021-03-22 | End: 2021-03-23 | Stop reason: HOSPADM

## 2021-03-22 RX ORDER — KETOROLAC TROMETHAMINE 30 MG/ML
30 INJECTION, SOLUTION INTRAMUSCULAR; INTRAVENOUS EVERY 6 HOURS
Status: DISCONTINUED | OUTPATIENT
Start: 2021-03-22 | End: 2021-03-23 | Stop reason: HOSPADM

## 2021-03-22 RX ADMIN — MORPHINE SULFATE 4 MG: 4 INJECTION, SOLUTION INTRAMUSCULAR; INTRAVENOUS at 15:04

## 2021-03-22 RX ADMIN — LOSARTAN POTASSIUM 100 MG: 100 TABLET, FILM COATED ORAL at 08:32

## 2021-03-22 RX ADMIN — ACETAMINOPHEN 650 MG: 325 TABLET ORAL at 00:56

## 2021-03-22 RX ADMIN — Medication 10 ML: at 20:49

## 2021-03-22 RX ADMIN — NITROGLYCERIN 0.5 INCH: 20 OINTMENT TOPICAL at 00:56

## 2021-03-22 RX ADMIN — MORPHINE SULFATE 4 MG: 4 INJECTION, SOLUTION INTRAMUSCULAR; INTRAVENOUS at 02:02

## 2021-03-22 RX ADMIN — Medication 10 ML: at 08:33

## 2021-03-22 RX ADMIN — MORPHINE SULFATE 4 MG: 4 INJECTION, SOLUTION INTRAMUSCULAR; INTRAVENOUS at 11:10

## 2021-03-22 RX ADMIN — KETOROLAC TROMETHAMINE 30 MG: 30 INJECTION, SOLUTION INTRAMUSCULAR at 13:21

## 2021-03-22 RX ADMIN — ACETAMINOPHEN 650 MG: 325 TABLET ORAL at 08:31

## 2021-03-22 RX ADMIN — MORPHINE SULFATE 4 MG: 4 INJECTION, SOLUTION INTRAMUSCULAR; INTRAVENOUS at 22:27

## 2021-03-22 RX ADMIN — HYDROCHLOROTHIAZIDE 25 MG: 25 TABLET ORAL at 08:32

## 2021-03-22 RX ADMIN — NITROGLYCERIN 0.5 INCH: 20 OINTMENT TOPICAL at 05:46

## 2021-03-22 RX ADMIN — KETOROLAC TROMETHAMINE 30 MG: 30 INJECTION, SOLUTION INTRAMUSCULAR at 18:29

## 2021-03-22 RX ADMIN — ACETAZOLAMIDE 250 MG: 250 TABLET ORAL at 20:48

## 2021-03-22 RX ADMIN — ACETAZOLAMIDE 250 MG: 250 TABLET ORAL at 11:54

## 2021-03-22 RX ADMIN — ENOXAPARIN SODIUM 40 MG: 40 INJECTION, SOLUTION INTRAVENOUS; SUBCUTANEOUS at 20:49

## 2021-03-22 ASSESSMENT — PAIN DESCRIPTION - LOCATION
LOCATION: SHOULDER
LOCATION: CHEST;SHOULDER
LOCATION: SHOULDER
LOCATION: ARM;SHOULDER

## 2021-03-22 ASSESSMENT — PAIN SCALES - GENERAL
PAINLEVEL_OUTOF10: 7
PAINLEVEL_OUTOF10: 10
PAINLEVEL_OUTOF10: 7
PAINLEVEL_OUTOF10: 6
PAINLEVEL_OUTOF10: 4
PAINLEVEL_OUTOF10: 5
PAINLEVEL_OUTOF10: 7
PAINLEVEL_OUTOF10: 7

## 2021-03-22 ASSESSMENT — PAIN DESCRIPTION - DESCRIPTORS
DESCRIPTORS: CONSTANT;DULL
DESCRIPTORS: CONSTANT
DESCRIPTORS: SHARP

## 2021-03-22 ASSESSMENT — PAIN DESCRIPTION - PROGRESSION
CLINICAL_PROGRESSION: GRADUALLY WORSENING
CLINICAL_PROGRESSION: GRADUALLY WORSENING

## 2021-03-22 ASSESSMENT — PAIN DESCRIPTION - PAIN TYPE
TYPE: ACUTE PAIN
TYPE: ACUTE PAIN

## 2021-03-22 ASSESSMENT — PAIN DESCRIPTION - ONSET
ONSET: ON-GOING

## 2021-03-22 ASSESSMENT — PAIN DESCRIPTION - ORIENTATION
ORIENTATION: LEFT
ORIENTATION: LEFT

## 2021-03-22 ASSESSMENT — PAIN DESCRIPTION - INTENSITY: RATING_2: 0

## 2021-03-22 NOTE — PLAN OF CARE
Problem: Pain:  Goal: Pain level will decrease  Description: Pain level will decrease  3/21/2021 2157 by Lucas Barragan RN  Outcome: Ongoing  3/21/2021 2046 by Evelio Ordonez RN  Outcome: Ongoing  Goal: Control of acute pain  Description: Control of acute pain  3/21/2021 2157 by Lucas Barragan RN  Outcome: Ongoing  3/21/2021 2046 by Evelio Ordonez RN  Outcome: Ongoing  Goal: Control of chronic pain  Description: Control of chronic pain  3/21/2021 2046 by Evelio Ordonez RN  Outcome: Ongoing  Goal: Patient's pain/discomfort is manageable  Description: Patient's pain/discomfort is manageable  3/21/2021 2046 by Evelio Ordonez RN  Outcome: Ongoing     Problem: Infection:  Goal: Will remain free from infection  Description: Will remain free from infection  3/21/2021 2046 by Evelio Ordonez RN  Outcome: Ongoing     Problem: Safety:  Goal: Free from accidental physical injury  Description: Free from accidental physical injury  3/21/2021 2046 by Evelio Ordonez RN  Outcome: Ongoing  Goal: Free from intentional harm  Description: Free from intentional harm  3/21/2021 2046 by Evelio Ordonez RN  Outcome: Ongoing     Problem: Daily Care:  Goal: Daily care needs are met  Description: Daily care needs are met  3/21/2021 2046 by Evelio Ordonez RN  Outcome: Ongoing     Problem: Skin Integrity:  Goal: Skin integrity will stabilize  Description: Skin integrity will stabilize  3/21/2021 2046 by Evelio Ordonez RN  Outcome: Ongoing     Problem: Discharge Planning:  Goal: Patients continuum of care needs are met  Description: Patients continuum of care needs are met  3/21/2021 2046 by Evelio Ordonez RN  Outcome: Ongoing     Problem: Cardiac:  Goal: Ability to maintain vital signs within normal range will improve  Description: Ability to maintain vital signs within normal range will improve  Outcome: Ongoing  Goal: Cardiovascular alteration will improve  Description: Cardiovascular alteration will improve  Outcome: Ongoing

## 2021-03-22 NOTE — PROGRESS NOTES
Pt transferred from [facility]. VSS. Pt aware of POC. Pt states CP and left shoulder pain improved after Morphine. Call light within reach, non skid socks on. Pt instructed to call out for assistance to bathroom. Will continue to monitor.

## 2021-03-22 NOTE — PROGRESS NOTES
Shift assessment complete. VSS, see flowsheets. Medications administered per MAR. Pt states chest pain is currently 7/10 and has a headache 5/10, due to Nitro. PRN pain medications administered. Aware of NPO status at midnight. Fall precautions in place, hourly rounding, call light and belongings in reach, bed in lowest position, wheels locked in place, side rails up x 2, walkways free of clutter, bed alarm on. Pt is resting comfortably at this time. Denies needs, will continue to monitor.

## 2021-03-22 NOTE — CONSULTS
[facility]  [phone]        Reason for Consultation/Chief Complaint: \" Chest pain. \"    History of Present Illness:  Jake José is a 48 y.o. patient who presented to the hospital with complaints of chest pain. She describes 1 week of posteior neck, left upper back and arm discomfort. Worse with turning her neck and with certain movements. Started on sterorids by her PCP with a little improvement but no resolution. Last night was awakened with chest discomfort, like an elephant sitting on her, with worsening of the left arm and shoulder pain. Walking makes the arm pain worse. Chest pain was present 6 am to 9pm at which time she fell asleep and when she awakened it was gone. Worse with laying flat which was similar to when she had pericarditis in the past.    Past Medical History:   has a past medical history of Achilles rupture, Asthma, Hyperlipidemia, Hypertension, Murmur, heart, Obstructive sleep apnea (adult) (pediatric), Pericarditis, Prolonged emergence from general anesthesia, and Pseudotumor cerebri. Surgical History:   has a past surgical history that includes Hysterectomy (1/2009); Tonsillectomy (2005); Colonoscopy (2008); Tubal ligation; Ovary removal (2019); Achilles tendon surgery; and Colonoscopy (N/A, 12/18/2020). Social History:   reports that she has never smoked. She has never used smokeless tobacco. She reports current alcohol use of about 1.0 - 2.0 standard drinks of alcohol per week. She reports that she does not use drugs. Family History:  family history includes Breast Cancer in her mother; Cancer in her father; Coronary Art Dis in her brother; Diabetes in her father, mother, and sister; Heart Failure in her father; Hypertension in her father; Kidney Disease in her father; Migraines in her father; Osteoporosis in her mother; Other in her father, mother, and sister; Stroke in her father.     Home Medications:  Were reviewed and are listed in nursing record. and/or listed below  Prior to Admission medications    Medication Sig Start Date End Date Taking? Authorizing Provider   acetaZOLAMIDE (DIAMOX) 250 MG tablet TAKE 1 TABLET TWICE A DAY (KEEP APPOINTMENT FOR FUTURE REFILLS PLEASE) 3/18/21  Yes Carter Hyatt MD   predniSONE (DELTASONE) 50 MG tablet Take 1 tablet by mouth daily for 5 days 3/18/21 3/23/21 Yes Rashid Downs MD   rosuvastatin (CRESTOR) 20 MG tablet Take 1 tablet by mouth daily 3/10/21  Yes Rashid Downs MD   estradiol (ESTRACE) 0.1 MG/GM vaginal cream INSERT 1 GRAM INTO VAGINA EVERY MONDAY AND THURSDAY 1/26/21  Yes Historical Provider, MD   olmesartan-hydroCHLOROthiazide (BENICAR HCT) 40-25 MG per tablet Take 1 tablet by mouth daily 3/5/21  Yes Rashid Downs MD   MULTIPLE VITAMIN PO Take by mouth   Yes Historical Provider, MD   Omega-3 Fatty Acids (FISH OIL PO) Take by mouth   Yes Historical Provider, MD   VITAMIN D PO Take by mouth   Yes Historical Provider, MD   UNABLE TO [address]   Yes Historical Provider, MD   cetirizine (ZYRTEC) 10 MG tablet Take 1 tablet by mouth daily  Patient taking differently: Take 10 mg by mouth daily as needed  3/27/18  Yes Rashid Downs MD        Allergies:  Lisinopril     Review of Systems:   A complete review of systems has been reviewed and updated today and is negative except as noted in the history of present illness.       Physical Examination:    Vitals:    03/22/21 1105   BP: (!) 146/93   Pulse: 62   Resp: 16   Temp: 98.1 °F (36.7 °C)   SpO2: 99%    Weight: 251 lb 4.8 oz (114 kg)         General Appearance:  Alert, cooperative, no distress, appears stated age   Head:  Normocephalic, without obvious abnormality, atraumatic   Eyes:  EOMI, conjunctiva/corneas clear       Nose: Nares normal   Throat: Lips normal   Neck: Supple, symmetrical, trachea midline,  no carotid bruit or JVD       Lungs:   Clear to auscultation bilaterally, respirations unlabored   Chest Wall: fraction is visually estimated to be 55-60 %. No regional wall   motion abnormalities are noted. Diastolic filling parameters suggests normal diastolic function . The mitral valve leaflets are thickened but open   adequately. Mild mitral regurgitation is present. There is mild tricuspid   regurgitation with RVSP estimated at 26 mmHg. There is a pleural effusion. Stress echo 7/23/2015:   Summary   Normal stress echocardiogram study. Assessment/Plan:  Principal Problem:    Chest pain  Plan: Atypical particularly given duration. Would agree with plans for Myoview stress test.  Trial of Toradol for musculoskeletal discomfort. Active Problems:    Hypertension  Plan: Mildly elevated. Thank you for allowing us to participate in the care of Domi Esquivel. Further evaluation will be based upon the patient's clinical course and testing results. All questions and concerns were addressed to the patient/family. Alternatives to my treatment were discussed. The note was completed using EMR. Every effort was made to ensure accuracy; however, inadvertent computerized transcription errors may be present.     Joyce Catalan M.D.

## 2021-03-22 NOTE — PLAN OF CARE
Problem: Pain:  Goal: Pain level will decrease  Description: Pain level will decrease  Outcome: Ongoing  Goal: Control of acute pain  Description: Control of acute pain  Outcome: Ongoing  Goal: Control of chronic pain  Description: Control of chronic pain  Outcome: Ongoing  Goal: Patient's pain/discomfort is manageable  Description: Patient's pain/discomfort is manageable  Outcome: Ongoing     Problem: Infection:  Goal: Will remain free from infection  Description: Will remain free from infection  Outcome: Ongoing     Problem: Safety:  Goal: Free from accidental physical injury  Description: Free from accidental physical injury  Outcome: Ongoing     Problem: Daily Care:  Goal: Daily care needs are met  Description: Daily care needs are met  Outcome: Ongoing     Problem: Skin Integrity:  Goal: Skin integrity will stabilize  Description: Skin integrity will stabilize  Outcome: Ongoing     Problem: Discharge Planning:  Goal: Patients continuum of care needs are met  Description: Patients continuum of care needs are met  Outcome: Ongoing

## 2021-03-22 NOTE — PLAN OF CARE
Problem: Pain:  Goal: Pain level will decrease  Description: Pain level will decrease  3/22/2021 0909 by Familia Lujan, RN  Outcome: Ongoing  On going pain on shoulder that radiates to left arm. Prn morphine available, pt currently refusing pain med. Pt educated on pain rate. Will monitor     Problem: Cardiac:  Goal: Ability to maintain vital signs within normal range will improve  Description: Ability to maintain vital signs within normal range will improve  3/22/2021 0909 by Familia Lujan RN  Outcome: Ongoing   VSS on RA. SB on monitor. Pt alert and oriented. Call light with in reach.  Will monitor

## 2021-03-23 VITALS
HEART RATE: 66 BPM | TEMPERATURE: 98 F | SYSTOLIC BLOOD PRESSURE: 139 MMHG | RESPIRATION RATE: 16 BRPM | HEIGHT: 63 IN | DIASTOLIC BLOOD PRESSURE: 84 MMHG | OXYGEN SATURATION: 96 % | WEIGHT: 250.3 LBS | BODY MASS INDEX: 44.35 KG/M2

## 2021-03-23 LAB
LV EF: 60 %
LV EF: 63 %
LVEF MODALITY: NORMAL
LVEF MODALITY: NORMAL

## 2021-03-23 PROCEDURE — A9502 TC99M TETROFOSMIN: HCPCS | Performed by: INTERNAL MEDICINE

## 2021-03-23 PROCEDURE — 3430000000 HC RX DIAGNOSTIC RADIOPHARMACEUTICAL: Performed by: INTERNAL MEDICINE

## 2021-03-23 PROCEDURE — 6360000002 HC RX W HCPCS: Performed by: INTERNAL MEDICINE

## 2021-03-23 PROCEDURE — 6370000000 HC RX 637 (ALT 250 FOR IP): Performed by: NURSE PRACTITIONER

## 2021-03-23 PROCEDURE — 93017 CV STRESS TEST TRACING ONLY: CPT | Performed by: INTERNAL MEDICINE

## 2021-03-23 PROCEDURE — 2580000003 HC RX 258: Performed by: INTERNAL MEDICINE

## 2021-03-23 PROCEDURE — 99218 PR INITIAL OBSERVATION CARE/DAY 30 MINUTES: CPT | Performed by: NURSE PRACTITIONER

## 2021-03-23 PROCEDURE — 96376 TX/PRO/DX INJ SAME DRUG ADON: CPT

## 2021-03-23 PROCEDURE — G0378 HOSPITAL OBSERVATION PER HR: HCPCS

## 2021-03-23 PROCEDURE — 99214 OFFICE O/P EST MOD 30 MIN: CPT | Performed by: NURSE PRACTITIONER

## 2021-03-23 PROCEDURE — 78452 HT MUSCLE IMAGE SPECT MULT: CPT

## 2021-03-23 PROCEDURE — 6370000000 HC RX 637 (ALT 250 FOR IP): Performed by: INTERNAL MEDICINE

## 2021-03-23 PROCEDURE — 93306 TTE W/DOPPLER COMPLETE: CPT

## 2021-03-23 RX ORDER — METHOCARBAMOL 750 MG/1
750 TABLET, FILM COATED ORAL 4 TIMES DAILY PRN
Status: DISCONTINUED | OUTPATIENT
Start: 2021-03-23 | End: 2021-03-23 | Stop reason: HOSPADM

## 2021-03-23 RX ORDER — PREDNISONE 10 MG/1
TABLET ORAL
Qty: 42 TABLET | Refills: 0 | Status: SHIPPED | OUTPATIENT
Start: 2021-03-23 | End: 2021-07-09 | Stop reason: ALTCHOICE

## 2021-03-23 RX ORDER — METHOCARBAMOL 750 MG/1
750 TABLET, FILM COATED ORAL 4 TIMES DAILY PRN
Qty: 40 TABLET | Refills: 0 | Status: SHIPPED | OUTPATIENT
Start: 2021-03-23 | End: 2021-04-13 | Stop reason: SDUPTHER

## 2021-03-23 RX ADMIN — TETROFOSMIN 30 MILLICURIE: 1.38 INJECTION, POWDER, LYOPHILIZED, FOR SOLUTION INTRAVENOUS at 08:34

## 2021-03-23 RX ADMIN — ACETAMINOPHEN 650 MG: 325 TABLET ORAL at 05:10

## 2021-03-23 RX ADMIN — Medication 10 ML: at 02:11

## 2021-03-23 RX ADMIN — HYDROCHLOROTHIAZIDE 25 MG: 25 TABLET ORAL at 10:23

## 2021-03-23 RX ADMIN — METHOCARBAMOL TABLETS 750 MG: 750 TABLET, COATED ORAL at 11:36

## 2021-03-23 RX ADMIN — LOSARTAN POTASSIUM 100 MG: 100 TABLET, FILM COATED ORAL at 10:23

## 2021-03-23 RX ADMIN — ACETAMINOPHEN 650 MG: 325 TABLET ORAL at 15:17

## 2021-03-23 RX ADMIN — KETOROLAC TROMETHAMINE 30 MG: 30 INJECTION, SOLUTION INTRAMUSCULAR at 02:10

## 2021-03-23 RX ADMIN — TETROFOSMIN 10 MILLICURIE: 1.38 INJECTION, POWDER, LYOPHILIZED, FOR SOLUTION INTRAVENOUS at 07:27

## 2021-03-23 RX ADMIN — KETOROLAC TROMETHAMINE 30 MG: 30 INJECTION, SOLUTION INTRAMUSCULAR at 11:37

## 2021-03-23 RX ADMIN — REGADENOSON 0.4 MG: 0.08 INJECTION, SOLUTION INTRAVENOUS at 08:13

## 2021-03-23 RX ADMIN — ACETAZOLAMIDE 250 MG: 250 TABLET ORAL at 10:23

## 2021-03-23 RX ADMIN — ACETAMINOPHEN 650 MG: 325 TABLET ORAL at 10:23

## 2021-03-23 RX ADMIN — Medication 10 ML: at 10:23

## 2021-03-23 RX ADMIN — KETOROLAC TROMETHAMINE 30 MG: 30 INJECTION, SOLUTION INTRAMUSCULAR at 06:53

## 2021-03-23 ASSESSMENT — PAIN DESCRIPTION - FREQUENCY
FREQUENCY: CONTINUOUS
FREQUENCY: CONTINUOUS

## 2021-03-23 ASSESSMENT — PAIN DESCRIPTION - PAIN TYPE
TYPE: ACUTE PAIN
TYPE: ACUTE PAIN

## 2021-03-23 ASSESSMENT — PAIN DESCRIPTION - LOCATION: LOCATION: ARM;BACK;SHOULDER

## 2021-03-23 ASSESSMENT — PAIN SCALES - GENERAL
PAINLEVEL_OUTOF10: 6
PAINLEVEL_OUTOF10: 3
PAINLEVEL_OUTOF10: 6

## 2021-03-23 ASSESSMENT — PAIN DESCRIPTION - PROGRESSION: CLINICAL_PROGRESSION: GRADUALLY WORSENING

## 2021-03-23 ASSESSMENT — PAIN - FUNCTIONAL ASSESSMENT: PAIN_FUNCTIONAL_ASSESSMENT: PREVENTS OR INTERFERES SOME ACTIVE ACTIVITIES AND ADLS

## 2021-03-23 NOTE — PLAN OF CARE
Problem: Pain:  Goal: Pain level will decrease  Description: Pain level will decrease  3/22/2021 2103 by Vern Monte RN  Outcome: Ongoing  3/22/2021 0909 by David Yang RN  Outcome: Ongoing  Goal: Control of acute pain  Description: Control of acute pain  Outcome: Ongoing     Problem: Cardiac:  Goal: Ability to maintain vital signs within normal range will improve  Description: Ability to maintain vital signs within normal range will improve  3/22/2021 2103 by Vern Monte RN  Outcome: Ongoing  3/22/2021 0909 by David Yang RN  Outcome: Ongoing  Goal: Cardiovascular alteration will improve  Description: Cardiovascular alteration will improve  Outcome: Ongoing

## 2021-03-23 NOTE — PROGRESS NOTES
(2005); Colonoscopy (2008); Tubal ligation; Ovary removal (2019); Achilles tendon surgery; and Colonoscopy (N/A, 12/18/2020). Social History:  Reviewed. reports that she has never smoked. She has never used smokeless tobacco. She reports current alcohol use of about 1.0 - 2.0 standard drinks of alcohol per week. She reports that she does not use drugs. Allergies: Allergies   Allergen Reactions    Lisinopril        Family History:  Reviewed. family history includes Breast Cancer in her mother; Cancer in her father; Coronary Art Dis in her brother; Diabetes in her father, mother, and sister; Heart Failure in her father; Hypertension in her father; Kidney Disease in her father; Migraines in her father; Osteoporosis in her mother; Other in her father, mother, and sister; Stroke in her father. Denies family history of sudden cardiac death, arrhythmia, premature CAD    Home Meds:  Prior to Visit Medications    Medication Sig Taking?  Authorizing Provider   acetaZOLAMIDE (DIAMOX) 250 MG tablet TAKE 1 TABLET TWICE A DAY (KEEP APPOINTMENT FOR FUTURE REFILLS PLEASE) Yes Young Hernadez MD   predniSONE (DELTASONE) 50 MG tablet Take 1 tablet by mouth daily for 5 days Yes Sal Gibbs MD   rosuvastatin (CRESTOR) 20 MG tablet Take 1 tablet by mouth daily Yes Sal Gibbs MD   estradiol (ESTRACE) 0.1 MG/GM vaginal cream INSERT 1 [address] [employer] Yes Historical Provider, MD   olmesartan-hydroCHLOROthiazide (BENICAR HCT) 40-25 MG per tablet Take 1 tablet by mouth daily Yes Sal Gibbs MD   MULTIPLE VITAMIN PO Take by mouth Yes Historical Provider, MD   Omega-3 Fatty Acids (FISH OIL PO) Take by mouth Yes Historical Provider, MD   VITAMIN D PO Take by mouth Yes Historical Provider, MD   UNABLE TO [address] Yes Historical Provider, MD   cetirizine (ZYRTEC) 10 MG tablet Take 1 tablet by mouth daily  Patient taking differently: Take 10 mg by mouth daily as needed  Yes Yuni Rodriguez MD        Scheduled Meds:   acetaZOLAMIDE  250 mg Oral BID    ketorolac  30 mg Intravenous Q6H    aspirin  325 mg Oral Once    nitroglycerin  0.5 inch Topical 4 times per day    sodium chloride flush  10 mL Intravenous 2 times per day    enoxaparin  40 mg Subcutaneous BID    losartan  100 mg Oral Daily    hydroCHLOROthiazide  25 mg Oral Daily     Continuous Infusions:  PRN Meds:technetium tetrofosmin, morphine, sodium chloride flush, promethazine **OR** ondansetron, polyethylene glycol, acetaminophen **OR** acetaminophen, perflutren lipid microspheres     Review of Systems:  · Constitutional: Negative for fever, night sweats, chills, weight changes  · Skin: Negative for rash, pruritus, bleeding, blood clots, or bruising    · HEENT: Negative for vision changes, ringing in the ears, dysphagia, or swollen lymph nodes  · Respiratory: Reviewed in HPI  · Cardiovascular: Reviewed in HPI  · Gastrointestinal: Negative for abdominal pain, N/V/D, constipation, or black/tarry stools  · Genito-Urinary: Negative for dysuria, incontinence, or hematuria  · Musculoskeletal: Negative for joint swelling, muscle pain, or injuries  · Neurological/Psych: Negative for confusion, seizures, headaches, or TIA-like symptoms. No anxiety or depression. Physical Examination:  Vitals:    03/23/21 0430   BP: (!) 134/94   Pulse: 68   Resp: 16   Temp: 98.8 °F (37.1 °C)   SpO2: 95%      No intake/output data recorded.    Wt Readings from Last 3 Encounters:   03/23/21 250 lb 4.8 oz (113.5 kg)   03/05/21 247 lb (112 kg)   01/26/21 247 lb (112 kg)       Intake/Output Summary (Last 24 hours) at 3/23/2021 0476  Last data filed at 3/22/2021 1300  Gross per 24 hour   Intake 250 ml   Output    Net 250 ml       Telemetry: Personally Reviewed  - Sinus rhythm   · Constitutional: Cooperative and in no apparent distress, and appears well nourished  · Skin: Warm and pink; no pallor, cyanosis, clubbing, or bruising   · HEENT: Symmetric and normocephalic. PERRL, EOM intact. Conjunctiva pink with clear sclera. Mucus membranes moist.   · Cardiovascular: Regular rate and rhythm. S1/S2 present without murmurs, no rubs or gallops. Peripheral pulses 2+, capillary refill < 3 seconds. No elevation of JVP. No peripheral edema  · Respiratory: Respirations symmetric and unlabored. Lungs clear to auscultation bilaterally, no wheezing, crackles, or rhonchi  · Gastrointestinal: Abdomen soft and round. Bowel sounds normoactive without tenderness or masses. · Musculoskeletal: Bilateral upper and lower extremity strength 5/5 with full ROM  · Neurologic/Psych: Awake and orientated to person, place and time. Calm affect, appropriate mood    Pertinent labs, diagnostic, device, and imaging results reviewed as a part of this visit    Labs:    BMP:   Recent Labs     03/21/21  0715 03/22/21  0539    138   K 3.8 3.6    105   CO2 21 26   BUN 22* 24*   CREATININE 1.1 1.1     Estimated Creatinine Clearance: 74 mL/min (based on SCr of 1.1 mg/dL).    CBC:   Recent Labs     03/21/21  0715 03/22/21  0539   WBC 11.4* 9.4   HGB 13.1 12.1   HCT 42.9 38.2   .1* 101.8*    211     Thyroid:   Lab Results   Component Value Date    TSH 0.89 10/06/2016     Lipids:   Lab Results   Component Value Date    CHOL 232 03/05/2021    HDL 38 03/05/2021    HDL 35 12/20/2011    TRIG 149 03/05/2021     LFTS:   Lab Results   Component Value Date    ALT 15 03/05/2021    AST 16 03/05/2021    ALKPHOS 73 03/05/2021    PROT 7.2 03/05/2021    PROT 7.4 11/09/2011    AGRATIO 1.3 03/05/2021    BILITOT <0.2 03/05/2021     Cardiac Enzymes:   Lab Results   Component Value Date    CKTOTAL 143 12/20/2011    TROPONINI <0.01 03/21/2021    TROPONINI <0.01 03/21/2021    TROPONINI <0.01 03/21/2021     Coags:   Lab Results   Component Value Date    PROTIME 11.2 03/21/2021    INR 0.97 03/21/2021     ECG: 3/21/21  Normal sinus rhythm    ECHO:  3/23/21  Summary   -Normal left ventricle size, wall thickness, and systolic function with an   estimated ejection fraction of 60%. -No regional wall motion abnormalities are seen.   -Average global longitudinal strain is estimated at -20.3% (Normal)   -There is mild mitral regurgitation noted. -There is trivial tricuspid regurgitation with a RVSP estimation of 24 mmHg.   -Normal diastolic function. Avg. E/e'=12.7    Stress Test: 3/23/21   Summary    Normal LV size and systolic function.    Left ventricular ejection fraction of 63%.    There is normal isotope uptake at stress and rest.    There is no evidence of myocardial ischemia or scar. CXR: 3/21/21  No acute cardiopulmonary disease. CTPA: 3/21/21  No findings to suggest large central pulmonary embolism.       Patchy ground-glass opacity at the lower lobes, likely atelectasis in the   absence of clinical signs or symptoms of pneumonitis.       Hepatic steatosis.       Nonobstructing left nephrolithiasis. Problem List:   Patient Active Problem List    Diagnosis Date Noted    PVC (premature ventricular contraction) 02/27/2017     Priority: High    Hypertension 11/26/2010     Priority: High    Hyperlipidemia 11/26/2010     Priority: High    Chest pain 03/21/2021    Adenomatous polyp of cecum     Adenomatous polyp of ascending colon     Diverticulosis of colon without hemorrhage     Internal hemorrhoids without complication     Obstructive sleep apnea syndrome     Mild persistent asthma 04/18/2017    Palpitations 02/08/2017    Benign intracranial hypertension 11/29/2013    Pseudotumor cerebri 12/21/2011    Morbid obesity ([facility] [address] 12/20/2011        Assessment and Plan:     Chest pain   ~ atypical   ~ Myoview stress test without evidence of ischemia or scar   ~ Echo unremarkable   ~ trial of Toradol with improvement, has resolved today     Hypertension   ~ improved, goal <130/80    Pt stable from a cardiac standpoint and is ok for discharge. Can follow up with cardiology as needed. Do not hesitate to call with questions or concerns. Multiple medical conditions with risk of decompensation. All pertinent information and plan of care discussed with the physician. All questions and concerns were addressed to the patient. Alternatives to my treatment were discussed. I have discussed the above stated plan with patient and the nurse. The patient verbalized understanding and agreed with the plan. Thank you for allowing to us to participate in the care of [facility].     Chiquita Ordoñez APRN-CNP  [address]   Office: [phone]

## 2021-03-23 NOTE — PROGRESS NOTES
Instructed on Lexiscan Stress Test Procedure including possible side effects/ adverse reactions. Patient verbalizes  understanding and denies having any questions. See [address] [place] [address] Rd Cardiology.   Melyssa Lehman RN

## 2021-03-23 NOTE — DISCHARGE SUMMARY
180 tablet, Refills: 0    Associated Diagnoses: Pseudotumor cerebri      predniSONE (DELTASONE) 50 MG tablet Take 1 tablet by mouth daily for 5 days  Qty: 5 tablet, Refills: 0      rosuvastatin (CRESTOR) 20 MG tablet Take 1 tablet by mouth daily  Qty: 90 tablet, Refills: 2      estradiol (ESTRACE) 0.1 MG/GM vaginal cream INSERT 1 GRAM INTO VAGINA EVERY MONDAY AND THURSDAY      olmesartan-hydroCHLOROthiazide (BENICAR HCT) 40-25 MG per tablet Take 1 tablet by mouth daily  Qty: 90 tablet, Refills: 2    Associated Diagnoses: Essential hypertension      MULTIPLE VITAMIN PO Take by mouth      Omega-3 Fatty Acids (FISH OIL PO) Take by mouth      VITAMIN D PO Take by mouth      UNABLE TO FIND Black Seed Oil      cetirizine (ZYRTEC) 10 MG tablet Take 1 tablet by mouth daily  Qty: 30 tablet, Refills: 5           Current Discharge Medication List          Labs: For convenience and continuity at follow-up the following most recent labs are provided:    Lab Results   Component Value Date    WBC 9.4 03/22/2021    HGB 12.1 03/22/2021    HCT 38.2 03/22/2021    .8 03/22/2021     03/22/2021     03/22/2021    K 3.6 03/22/2021     03/22/2021    CO2 26 03/22/2021    BUN 24 03/22/2021    CREATININE 1.1 03/22/2021    CALCIUM 8.8 03/22/2021    PHOS 2.5 03/28/2016    ALKPHOS 73 03/05/2021    ALT 15 03/05/2021    AST 16 03/05/2021    BILITOT <0.2 03/05/2021    BILIDIR <0.2 03/28/2016    LABALBU 4.1 03/05/2021    LDLCALC 164 03/05/2021    TRIG 149 03/05/2021     Lab Results   Component Value Date    INR 0.97 03/21/2021    INR 1.14 12/21/2011    INR 1.19 (H) 12/19/2011       Radiology:  Xr Chest Portable    Result Date: 3/21/2021  EXAMINATION: ONE XRAY VIEW OF THE CHEST 3/21/2021 7:25 am COMPARISON: 11/27/2017 HISTORY: ORDERING SYSTEM PROVIDED HISTORY: cp TECHNOLOGIST PROVIDED HISTORY: Reason for exam:->cp Acuity: Unknown FINDINGS: Single view of the chest was obtained.   No confluent focal pulmonary opacities to suggest acute airspace disease. No evidence of pleural effusion or pneumothorax. Cardiac and mediastinal silhouettes are within normal limits. No acute cardiopulmonary disease. Ct Chest Pulmonary Embolism W Contrast    Result Date: 3/21/2021  EXAMINATION: CTA OF THE CHEST 3/21/2021 8:09 am TECHNIQUE: CTA of the chest was performed after the administration of intravenous contrast.  Multiplanar reformatted images are provided for review. MIP images are provided for review. Dose modulation, iterative reconstruction, and/or weight based adjustment of the mA/kV was utilized to reduce the radiation dose to as low as reasonably achievable. COMPARISON: 06/01/2017 HISTORY: ORDERING SYSTEM PROVIDED HISTORY: cp/sob ro pe, + ddimer, pleuritic pain, fhx hypercoag TECHNOLOGIST PROVIDED HISTORY: Reason for exam:->cp/sob ro pe, + ddimer, pleuritic pain, fhx hypercoag Decision Support Exception->Emergency Medical Condition (MA) Reason for Exam: cp, sob, R/O Pe Acuity: Unknown Type of Exam: Unknown FINDINGS: Pulmonary Arteries: Within the main, central most right, central most left pulmonary arteries, no evidence of filling defect to suggest large central pulmonary embolism. Optimal peripheral branch evaluation is limited by artifact. Mediastinum: Calcification of the thoracic aorta. Within the aorta, no gross intraluminal flap. Within the mediastinum, no pathologic lymphadenopathy by size criteria. Thyroid is symmetric. No axillary adenopathy. Lungs/pleura: Mild patchy ground-glass opacity is seen within the lower lobes. No pneumothorax or pleural effusion. Upper Abdomen: Liver is fatty. 4 mm nonobstructing calculus within the upper pole of the left kidney. Soft Tissues/Bones: Degenerative change of the spine. No findings to suggest large central pulmonary embolism. Patchy ground-glass opacity at the lower lobes, likely atelectasis in the absence of clinical signs or symptoms of pneumonitis. Hepatic steatosis. Nonobstructing left nephrolithiasis. Nm Myocardial Spect Rest Exercise Or Rx    Result Date: 3/23/2021  Cardiac Perfusion Imaging  Demographics   Patient Name       Winter Irwin   Date of Study      03/23/2021         Gender              Female   Patient Number     [id number]         Date of Birth       1970   Visit Number       [id number]          Age                 48 year(s)   Accession Number   [id number]         Room Number         3674   Corporate ID       [id number]            NM Technician       Lisbet Peterson, RT   Nurse              Zach Parrish, RN Interpreting        Mariluz Flores                                        Physician           Vivian Barnes DO, [facility]   Ordering Physician Darion Mendoza MD,                     [facility]   The procedure was explained in detail to the patient. Risks,  complications and alternative treatments were reviewed. Written consent  was obtained. Procedure Procedure Type:   Nuclear Stress Test:Pharmacological, NM MYOCARDIAL SPECT REST EXERCISE OR  RX   Study location: [facility] Nuclear Medicine   Indications: Chest pain. Hospital Status: Outpatient. Height: 63 inches Weight: 251 pounds  Risk Factors   The patient risk factors include:obesity, treated and controlled  hypercholesterolemia, treated and controlled hypertension, family history of  premature CAD, chronic lung disease and dyslipidemia. Conclusions   Summary  Normal LV size and systolic function. Left ventricular ejection fraction of 63%. There is normal isotope uptake at stress and rest.  There is no evidence of myocardial ischemia or scar. Stress Protocols   Resting ECG  Normal sinus rhythm. Resting HR:68 bpm  Resting BP:123/79 mmHg   Pre-stress physical exam: Patient states  she has left arm pain (rated at a 15 on a  scale of 0-10).   Stress Protocol:Pharmacologic - Lexiscan's  Peak HR:107 bpm                              HR/BP product:26009  Peak BP:121/55 mmHg  Predicted HR: 170 bpm  % of predicted HR: 63  Test duration: 4 min  Reason for termination:Completed   ECG Findings  Normal response to lexiscan . Arrhythmias  No significant rhythm abnormality. Symptoms  No symptoms with lexiscan. Patient denies any chest pain and/or  discomfort. Complications  Procedure complication was none. Stress Interpretation  Appropriate hemodynamic response to lexiscan with no significant ST  changes. Procedure Medications   - Lexiscan I.V. 0.4 mg.10 sec  Imaging Protocols   - One Day   Rest                          Stress   Isotope:Myoview/Tetrofosmin   Isotope: Myoview/Tetrofosmin  Isotope dose:10.69 mCi        Isotope dose:32.9 mCi  Administration Route:I.V. Administration Route:I.V.  Date:03/23/2021 07:21         Date:03/23/2021 08:29                                 Technique:      Gated  Imaging Results    Stress ejection    Ejection fraction:63 %    EDV :82 ml    ESV :30 ml    Stroke volume :52 ml    LV mass :117 gr  Medical History   Additional Medical History   Past Medical History: has a past medical history of Achilles  rupture, Asthma, Hyperlipidemia, Hypertension, Murmur, heart,  Obstructive sleep apnea (adult) (pediatric), Pericarditis,  Prolonged emergence from general anesthesia, and Pseudotumor  cerebri. Surgical History: has a past surgical history that includes  Hysterectomy (1/2009); Tonsillectomy (2005); Colonoscopy (2008); Tubal ligation; Ovary removal (2019); Achilles tendon surgery;  and Colonoscopy (N/A, 12/18/2020).    Signatures   ------------------------------------------------------------------  Electronically signed by [facility]  (Interpreting physician) on 03/23/2021 at 10:06  ------------------------------------------------------------------          Signed:    Doreen Mandel MD   3/23/2021

## 2021-03-23 NOTE — PROGRESS NOTES
[facility] PROGRESS NOTE    3/23/2021 1:18 PM        Name: Sukhwinder Gupta . Admitted: 3/21/2021  Primary Care Provider: Chris Busby MD (Tel: [phone])          Subjective:    Chest pain improved now having pain left upper shoulder shortness of breath or fevers    Reviewed interval ancillary notes    Current Medications  methocarbamol (ROBAXIN) tablet 750 mg, 4x Daily PRN  acetaZOLAMIDE (DIAMOX) tablet 250 mg, BID  ketorolac (TORADOL) injection 30 mg, Q6H  aspirin tablet 325 mg, Once  morphine injection 4 mg, Q4H PRN  nitroglycerin (NITRO-BID) 2 % ointment 0.5 inch, 4 times per day  sodium chloride flush 0.9 % injection 10 mL, 2 times per day  sodium chloride flush 0.9 % injection 10 mL, PRN  enoxaparin (LOVENOX) injection 40 mg, BID  promethazine (PHENERGAN) tablet 12.5 mg, Q6H PRN    Or  ondansetron (ZOFRAN) injection 4 mg, Q6H PRN  polyethylene glycol (GLYCOLAX) packet 17 g, Daily PRN  acetaminophen (TYLENOL) tablet 650 mg, Q6H PRN    Or  acetaminophen (TYLENOL) suppository 650 mg, Q6H PRN  perflutren lipid microspheres (DEFINITY) injection 1.65 mg, ONCE PRN  losartan (COZAAR) tablet 100 mg, Daily  hydroCHLOROthiazide (HYDRODIURIL) tablet 25 mg, Daily        Objective:  /84   Pulse 66   Temp 98 °F (36.7 °C) (Oral)   Resp 16   Ht 5' 3\" (1.6 m)   Wt 250 lb 4.8 oz (113.5 kg)   SpO2 96%   BMI 44.34 kg/m²   No intake or output data in the 24 hours ending 03/23/21 1318   Wt Readings from Last 3 Encounters:   03/23/21 250 lb 4.8 oz (113.5 kg)   03/05/21 247 lb (112 kg)   01/26/21 247 lb (112 kg)       General appearance:  Appears comfortable.  AAOx3  HEENT: atraumatic, Pupils equal, muscous membranes moist, no masses appreciated  Cardiovascular: Regular rate and rhythm no murmurs appreciated  Respiratory: CTAB no wheezing  Gastrointestinal: Abdomen soft, non-tender, BS+  EXT: no edema  Neurology: no gross focal deficts  Psychiatry: Appropriate affect. Not agitated  Skin: Warm, dry, no rashes appreciated    Labs and Tests:  CBC:   Recent Labs     03/21/21  0715 03/22/21  0539   WBC 11.4* 9.4   HGB 13.1 12.1    211     BMP:    Recent Labs     03/21/21  0715 03/22/21  0539    138   K 3.8 3.6    105   CO2 21 26   BUN 22* 24*   CREATININE 1.1 1.1   GLUCOSE 93 94     Hepatic: No results for input(s): AST, ALT, ALB, BILITOT, ALKPHOS in the last 72 hours. NM MYOCARDIAL SPECT REST EXERCISE OR RX   Final Result      CT CHEST PULMONARY EMBOLISM W CONTRAST   Final Result   No findings to suggest large central pulmonary embolism. Patchy ground-glass opacity at the lower lobes, likely atelectasis in the   absence of clinical signs or symptoms of pneumonitis. Hepatic steatosis. Nonobstructing left nephrolithiasis. XR CHEST PORTABLE   Final Result   No acute cardiopulmonary disease. Recent imaging reviewed    Problem List  Principal Problem:    Chest pain  Active Problems:    Hypertension    Hyperlipidemia  Resolved Problems:    * No resolved hospital problems.  *       Assessment & Plan:   Chest pain  -  Echo and cards consult pending      Diet: DIET CARDIAC;  Code:Full Code        Georgina Rocha MD   3/23/2021 1:18 PM

## 2021-03-23 NOTE — CONSULTS
[facility] Orthopedic Surgery  Consult Note    Patient: Abel Pollard Date: 3/21/2021  Requesting Physician: Damaris Kirk MD  Room: [address] [place], [place] [address]    Chief complaint: LEFT-sided neck and arm pain    HPI: Jake José is a 48 y.o. female who presented to [facility] ER with complaints of left-sided neck and arm pain extending into the forearm. Denies any injuries. She reports some tingling in all 5 fingers of her left hand and none on her right hand. She denies numbness or weakness of her left upper extremity. She is left-hand dominant. 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 Describes pain in left-sided cervical spine extending into the shoulder and upper arm of moderate intensity and of throbbing, pressure nature since 7 to 10 days ago which is relieved by raising her left arm above her head or partially by Toradol and prednisone. Chest x-ray from admission was reviewed and shows no pathology about the left [facility] joint or left glenohumeral joint. She did take 4 out of 5 days of her prescribed 50 mg of prednisone from her PCP and reported improvement of her left arm symptoms. Unfortunately she woke up with significant chest pain and pressure and presents to the ER on 3/21. Patient uses no assistive devices at baseline to ambulate.       Medical History:  Past Medical History:   Diagnosis Date    Achilles rupture     repaired    Asthma     very mild, normal PFTs    Hyperlipidemia     Hypertension     Murmur, heart     Obstructive sleep apnea (adult) (pediatric)     Pericarditis     Prolonged emergence from general anesthesia     Pseudotumor cerebri 12/21/2011     Past Surgical History:   Procedure Laterality Date    ACHILLES TENDON SURGERY      COLONOSCOPY  2008    COLONOSCOPY N/A 12/18/2020    COLONOSCOPY POLYPECTOMY SNARE/COLD performed by Erika Dang MD at [address]  1/2009    OVARY REMOVAL  2019    TONSILLECTOMY 2005    TUBAL LIGATION         Social History:    reports that she has never smoked. She has never used smokeless tobacco.    Family History:        Problem Relation Age of Onset    Cancer Father     Diabetes Father     Heart Failure Father     Hypertension Father     Stroke Father     Migraines Father     Other Father         HANK    Kidney Disease Father     Coronary Art Dis Brother     Other Sister         HANK    Diabetes Sister     Other Mother         fibromyalgia    Osteoporosis Mother     Diabetes Mother     Breast Cancer Mother        Medications:  ALL MEDICATIONS HAVE BEEN REVIEWED:  Scheduled:   acetaZOLAMIDE  250 mg Oral BID    ketorolac  30 mg Intravenous Q6H    aspirin  325 mg Oral Once    nitroglycerin  0.5 inch Topical 4 times per day    sodium chloride flush  10 mL Intravenous 2 times per day    enoxaparin  40 mg Subcutaneous BID    losartan  100 mg Oral Daily    hydroCHLOROthiazide  25 mg Oral Daily     Continuous:  PRN:methocarbamol, morphine, sodium chloride flush, promethazine **OR** ondansetron, polyethylene glycol, acetaminophen **OR** acetaminophen, perflutren lipid microspheres    Allergies: Allergies   Allergen Reactions    Lisinopril        Review of Systems:  Constitutional: Negative for fever, chills, fatigue. Skin:  Negative for pruritis, rash  Eyes: Negative for photophobia and visual disturbance. ENT:  Negative for rhinorrhea, epistaxis, sore throat  Respiratory:  Negative for cough and shortness of breath. Cardiovascular: Negative for chest pain. Gastrointestinal: Negative for nausea, vomiting, diarrhea. Genitourinary: Negative for dysuria and difficulty urinating. Neurological: Negative for confusion, dysarthria, tremors, seizures. Psychiatric:  Negative for depression or anxiety  Musculoskeletal:  Positive for left arm pain.      Objective:  Vitals:    03/23/21 0956   BP: 137/87   Pulse: 78   Resp: 16   Temp: 97.6 °F (36.4 °C)   SpO2: 95% Physical Examination:  GENERAL: No apparent distress, well-nourished  SKIN:  Warm and dry  EYES: Nonicteric. ENT: Mucous membranes moist  HEAD: Normocephalic, atraumatic  RESPIRATORY: Resp easy and unlabored  CARDIOVASCULAR: Regular rate and rhythm  GI: Abdomen soft, nontender  NEURO: Awake and alert. No speech defect  PSYCHIATRIC: Appropriate affect; not agitated  MUSCULOSKELETAL: Cervical spine and left arm  Inspection: On exam there are no ulcerations, rashes or lesions about the cervical spine or left arm. There is mild discomfort to palpation of the left trapezial musculature. There is no midline tenderness to palpation. There is no tenderness to palpation over the subacromial space, AC joint, or anywhere throughout the left upper extremity distally. Motor: She has full range of motion in all planes about her left shoulder joint elbow wrist and hand. She does note limited cervical range of motion in all planes secondary to pain. 5/5 motor strength in all muscle groups of the left upper extremity in comparison to the right. She has a mildly positive Spurling's sign. Brachioradialis reflex intact. Sensation: Grossly intact to light touch throughout the left upper extremity. Vascular: 2+ radial pulse.     Labs reviewed:  Recent Labs     03/21/21  0715 03/22/21  0539   WBC 11.4* 9.4   HGB 13.1 12.1   HCT 42.9 38.2    211     Recent Labs     03/21/21  0715 03/22/21  0539    138   K 3.8 3.6    105   CO2 21 26   BUN 22* 24*   CREATININE 1.1 1.1   GLUCOSE 93 94   CALCIUM 9.3 8.8     Recent Labs     03/21/21  0715   INR 0.97   PROTIME 11.2       Lab Results   Component Value Date    COLORU YELLOW 11/27/2017    CLARITYU Clear 11/27/2017    PHUR 7.0 11/27/2017    GLUCOSEU Negative 11/27/2017    BLOODU Negative 11/27/2017    LEUKOCYTESUR Negative 11/27/2017    NITRITE 0 05/19/2014    BILIRUBINUR Negative 11/27/2017    UROBILINOGEN 0.2 11/27/2017       Imaging:  NM MYOCARDIAL SPECT REST EXERCISE OR RX   Final Result      CT CHEST PULMONARY EMBOLISM W CONTRAST   Final Result   No findings to suggest large central pulmonary embolism. Patchy ground-glass opacity at the lower lobes, likely atelectasis in the   absence of clinical signs or symptoms of pneumonitis. Hepatic steatosis. Nonobstructing left nephrolithiasis. XR CHEST PORTABLE   Final Result   No acute cardiopulmonary disease. IMPRESSION:  Left-sided cervical radiculitis  Principal Problem:    Chest pain  Active Problems:    Hypertension    Hyperlipidemia  Resolved Problems:    * No resolved hospital problems. *      RECOMMENDATIONS:  Recommend conservative treatment. No indication for any emergent imaging studies. Had lengthy discussion with the patient regarding outpatient management for her symptoms now that a cardiac etiology seems to have been ruled out and her chest pain has resolved. - Pain control: robaxin and toradol - recommend transitioning to oral steroids with a taper for discharge. - DVT prophylaxis: per primary team  - Outpatient PT as ordered already by PCP  - Dispo: ok to d/c from our end. Follow-up with Dr. Faisal Jorge as an outpatient to discuss possible injections if she fails 4-6 weeks of PT, activity modification and medication management. Patient denies tobacco use. I have reviewed imaging and plan with Dr. Hyun Huynh.         Cyntha Snellen, APRN-CNP  3/23/2021  11:15 AM

## 2021-03-23 NOTE — PROGRESS NOTES
Data- discharge order received, pt verbalized agreement to discharge, disposition to previous residence, no needs for HHC/DME. Action- discharge instructions prepared and given to pt, pt verbalized understanding. Medication information packet given r/t NEW and/or CHANGED prescriptions emphasizing name/purpose/side effects, pt verbalized understanding. Discharge instruction summary: Diet- cardiac, Activity- up as tolerated, Primary Care Physician as follows: Hima Feldman -[phone] f/u appointment in one week,  prescription medications filled at pharmacy of choice. Response- Pt belongings gathered, IV removed. Disposition is home (no HHC/DME needs), transported with belongings. no complications.

## 2021-03-25 ENCOUNTER — TELEPHONE (OUTPATIENT)
Dept: INTERNAL MEDICINE CLINIC | Age: 51
End: 2021-03-25

## 2021-03-25 NOTE — TELEPHONE ENCOUNTER
[facility] Trans[facility] Initial Follow Up Call    Outreach made within 2 business days of discharge: Yes    Patient: Tayo Funk Patient : 1970   MRN: [id number]  Reason for Admission: There are no discharge diagnoses documented for the most recent discharge. Discharge Date: 3/23/21       Spoke with: Left voicemail for patient to call office with questions, concerns and to schedule HFU.     Discharge department/facility: [facility]    Scheduled appointment with PCP within 7-14 days    Follow Up  Future Appointments   Date Time Provider Emperatriz Leon   3/29/2021  9:45 AM Eufemia Sim PT [facility] PT [facility]an LOZOYA   2021 11:20 AM [facility] FAIR FLU/RED CLIN, MODERNA 28 DAY SECOND DOSE [facility] FLU [facility]   2021  9:40 AM Carmen Mello MD FF NEURO [facility]   2021  9:00 AM MD ZARI Linares [facility]       Asher Dominguez MA

## 2021-03-29 ENCOUNTER — HOSPITAL ENCOUNTER (OUTPATIENT)
Dept: PHYSICAL THERAPY | Age: 51
Setting detail: THERAPIES SERIES
Discharge: HOME | End: 2021-03-29
Payer: COMMERCIAL

## 2021-03-29 PROCEDURE — 97530 THERAPEUTIC ACTIVITIES: CPT

## 2021-03-29 PROCEDURE — 97161 PT EVAL LOW COMPLEX 20 MIN: CPT

## 2021-03-29 PROCEDURE — 97110 THERAPEUTIC EXERCISES: CPT

## 2021-03-29 NOTE — PLAN OF CARE
Contacted MD who prescribed steroid that helped and has been taking tylenol and muscle relaxant that help a little. Taking a higher dose of step down steroid that seems to be helping a little -received this dose pack while in hospital after being admitted for SOB, chest pain and increased LUE pain. Had heart scans and testing, all negative, no heart issues other than prior HTN. Pain has been getting better since returning home, still has difficulty turning head to right, sleeping is difficult unless she takes her medications, looking up increases LUE pain, sidebending doesn't change pian. If she lifts LUE OH w/R hand is able to alleviate pain. Sleeping on R side w/pillows helps. 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 Sits at desk w/high chair, uses split keyboard that results in increased [address] IR bilaterally    Today is first day back to work     Living Status/Prior Level of Function: Prior to this injury / incident, pt was independent with ADLs and IADLs, no prior hx of neck or shoulder pain. Has been working from home since prior to COVID-19 restrictions, can work 12+ hours daily without issues.     OBJECTIVE:   Palpation: TTP at L supraspinatus tendon, teres minor muscle belly, long-head of biceps tendon;    Slight increase in cervical spine musculature tension   L rotation of mid cervical spine    Functional Mobility/Transfers: IND    Posture: guarding of LUE;  Slight forward rounded & elevated shoulders -ea increased w/LUE, forward head posture;     Gait: (include devices/WB status): WNL       CERV ROM     Cervical Flexion WNL    Cervical Extension WFL*    Cervical SB WFL    Cervical rotation Min limited B    *incr pain     ROM Left Right   Shoulder Flex 159 deg    Shoulder Abd     Shoulder ER     Shoulder IR               Strength / Myotomes Left Right   Cervical Flexion (C1-2)     Cervical Side-bending (C3)     Shoulder Shrug (C4)     Shoulder Flex 4- 4+   Shoulder Scap     Shoulder Abduction (C5)     Shoulder ER 3+ 4   Shoulder IR     Biceps (C6)     Triceps (C7)     Wrist Extension (C6)     Wrist Flexion (C7)           Thumb Abduction (C8)     Finger Abduction (T1)       Lower extremity myotomes:   [x]Normal     []Abnormal     Joint mobility: cervical spine   []Normal    [x]Hypo   []Hyper    Orthopaedic Special Tests   Positive  Negative  NT Comments    Hautard's        Rhomberg       Sharps-Lester       Cervical Torsion / Body Rotation        C2 Kick       Modified Shear       Compression       Distraction                                      [x] Patient history, allergies, meds reviewed. Medical chart reviewed. See intake form. Review Of Systems (ROS):  [x]Performed Review of systems (Integumentary, CardioPulmonary, Neurological) by intake and observation. Intake form has been scanned into medical record. Patient has been instructed to contact their primary care physician regarding ROS issues if not already being addressed at this time.       Co-morbidities/Complexities (which will affect course of rehabilitation):    []None        []Hx of COVID   Arthritic conditions   []Rheumatoid arthritis (M05.9)  []Osteoarthritis (M19.91)  []Gout   Cardiovascular conditions   [x]Hypertension (I10)  [x]Hyperlipidemia (E78.5)  []Angina pectoris (I20)  []Atherosclerosis (I70)  []Pacemaker  []Hx of CABG/stent/  cardiac surgeries   Musculoskeletal conditions   []Disc pathology   []Congenital spine pathologies   []Osteoporosis (M81.8)  []Osteopenia (M85.8)  []Scoliosis       Endocrine conditions   []Hypothyroid (E03.9)  []Hyperthyroid Gastrointestinal conditions   []Constipation (R31.13)   Metabolic conditions   []Morbid obesity (E66.01)  []Diabetes type 1(E10.65) or 2 (E11.65)   [x]Neuropathy (G60.9)     Cardio/Pulmonary conditions   [x]Asthma (J45)  []Coughing   []COPD (J44.9)  []CHF  []A-fib   Psychological Disorders  []Anxiety (F41.9)  []Depression (F32.9)   []Other:   Developmental Disorders  []Autism (F84.0)  []CP (G80)  []Down Syndrome (Q90.9)  []Developmental delay     Neurological conditions  []Prior Stroke (I69.30)  []Parkinson's (G20)  []Encephalopathy (G93.40)  []MS (G35)  []Post-polio (G14)  []SCI  []TBI  []ALS Other conditions  []Fibromyalgia (M79.7)  []Vertigo  []Syncope  []Kidney Failure  []Cancer      []currently undergoing                treatment  []Pregnancy  []Incontinence   Prior surgeries  []involved limb  []previous spinal surgery  [] section birth  []hysterectomy  []bowel / bladder surgery  []other relevant surgeries   [x]Other:  Pseudotumor cerebri -no stent placed            Barriers to/and or personal factors that will affect rehab potential:              [x]Age  []Sex   []Smoker              []Motivation/Lack of Motivation                        []Co-Morbidities              []Cognitive Function, education/learning barriers              []Environmental, home barriers              [x]profession/work barriers  []past PT/medical experience  []other:    Falls Risk Assessment (30 days):   [x] Falls Risk assessed and no intervention required.   [] Falls Risk assessed and Patient requires intervention due to being higher risk   TUG score (>12s at risk):     [] Falls education provided, including         ASSESSMENT: Patient is a 49 yo female who complains of LUE pain in upper shoulder and upper arm that's affecting her work and sleeping habits primarily, as well as IADLs with LUE. Pain recently sent pt to ER where she had work up for any cardiac issues, all negative and steroid step down dose pack was prescribed for pt, symptoms have been improving since then but pt is worried pain will increase again once steroid is finished. Patient presents with guarded LUE posture but otherwise good- cervicothoracic posture and weakness of deep cervical flexors and scapular musculature. Cervical extension increases LUE symptoms and, upon further inspection, left rotation of mid cervical spine exists, most likely contributing in nerve compression and upper LUE symptoms. Patient responded well to manual R rotation to C5 and manual cervical distraction, reporting symptom free and should continue to respond well to PT services to improve cervicothoracic spine and scapular musculature to further reduce deficits.      Functional Impairments:     [x]Noted cervical/thoracic/GHJ joint hypomobility   []Noted cervical/thoracic/GHJ joint hypermobility   [x]Decreased cervical/UE functional ROM   []Noted Headache pain aggravated by neck movements with/without dizziness   []Abnormal reflexes/sensation/myotomal/dermatomal deficits   [x]Decreased DCF control or ability to hold head up   [x]Decreased RC/scapular/core strength and neuromuscular control    [x]Decreased UE functional strength   []other:      Functional Activity Limitations (from functional questionnaire and intake)   []Reduced ability to tolerate prolonged functional positions   []Reduced ability or difficulty with changes of positions or transfers between positions   [x]Reduced ability to maintain good posture and demonstrate good body mechanics with sitting, bending, and lifting   [x] Reduced ability or tolerance with driving and/or computer work   [x]Reduced ability to perform lifting, reaching, carrying tasks   []Reduced ability to concentrate   [x]Reduced ability to sleep    []Reduced ability to tolerate any impact through UE or spine   []Reduced ability to ambulate prolonged functional periods/distances   []other:    Participation Restrictions   []Reduced participation in self care activities   [x]Reduced participation in home management activities   [x]Reduced participation in work activities   []Reduced participation in social activities. []Reduced participation in sport/recreational activities. Classification/Subgrouping:   []signs/symptoms consistent with neck pain with mobility deficits     []signs/symptoms consistent with neck pain with movement coordinated impairments    [x]signs/symptoms consistent with neck pain with radiating pain    []signs/symptoms consistent with neck pain with headaches (cervicogenic)    [x]Signs/symptoms consistent with nerve root involvement including myotome & dermatome dysfunction   []sign/symptoms consistent with facet dysfunction of cervical and thoracic spine    []signs/symptoms consistent suggesting central cord compression/UMN syndromes   []signs/symptoms consistent with discogenic cervical pain   []signs/symptoms consistent with rib dysfunction   [x]signs/symptoms consistent with postural dysfunction   []signs/symptoms consistent with shoulder pathology    []signs/symptoms consistent with post-surgical status including decreased ROM, strength and function.    []signs/symptoms consistent with pathology which may benefit from Dry Needling   []signs/symptoms which may limit the use of advanced manual therapy techniques: (Hypertension, recent trauma, intolerance to end range positions, prior TIA, visual issues, UE myotomes loss )     Prognosis/Rehab Potential:      []Excellent   [x]Good    []Fair   []Poor    Tolerance of evaluation/treatment:    []Excellent   [x]Good    []Fair   []Poor    Physical Therapy Evaluation Complexity Justification  [x] A history of present problem with:  [] no personal factors and/or comorbidities that impact the plan of care;  [x]1-2 personal factors and/or comorbidities that impact the plan of care  []3 personal factors and/or comorbidities that impact the plan of care  [x] An examination of body systems using standardized tests and measures addressing any of the following: body structures and functions (impairments), activity limitations, and/or participation restrictions;:  [x] a total of 1-2 or more elements   [] a total of 3 or more elements   [] a total of 4 or more elements   [x] A clinical presentation with:  [x] stable and/or uncomplicated characteristics   [] evolving clinical presentation with changing characteristics  [] unstable and unpredictable characteristics;   [x] Clinical decision making of [x] low, [] moderate, [] high complexity using standardized patient assessment instrument and/or measurable assessment of functional outcome. [x] EVAL (LOW) 68016 (typically 20 minutes face-to-face)  [] EVAL (MOD) 76298 (typically 30 minutes face-to-face)  [] EVAL (HIGH) 03321 (typically 45 minutes face-to-face)  [] RE-EVAL     PLAN:   Frequency/Duration:  2 days per week for 6 Weeks:  Interventions:  [x]  Therapeutic exercise including: strength training, ROM, for cervical spine,scapula, core and Upper extremity, including postural re-education. [x]  NMR activation and proprioception for Deep cervical flexors, periscapular and RC muscles and Core, including postural re-education. [x]  Manual therapy as indicated for C/T spine, ribs, Soft tissue to include: Dry Needling/IASTM, STM, PROM, Gr I-IV mobilizations, manipulation. [x] Modalities as needed that may include: thermal agents, E-stim, Biofeedback, US, iontophoresis as indicated  [x] Patient education on joint protection, postural re-education, activity modification, progression of HEP. HEP instruction: Written HEP instructions provided and reviewed. Access Code: [email or web address]  URL: [email or web address]  Date: 03/29/2021  Prepared by: Quentin Marroquin - 2 x daily - 7 x weekly - 10 reps  Seated Scapular Retraction - 2 x daily - 7 x weekly - 10 reps  Supine Shoulder Flexion Extension Full Range AROM - 2 x daily - 7 x weekly - 10 reps  Shoulder External Rotation and Scapular Retraction with Resistance - 2 x daily - 7 x weekly - 10 reps      GOALS:  Patient stated goal: alleviate pain  [] Progressing: [] Met: [] Not Met: [] Adjusted    Therapist goals for Patient:   Short Term Goals: To be achieved in: 2 weeks  1. Independent in HEP and progression per patient tolerance, in order to prevent re-injury. [] Progressing: [] Met: [] Not Met: [] Adjusted  2. Patient will have a decrease in pain to facilitate improvement in movement, function, and ADLs as indicated by Functional Deficits. [] Progressing: [] Met: [] Not Met: [] Adjusted    Long Term Goals: To be achieved in: 6 weeks  1. Disability index score of 10% or less for the NDI to assist with reaching prior level of function. [] Progressing: [] Met: [] Not Met: [] Adjusted  2. Patient will perform pain-free & maintain AROM of cervical/thoracic spine  to allow for proper joint functioning as indicated by patients Functional Deficits. [] Progressing: [] Met: [] Not Met: [] Adjusted  3. Patient will demonstrate an increase in postural awareness and control and activation of deep cervical stabilizers to allow for proper functional mobility as indicated by patients Functional Deficits. [] Progressing: [] Met: [] Not Met: [] Adjusted  4. Patient will return to functional activities including sleeping without increased symptoms or restriction. [] Progressing: [] Met: [] Not Met: [] Adjusted  5. Patient to demonstrate L shoulder complex strength of 4+/5 throughout in order to reach [place] & lift objects pain-free.   [] Progressing: [] Met: [] Not Met: [] Adjusted     Electronically signed by:  Bibi Narayan PT

## 2021-03-30 NOTE — FLOWSHEET NOTE
Karan Lutz  Phone: [phone]   Fax: [phone]    Physical Therapy Treatment Note/ Progress Report:     Date:  3/30/2021    Patient Name:  Joshua Butterfield    :  1970  MRN: [id number]  Restrictions/Precautions:    Medical/Treatment Diagnosis Information:  · Diagnosis: M54.12 (ICD-10-CM) - Cervical radicular pain  · Treatment Diagnosis: Misalinged cervicothoracic spine with decreased stability, Pain w/cervical extension, Decreased scapular stability  Insurance/Certification information:  PT Insurance Information: [employer]. 120 combined visits  Physician Information:  Referring Practitioner: Yesika Cook MD  Plan of care signed (Y/N): []  Yes [x]  No     Date of Patient follow up with Physician:      Progress Report: []  Yes  [x]  No     Date Range for reporting period:  Beginning: 3/29/21  Ending:     Progress report due (10 Rx/or 30 days whichever is less): visit #10 or 54 (date)     Recertification due (POC duration/ or 90 days whichever is less): visit # or 21(date)     Visit # Insurance Allowable Auth required?  Date Range    120 combined visits []  Yes  [x]  No pcy     Latex Allergy:  [x]NO      []YES  Preferred Language for Healthcare:   [x]English       []other:    Functional Scale:       Date assessed:  NDI: raw score = 17; dysfunction = 34%   3/29    Pain level:  4-7/10     SUBJECTIVE:  See eval    OBJECTIVE: See eval   Observation:    Test measurements:      RESTRICTIONS/PRECAUTIONS:  HTN, pseudotumor cerebri    Exercises/Interventions:   Therapeutic Exercise (11883) Resistance / level Sets/sec Reps Notes   UBE       Pulley's       Cerv & scap stabs at wall       Resistive bands  Seated B GH ER w/scap retract   orange    10    Seated stretches                                   Therapeutic Activities (14043)       3/29:  Discussed improved cervicothoracic spine posture, especially when seated for work and driving. NMR re-education (76973)       Mat table  Supine  Chin tucks  scap retract  Shoulder flexion       15  10  5 L    Prone Hughston's                             Manual Intervention (11693)       Cerv mobs R rot mobs to C5  2 mins  -improved symptoms   Thoracic mobs/manip       CT manip       Rib mobilizations       STM       Cervical distraction  2 mins  -improved symptoms       Modalities:     Patient education:  3/29:  -pt educated on diagnosis, prognosis and expectations for rehab  -all pt questions were answered    Home Exercise Program:  Access Code: [email or web address]  URL: [email or web address]  Date: 03/29/2021  Prepared by: Julee Moder     Exercises  Supine Chin Tuck - 2 x daily - 7 x weekly - 10 reps  Seated Scapular Retraction - 2 x daily - 7 x weekly - 10 reps  Supine Shoulder Flexion Extension Full Range AROM - 2 x daily - 7 x weekly - 10 reps  Shoulder External Rotation and Scapular Retraction with Resistance - 2 x daily - 7 x weekly - 10 reps    Therapeutic Exercise and NMR EXR  [x] (98059) Provided verbal/tactile cueing for activities related to strengthening, flexibility, endurance, ROM  for improvements in cervical, postural, scapular, scapulothoracic and UE control with self care, reaching, carrying, lifting, house/yardwork, driving/computer work.    [] (72693) Provided verbal/tactile cueing for activities related to improving balance, coordination, kinesthetic sense, posture, motor skill, proprioception  to assist with cervical, scapular, scapulothoracic and UE control with self care, reaching, carrying, lifting, house/yardwork, driving/computer work.  [] (01343) Therapist is in constant attendance of 2 or more patients providing skilled therapy interventions, but not providing any significant amount of measurable one-on-one time to either patient, for improvements in cervical, scapular, scapulothoracic and UE control with self care, reaching, carrying, lifting, house/yardwork, driving, computer work. Therapeutic Activities:    [x] (10102 or 00380) Provided verbal/tactile cueing for activities related to improving balance, coordination, kinesthetic sense, posture, motor skill, proprioception and motor activation to allow for proper function of cervical, scapular, scapulothoracic and UE control with self care, carrying, lifting, driving/computer work.      Home Exercise Program:    [x] (03452) Reviewed/Progressed HEP activities related to strengthening, flexibility, endurance, ROM of cervical, scapular, scapulothoracic and UE control with self care, reaching, carrying, lifting, house/yardwork, driving/computer work  [] (80350) Reviewed/Progressed HEP activities related to improving balance, coordination, kinesthetic sense, posture, motor skill, proprioception of cervical, scapular, scapulothoracic and UE control with self care, reaching, carrying, lifting, house/yardwork, driving/computer work      Manual Treatments:  PROM / STM / Oscillations-Mobs:  G-I, II, III, IV (PA's, Inf., Post.)  [] (29135) Provided manual therapy to mobilize soft tissue/joints of cervical/CT, scapular GHJ and UE for the purpose of decreasing headache, modulating pain, promoting relaxation,  increasing ROM, reducing/eliminating soft tissue swelling/inflammation/restriction, improving soft tissue extensibility and allowing for proper ROM for normal function with self care, reaching, carrying, lifting, house/yardwork, driving/computer work    Charges:  Timed Code Treatment Minutes: 32   Total Treatment Minutes: 63       [x] EVAL - LOW (83967)   [] EVAL - MOD (03667)  [] EVAL - HIGH (25282)  [] RE-EVAL (98488)  [x] EO(12106) x  1     [] Ionto  [] NMR (21399) x       [] Vaso  [] Manual (70913) x       [] Ultrasound  [x] TA x  1      [] Mech Traction (78921)  [] Aquatic Therapy x     [] ES (un) (71899):   [] Home Management Training x  [] ES(attended) (83291)   [] Dry Needling 1-2 muscles (68279):  [] Dry Needling 3+ muscles (293583  [] Group:      [] Other:     GOALS:  Patient stated goal: alleviate pain  [] Progressing: [] Met: [] Not Met: [] Adjusted    Therapist goals for Patient:   Short Term Goals: To be achieved in: 2 weeks  1. Independent in HEP and progression per patient tolerance, in order to prevent re-injury. [] Progressing: [] Met: [] Not Met: [] Adjusted  2. Patient will have a decrease in pain to facilitate improvement in movement, function, and ADLs as indicated by Functional Deficits. [] Progressing: [] Met: [] Not Met: [] Adjusted    Long Term Goals: To be achieved in: 6 weeks  1. Disability index score of 10% or less for the NDI to assist with reaching prior level of function. [] Progressing: [] Met: [] Not Met: [] Adjusted  2. Patient will perform pain-free & maintain AROM of cervical/thoracic spine  to allow for proper joint functioning as indicated by patients Functional Deficits. [] Progressing: [] Met: [] Not Met: [] Adjusted  3. Patient will demonstrate an increase in postural awareness and control and activation of deep cervical stabilizers to allow for proper functional mobility as indicated by patients Functional Deficits. [] Progressing: [] Met: [] Not Met: [] Adjusted  4. Patient will return to functional activities including sleeping without increased symptoms or restriction. [] Progressing: [] Met: [] Not Met: [] Adjusted  5. Patient to demonstrate L shoulder complex strength of 4+/5 throughout in order to reach [place] & lift objects pain-free. [] Progressing: [] Met: [] Not Met: [] Adjusted       Overall Progression Towards Functional goals/ Treatment Progress Update:  [] Patient is progressing as expected towards functional goals listed. [] Progression is slowed due to complexities/Impairments listed. [] Progression has been slowed due to co-morbidities.   [x] Plan just implemented, too soon to assess goals progression <30days   [] Goals require adjustment due to lack of progress  [] Patient is not progressing as expected and requires additional follow up with physician  [] Other    Persisting Functional Limitations/Impairments:  []Sitting []Standing   []Walking []Squatting/bending    []Stairs []ADL's    []Transfers []Reaching  []Housework []Lifting  []Driving []Job related tasks  []Sports/Recreation  []Sleeping  []Other:    ASSESSMENT:  See eval    Treatment/Activity Tolerance:  [x] Patient able to complete tx  [] Patient limited by fatique  [] Patient limited by pain   [] Patient limited by other medical complications  [] Other:     Prognosis: [x] Good [] Fair  [] Poor    Patient Requires Follow-up: [x] Yes  [] No    PLAN: See pb. PT 2x / week for 6 weeks. [] Continue per plan of care [] Alter current plan (see comments)  [x] Plan of care initiated [] Hold pending MD visit [] Discharge    Electronically signed by: Bibi Narayan PT       Note: If patient does not return for scheduled/ recommended follow up visits, this note will serve as a discharge from care along with most recent update on progress.

## 2021-03-31 ENCOUNTER — HOSPITAL ENCOUNTER (OUTPATIENT)
Dept: PHYSICAL THERAPY | Age: 51
Setting detail: THERAPIES SERIES
Discharge: HOME | End: 2021-03-31
Payer: COMMERCIAL

## 2021-03-31 PROCEDURE — 97112 NEUROMUSCULAR REEDUCATION: CPT

## 2021-03-31 PROCEDURE — 97110 THERAPEUTIC EXERCISES: CPT

## 2021-03-31 PROCEDURE — 97140 MANUAL THERAPY 1/> REGIONS: CPT

## 2021-03-31 NOTE — FLOWSHEET NOTE
NeldaKaran  Phone: [phone]   Fax: [phone]    Physical Therapy Treatment Note/ Progress Report:     Date:  3/31/2021    Patient Name:  Padmini Marcelino    :  1970  MRN: [id number]  Restrictions/Precautions:    Medical/Treatment Diagnosis Information:  · Diagnosis: M54.12 (ICD-10-CM) - Cervical radicular pain  · Treatment Diagnosis: Misalinged cervicothoracic spine with decreased stability, Pain w/cervical extension, Decreased scapular stability  Insurance/Certification information:  PT Insurance Information: A[employer]. 120 combined visits  Physician Information:  Referring Practitioner: Katiuska Kat MD  Plan of care signed (Y/N): []  Yes [x]  No     Date of Patient follow up with Physician:      Progress Report: []  Yes  [x]  No     Date Range for reporting period:  Beginning: 3/29/21  Ending:     Progress report due (10 Rx/or 30 days whichever is less): visit #10 or  (date)     Recertification due (POC duration/ or 90 days whichever is less): visit # or 21(date)     Visit # Insurance Allowable Auth required? Date Range    120 combined visits []  Yes  [x]  No pcy     Latex Allergy:  [x]NO      []YES  Preferred Language for Healthcare:   [x]English       []other:    Functional Scale:       Date assessed:  NDI: raw score = 17; dysfunction = 34%   3/29    Pain level:  2/10     SUBJECTIVE:     Doing a little better this am than at eval, is able to sleep through the night now, doing HEP prior to going to sleep and got rid of some of her pillows and has helped. Throbbing is gone too, just aching now. Pain is located at anterior L [address] joint now.       OBJECTIVE: See eval   Observation:    Test measurements:      RESTRICTIONS/PRECAUTIONS:  HTN, pseudotumor cerebri    Exercises/Interventions:   Therapeutic Exercise (24493) Resistance / level Sets/sec Reps Notes   UBE  -fwd / bwd  2'/2'     Pulley's  -flex  2' scapulothoracic and UE control with self care, reaching, carrying, lifting, house/yardwork, driving/computer work.  [] (91580) Therapist is in constant attendance of 2 or more patients providing skilled therapy interventions, but not providing any significant amount of measurable one-on-one time to either patient, for improvements in cervical, scapular, scapulothoracic and UE control with self care, reaching, carrying, lifting, house/yardwork, driving, computer work. Therapeutic Activities:    [x] (46287 or 07562) Provided verbal/tactile cueing for activities related to improving balance, coordination, kinesthetic sense, posture, motor skill, proprioception and motor activation to allow for proper function of cervical, scapular, scapulothoracic and UE control with self care, carrying, lifting, driving/computer work.      Home Exercise Program:    [x] (30265) Reviewed/Progressed HEP activities related to strengthening, flexibility, endurance, ROM of cervical, scapular, scapulothoracic and UE control with self care, reaching, carrying, lifting, house/yardwork, driving/computer work  [] (45539) Reviewed/Progressed HEP activities related to improving balance, coordination, kinesthetic sense, posture, motor skill, proprioception of cervical, scapular, scapulothoracic and UE control with self care, reaching, carrying, lifting, house/yardwork, driving/computer work      Manual Treatments:  PROM / STM / Oscillations-Mobs:  G-I, II, III, IV (PA's, Inf., Post.)  [x] (09863) Provided manual therapy to mobilize soft tissue/joints of cervical/CT, scapular GHJ and UE for the purpose of decreasing headache, modulating pain, promoting relaxation,  increasing ROM, reducing/eliminating soft tissue swelling/inflammation/restriction, improving soft tissue extensibility and allowing for proper ROM for normal function with self care, reaching, carrying, lifting, house/yardwork, driving/computer work    Charges:  Timed Code Treatment Minutes: Total Treatment Minutes:        [] EVAL - LOW (10912)   [] EVAL - MOD (72070)  [] EVAL - HIGH (27235)  [] RE-EVAL (16955)  [x] SN(53862) x  1     [] Ionto  [x] NMR (41742) x  1     [] Vaso  [x] Manual (43512) x  1     [] Ultrasound  [] TA x        [] Mech Traction (60043)  [] Aquatic Therapy x     [] ES (un) (00369):   [] Home Management Training x  [] ES(attended) (31025)   [] Dry Needling 1-2 muscles (85289):  [] Dry Needling 3+ muscles (710547  [] Group:      [] Other:     GOALS:  Patient stated goal: alleviate pain  [] Progressing: [] Met: [] Not Met: [] Adjusted    Therapist goals for Patient:   Short Term Goals: To be achieved in: 2 weeks  1. Independent in HEP and progression per patient tolerance, in order to prevent re-injury. [] Progressing: [] Met: [] Not Met: [] Adjusted  2. Patient will have a decrease in pain to facilitate improvement in movement, function, and ADLs as indicated by Functional Deficits. [] Progressing: [] Met: [] Not Met: [] Adjusted    Long Term Goals: To be achieved in: 6 weeks  1. Disability index score of 10% or less for the NDI to assist with reaching prior level of function. [] Progressing: [] Met: [] Not Met: [] Adjusted  2. Patient will perform pain-free & maintain AROM of cervical/thoracic spine  to allow for proper joint functioning as indicated by patients Functional Deficits. [] Progressing: [] Met: [] Not Met: [] Adjusted  3. Patient will demonstrate an increase in postural awareness and control and activation of deep cervical stabilizers to allow for proper functional mobility as indicated by patients Functional Deficits. [] Progressing: [] Met: [] Not Met: [] Adjusted  4. Patient will return to functional activities including sleeping without increased symptoms or restriction. [] Progressing: [] Met: [] Not Met: [] Adjusted  5. Patient to demonstrate L shoulder complex strength of 4+/5 throughout in order to reach [place] & lift objects pain-free.   []

## 2021-04-06 ENCOUNTER — HOSPITAL ENCOUNTER (OUTPATIENT)
Dept: PHYSICAL THERAPY | Age: 51
Setting detail: THERAPIES SERIES
Discharge: HOME | End: 2021-04-06
Payer: COMMERCIAL

## 2021-04-06 PROCEDURE — 97012 MECHANICAL TRACTION THERAPY: CPT

## 2021-04-06 PROCEDURE — 97110 THERAPEUTIC EXERCISES: CPT

## 2021-04-06 PROCEDURE — 97140 MANUAL THERAPY 1/> REGIONS: CPT

## 2021-04-06 PROCEDURE — 97530 THERAPEUTIC ACTIVITIES: CPT

## 2021-04-06 NOTE — FLOWSHEET NOTE
Karan Lutz  Phone: [phone]   Fax: [phone]    Physical Therapy Treatment Note/ Progress Report:     Date:  2021    Patient Name:  Davis Rudolph    :  1970  MRN: [id number]  Restrictions/Precautions:    Medical/Treatment Diagnosis Information:  · Diagnosis: M54.12 (ICD-10-CM) - Cervical radicular pain  · Treatment Diagnosis: Misalinged cervicothoracic spine with decreased stability, Pain w/cervical extension, Decreased scapular stability  Insurance/Certification information:  PT Insurance Information: A[employer]. 120 combined visits  Physician Information:  Referring Practitioner: Donita Wheeler MD  Plan of care signed (Y/N): [x]  Yes []  No     Date of Patient follow up with Physician:      Progress Report: []  Yes  [x]  No     Date Range for reporting period:  Beginning: 3/29/21  Ending:     Progress report due (10 Rx/or 30 days whichever is less): visit #10 or 3/59/63 (date)     Recertification due (POC duration/ or 90 days whichever is less): visit #/ or 21(date)     Visit # Insurance Allowable Auth required? Date Range   3/12 120 combined visits []  Yes  [x]  No pcy     Latex Allergy:  [x]NO      []YES  Preferred Language for Healthcare:   [x]English       []other:    Functional Scale:       Date assessed:  NDI: raw score = 17; dysfunction = 34%   3/29    Pain level:  3/10     SUBJECTIVE:     Reports pain is less intense, just more irritating. Pain is traveling below elbow at times, centralized at elbow and posterior L shoulder, still has pain in biceps and anterior GH joint as well. No N&T. Does feel that LUE gets more tired while working. Reports step down steroid dose pack ended over the weekend.     OBJECTIVE: See eval   Observation:    Test measurements:      RESTRICTIONS/PRECAUTIONS:  HTN, pseudotumor cerebri    Exercises/Interventions:   Therapeutic Exercise (91478) Resistance / level Sets/sec Reps Notes   UBE  -fwd / bwd  2'/2'     Pulley's  -flex  4'     Cerv & scap stabs at wall  Chin tucks  Cervical rotation  W's  W to Y's        1 ea   15    5, 10  15    Resistive bands  Seated B GH ER w/scap retract  Mid row  [address] ext  [address] ER   Deaf [place]  Lime  Lime  Lime           Seated stretches                                   Therapeutic Activities (20035)       3/29:  Discussed improved cervicothoracic spine posture, especially when seated for work and driving. NMR re-education (21331)       Mat table  Supine  Chin tucks  scap retract  Shoulder flexion           Prone Hughston's   -T's  -Y's                              Manual Intervention (22594)       Cerv mobs R rot mobs to C5  P/A mobs grade II-III C4-C7  2 mins  4 mins     ULNT:  Median glides  -elbow flex & ext  -wrist flex & ext  Radial glides  Ulnar glides      2x2 mins  2 mins  1 min  1 min     CT manip       Rib mobilizations       STM B cervical paraspinals, B upper trap, B SOR  5 mins     Cervical distraction  5\" hold x4 mins         Modalities:   4/6:  Mechanical cervical traction 12#/8#, 30\" on / 10\" off, x10 mins w/bolsters. Patient was given panic button as well as instructed how to use it if experiencing pain. Patient was also given bell to call if anything is needed. Patient education:  3/29:  -pt educated on diagnosis, prognosis and expectations for rehab  -all pt questions were answered    Home Exercise Program:  Access Code: [email or web address]  URL: [email or web address]  Date: 03/29/2021  Prepared by: Fidelina Home     Exercises  Supine Chin Tuck - 2 x daily - 7 x weekly - 10 reps  Seated Scapular Retraction - 2 x daily - 7 x weekly - 10 reps  Supine Shoulder Flexion Extension Full Range AROM - 2 x daily - 7 x weekly - 10 reps  Shoulder External Rotation and Scapular Retraction with Resistance - 2 x daily - 7 x weekly - 10 reps    Therapeutic Exercise and NMR EXR  [x] (52206) Provided verbal/tactile cueing for activities related to strengthening, flexibility, endurance, ROM  for improvements in cervical, postural, scapular, scapulothoracic and UE control with self care, reaching, carrying, lifting, house/yardwork, driving/computer work.    [] (16369) Provided verbal/tactile cueing for activities related to improving balance, coordination, kinesthetic sense, posture, motor skill, proprioception  to assist with cervical, scapular, scapulothoracic and UE control with self care, reaching, carrying, lifting, house/yardwork, driving/computer work.  [] (11639) Therapist is in constant attendance of 2 or more patients providing skilled therapy interventions, but not providing any significant amount of measurable one-on-one time to either patient, for improvements in cervical, scapular, scapulothoracic and UE control with self care, reaching, carrying, lifting, house/yardwork, driving, computer work. Therapeutic Activities:    [x] (00270 or 47785) Provided verbal/tactile cueing for activities related to improving balance, coordination, kinesthetic sense, posture, motor skill, proprioception and motor activation to allow for proper function of cervical, scapular, scapulothoracic and UE control with self care, carrying, lifting, driving/computer work.      Home Exercise Program:    [x] (95704) Reviewed/Progressed HEP activities related to strengthening, flexibility, endurance, ROM of cervical, scapular, scapulothoracic and UE control with self care, reaching, carrying, lifting, house/yardwork, driving/computer work  [] (26656) Reviewed/Progressed HEP activities related to improving balance, coordination, kinesthetic sense, posture, motor skill, proprioception of cervical, scapular, scapulothoracic and UE control with self care, reaching, carrying, lifting, house/yardwork, driving/computer work      Manual Treatments:  PROM / STM / Oscillations-Mobs:  G-I, II, III, IV (PA's, Inf., Post.)  [x] (76745) Provided manual therapy to mobilize soft tissue/joints of cervical/CT, scapular GHJ and UE for the purpose of decreasing headache, modulating pain, promoting relaxation,  increasing ROM, reducing/eliminating soft tissue swelling/inflammation/restriction, improving soft tissue extensibility and allowing for proper ROM for normal function with self care, reaching, carrying, lifting, house/yardwork, driving/computer work    Charges:  Timed Code Treatment Minutes: 42   Total Treatment Minutes: 54       [] EVAL - LOW (87803)   [] EVAL - MOD (95689)  [] EVAL - HIGH (58239)  [] RE-EVAL (53456)  [x] MA(82268) x  1     [] Ionto  [] NMR (45006) x       [] Vaso  [x] Manual (72014) x  1     [] Ultrasound  [x] TA x  1      [x] Mech Traction (51411)  [] Aquatic Therapy x      [] ES (un) (98728):   [] Home Management Training x  [] ES(attended) (33958)   [] Dry Needling 1-2 muscles (73379):  [] Dry Needling 3+ muscles (827950  [] Group:      [] Other:     GOALS:  Patient stated goal: alleviate pain  [] Progressing: [] Met: [] Not Met: [] Adjusted    Therapist goals for Patient:   Short Term Goals: To be achieved in: 2 weeks  1. Independent in HEP and progression per patient tolerance, in order to prevent re-injury. [] Progressing: [] Met: [] Not Met: [] Adjusted  2. Patient will have a decrease in pain to facilitate improvement in movement, function, and ADLs as indicated by Functional Deficits. [] Progressing: [] Met: [] Not Met: [] Adjusted    Long Term Goals: To be achieved in: 6 weeks  1. Disability index score of 10% or less for the NDI to assist with reaching prior level of function. [] Progressing: [] Met: [] Not Met: [] Adjusted  2. Patient will perform pain-free & maintain AROM of cervical/thoracic spine  to allow for proper joint functioning as indicated by patients Functional Deficits. [] Progressing: [] Met: [] Not Met: [] Adjusted  3.  Patient will demonstrate an increase in postural awareness and control and activation of deep Patient limited by other medical complications  [] Other:     Prognosis: [x] Good [] Fair  [] Poor    Patient Requires Follow-up: [x] Yes  [] No    PLAN: See eval. PT 2x / week for 6 weeks. [x] Continue per plan of care [] Alter current plan (see comments)  [] Plan of care initiated [] Hold pending MD visit [] Discharge    Electronically signed by: Nithya Hill PT       Note: If patient does not return for scheduled/ recommended follow up visits, this note will serve as a discharge from care along with most recent update on progress.

## 2021-04-09 ENCOUNTER — HOSPITAL ENCOUNTER (OUTPATIENT)
Dept: PHYSICAL THERAPY | Age: 51
Setting detail: THERAPIES SERIES
Discharge: HOME | End: 2021-04-09
Payer: COMMERCIAL

## 2021-04-09 PROCEDURE — 97140 MANUAL THERAPY 1/> REGIONS: CPT

## 2021-04-09 PROCEDURE — 97530 THERAPEUTIC ACTIVITIES: CPT

## 2021-04-09 PROCEDURE — 97110 THERAPEUTIC EXERCISES: CPT

## 2021-04-09 NOTE — FLOWSHEET NOTE
Karan Lutz  Phone: [phone]   Fax: [phone]    Physical Therapy Treatment Note/ Progress Report:     Date:  2021    Patient Name:  Phyllis Lugo    :  1970  MRN: [id number]  Restrictions/Precautions:    Medical/Treatment Diagnosis Information:  · Diagnosis: M54.12 (ICD-10-CM) - Cervical radicular pain  · Treatment Diagnosis: Misalinged cervicothoracic spine with decreased stability, Pain w/cervical extension, Decreased scapular stability  Insurance/Certification information:  PT Insurance Information: A[employer]. 120 combined visits  Physician Information:  Referring Practitioner: Akanksha Simmons MD  Plan of care signed (Y/N): [x]  Yes []  No     Date of Patient follow up with Physician:      Progress Report: []  Yes  [x]  No     Date Range for reporting period:  Beginning: 3/29/21  Ending:     Progress report due (10 Rx/or 30 days whichever is less): visit #10 or 3/62/86 (date)     Recertification due (POC duration/ or 90 days whichever is less): visit # or 21(date)     Visit # Insurance Allowable Auth required? Date Range    120 combined visits []  Yes  [x]  No pcy     Latex Allergy:  [x]NO      []YES  Preferred Language for Healthcare:   [x]English       []other:    Functional Scale:       Date assessed:  NDI: raw score = 17; dysfunction = 34%   3/29    Pain level:  2/10     SUBJECTIVE:     Driving continues to increase pain but has been feeling a little better since last session. Had massage same day as last treatment, felt great afterwards but pain rapidly returned the following day. Pain is migrating closer to shoulder, driving still causes pain to radiate further.       OBJECTIVE: See eval   Observation:    Test measurements:      RESTRICTIONS/PRECAUTIONS:  HTN, pseudotumor cerebri    Exercises/Interventions:   Therapeutic Exercise (85126) Resistance / level Sets/sec Reps Notes   UBE  -fwd / bwd  2'/2' Pulley's  -flex  3'     Cerv & scap stabs at wall  Chin tucks  Cervical rotation  W's  W to Y's    2\" hold    1 ea   15    10  15    Resistive bands  Seated B GH ER w/scap retract  Mid row  [facility] ext  [facility] ER  Robber pose   Orange  Lime  Lime  Orange   Orange       10 B  15    Seated stretches                                   Therapeutic Activities (76967)       3/29:  Discussed improved cervicothoracic spine posture, especially when seated for work and driving. 4/9:  Discussed improved posture while driving and reducing dependency on LUE as she places hand on top of wheel. 5 mins                                 NMR re-education (12606)       Mat table  Supine  Chin tucks  scap retract  Shoulder flexion    Seated median nerve glides, wrist flex & ext           15 L             -unable to maintain good shld abd & UT compensations developed   Prone Hughston's   -T's  -Y's                              Manual Intervention (65538)       Cerv mobs R rot mobs to C5  P/A mobs grade II-III C4-C7    2 mins     ULNT:  Median glides  -elbow flex & ext  -wrist flex & ext  Radial glides  Ulnar glides           Supine stretches:  Upper trap  SCM    2x30\" B  2x30\" B     SOR  4 mins     STM B cervical paraspinals, B upper trap, B suboccipitals  4 mins     Cervical distraction  5\" hold x2 mins         Modalities:   4/9: deferred traction, pt plans to assess symptoms throughout the day and determine if traction helped or if massage from family helped her symptoms. Will review and determine appropriateness next session. 4/6:  Mechanical cervical traction 12#/8#, 30\" on / 10\" off, x10 mins w/bolsters. Patient was given panic button as well as instructed how to use it if experiencing pain. Patient was also given bell to call if anything is needed.        Patient education:  3/29:  -pt educated on diagnosis, prognosis and expectations for rehab  -all pt questions were answered    Home Exercise Program:  Access Code: [email or web address]  URL: [email or web address]  Date: 03/29/2021  Prepared by: Emeli Strong     Exercises  Supine Chin Tuck - 2 x daily - 7 x weekly - 10 reps  Seated Scapular Retraction - 2 x daily - 7 x weekly - 10 reps  Supine Shoulder Flexion Extension Full Range AROM - 2 x daily - 7 x weekly - 10 reps  Shoulder External Rotation and Scapular Retraction with Resistance - 2 x daily - 7 x weekly - 10 reps    Therapeutic Exercise and NMR EXR  [x] (41618) Provided verbal/tactile cueing for activities related to strengthening, flexibility, endurance, ROM  for improvements in cervical, postural, scapular, scapulothoracic and UE control with self care, reaching, carrying, lifting, house/yardwork, driving/computer work.    [] (96425) Provided verbal/tactile cueing for activities related to improving balance, coordination, kinesthetic sense, posture, motor skill, proprioception  to assist with cervical, scapular, scapulothoracic and UE control with self care, reaching, carrying, lifting, house/yardwork, driving/computer work.  [] (02366) Therapist is in constant attendance of 2 or more patients providing skilled therapy interventions, but not providing any significant amount of measurable one-on-one time to either patient, for improvements in cervical, scapular, scapulothoracic and UE control with self care, reaching, carrying, lifting, house/yardwork, driving, computer work. Therapeutic Activities:    [x] (60947 or 26398) Provided verbal/tactile cueing for activities related to improving balance, coordination, kinesthetic sense, posture, motor skill, proprioception and motor activation to allow for proper function of cervical, scapular, scapulothoracic and UE control with self care, carrying, lifting, driving/computer work.      Home Exercise Program:    [x] (54428) Reviewed/Progressed HEP activities related to strengthening, flexibility, endurance, ROM of cervical, scapular, scapulothoracic and UE control with self care, reaching, carrying, lifting, house/yardwork, driving/computer work  [] (45611) Reviewed/Progressed HEP activities related to improving balance, coordination, kinesthetic sense, posture, motor skill, proprioception of cervical, scapular, scapulothoracic and UE control with self care, reaching, carrying, lifting, house/yardwork, driving/computer work      Manual Treatments:  PROM / STM / Oscillations-Mobs:  G-I, II, III, IV (PA's, Inf., Post.)  [x] (03513) Provided manual therapy to mobilize soft tissue/joints of cervical/CT, scapular GHJ and UE for the purpose of decreasing headache, modulating pain, promoting relaxation,  increasing ROM, reducing/eliminating soft tissue swelling/inflammation/restriction, improving soft tissue extensibility and allowing for proper ROM for normal function with self care, reaching, carrying, lifting, house/yardwork, driving/computer work    Charges:  Timed Code Treatment Minutes: 45   Total Treatment Minutes: 45       [] EVAL - LOW (15795)   [] EVAL - MOD (91089)  [] EVAL - HIGH (74359)  [] RE-EVAL (93670)  [x] RU(99187) x  1     [] Ionto  [] NMR (61993) x       [] Vaso  [x] Manual (84866) x  1     [] Ultrasound  [x] TA x   1     [] Mech Traction (85976)  [] Aquatic Therapy x      [] ES (un) (82174):   [] Home Management Training x  [] ES(attended) (86572)   [] Dry Needling 1-2 muscles (85634):  [] Dry Needling 3+ muscles (842250  [] Group:      [] Other:     GOALS:  Patient stated goal: alleviate pain  [] Progressing: [] Met: [] Not Met: [] Adjusted    Therapist goals for Patient:   Short Term Goals: To be achieved in: 2 weeks  1. Independent in HEP and progression per patient tolerance, in order to prevent re-injury. [] Progressing: [] Met: [] Not Met: [] Adjusted  2. Patient will have a decrease in pain to facilitate improvement in movement, function, and ADLs as indicated by Functional Deficits. [] Progressing: [] Met: [] Not Met: [] Adjusted    Long Term Goals:  To be achieved in: 6 weeks  1. Disability index score of 10% or less for the NDI to assist with reaching prior level of function. [] Progressing: [] Met: [] Not Met: [] Adjusted  2. Patient will perform pain-free & maintain AROM of cervical/thoracic spine  to allow for proper joint functioning as indicated by patients Functional Deficits. [] Progressing: [] Met: [] Not Met: [] Adjusted  3. Patient will demonstrate an increase in postural awareness and control and activation of deep cervical stabilizers to allow for proper functional mobility as indicated by patients Functional Deficits. [] Progressing: [] Met: [] Not Met: [] Adjusted  4. Patient will return to functional activities including sleeping without increased symptoms or restriction. [] Progressing: [] Met: [] Not Met: [] Adjusted  5. Patient to demonstrate L shoulder complex strength of 4+/5 throughout in order to reach [place] & lift objects pain-free. [] Progressing: [] Met: [] Not Met: [] Adjusted       Overall Progression Towards Functional goals/ Treatment Progress Update:  [] Patient is progressing as expected towards functional goals listed. [] Progression is slowed due to complexities/Impairments listed. [] Progression has been slowed due to co-morbidities. [x] Plan just implemented, too soon to assess goals progression <30days   [] Goals require adjustment due to lack of progress  [] Patient is not progressing as expected and requires additional follow up with physician  [] Other    Persisting Functional Limitations/Impairments:  []Sitting []Standing   []Walking []Squatting/bending    []Stairs []ADL's    []Transfers []Reaching  []Housework []Lifting  []Driving []Job related tasks  []Sports/Recreation  []Sleeping  []Other:    ASSESSMENT:  Pt's LUE symptoms progressing since last session however weakness is becoming more evident to patient and reports becoming worried she'll develop more tightness in L shoulder.   With each ex at wall, pt began ex

## 2021-04-13 ENCOUNTER — HOSPITAL ENCOUNTER (OUTPATIENT)
Dept: PHYSICAL THERAPY | Age: 51
Setting detail: THERAPIES SERIES
Discharge: HOME | End: 2021-04-13
Payer: COMMERCIAL

## 2021-04-13 ENCOUNTER — TELEPHONE (OUTPATIENT)
Dept: INTERNAL MEDICINE CLINIC | Age: 51
End: 2021-04-13

## 2021-04-13 DIAGNOSIS — M54.12 CERVICAL RADICULAR PAIN: Primary | ICD-10-CM

## 2021-04-13 PROCEDURE — 97140 MANUAL THERAPY 1/> REGIONS: CPT

## 2021-04-13 PROCEDURE — 97530 THERAPEUTIC ACTIVITIES: CPT

## 2021-04-13 RX ORDER — METHOCARBAMOL 750 MG/1
750 TABLET, FILM COATED ORAL 4 TIMES DAILY PRN
Qty: 40 TABLET | Refills: 2 | Status: SHIPPED | OUTPATIENT
Start: 2021-04-13 | End: 2021-04-23

## 2021-04-13 RX ORDER — NAPROXEN 500 MG/1
500 TABLET ORAL 2 TIMES DAILY WITH MEALS
Qty: 60 TABLET | Refills: 1 | Status: SHIPPED | OUTPATIENT
Start: 2021-04-13 | End: 2021-07-09 | Stop reason: ALTCHOICE

## 2021-04-13 NOTE — TELEPHONE ENCOUNTER
Patient is requesting a refill on her muscle relaxer to help w/the pain from her pinched nerve. She did not remember the name of the medication and she was driving when she called. Patient also requesting a referral to ortho Dr Peri Hodgkins. During her PT visit today the therapist suggested she may want to see ortho.

## 2021-04-13 NOTE — FLOWSHEET NOTE
SilvestrejordanKaran viramontes  Phone: [phone]   Fax: [phone]    Physical Therapy Treatment Note/ Progress Report:     Date:  2021    Patient Name:  Goran Gonzalez    :  1970  MRN: [id number]  Restrictions/Precautions:    Medical/Treatment Diagnosis Information:  · Diagnosis: M54.12 (ICD-10-CM) - Cervical radicular pain  · Treatment Diagnosis: Misalinged cervicothoracic spine with decreased stability, Pain w/cervical extension, Decreased scapular stability  Insurance/Certification information:  PT Insurance Information: Aetna. 120 combined visits  Physician Information:  Referring Practitioner: Nathaniel Vázquez MD  Plan of care signed (Y/N): [x]  Yes []  No     Date of Patient follow up with Physician:      Progress Report: []  Yes  [x]  No     Date Range for reporting period:  Beginning: 3/29/21  Ending:     Progress report due (10 Rx/or 30 days whichever is less): visit #10 or 51 (date)     Recertification due (POC duration/ or 90 days whichever is less): visit # or 21(date)     Visit # Insurance Allowable Auth required? Date Range    120 combined visits []  Yes  [x]  No pcy     Latex Allergy:  [x]NO      []YES  Preferred Language for Healthcare:   [x]English       []other:    Functional Scale:       Date assessed:  NDI: raw score = 17; dysfunction = 34%   3/29    Pain level:  3.5-4/10 Anterior L GH joint, L upper trap, minimal pain in biceps    SUBJECTIVE:      Pt reports pain is worse today, is located more in L shoulder vs in biceps today. BP was ~150/111 yesterday. Ate someone else's food that was more salty yesterday, realized afterwards she shouldn't have and has been drinking more water. Did ex's yesterday and today, didn't get as much relief as she usually does and only position she can feel any better is with LUE fully elevated over her head. No SOB.   Felt good after last session, didn't think cervical traction from time before made too much of a difference    OBJECTIVE: See eval   Observation:    Test measurements:     4/13:  BP R arm 148/105 , L arm 154/96 HR 99   -wrote down for pt to take with her for PCP    RESTRICTIONS/PRECAUTIONS:  HTN, pseudotumor cerebri    Exercises/Interventions:   Therapeutic Exercise (32944) Resistance / level Sets/sec Reps Notes   UBE  -fwd / bwd      Pulley's  -flex      Cerv & scap stabs at wall  Chin tucks  Cervical rotation  W's  W to Y's     Resistive bands  Seated B GH ER w/scap retract  Mid row  [address] ext  [address] ER  Robber pose   Orange  Lime  Lime  Orange   Orange     Seated stretches                                   Therapeutic Activities (45481)       3/29:  Discussed improved cervicothoracic spine posture, especially when seated for work and driving. 4/9:  Discussed improved posture while driving and reducing dependency on LUE as she places hand on top of wheel. NMR re-education (26113)       Mat table  Supine  Chin tucks  scap retract  Shoulder flexion    Seated median nerve glides, wrist flex & ext                        -unable to maintain good shld abd & UT compensations developed   Prone Hughston's   -T's  -Y's                              Manual Intervention (86755)       Cerv mobs R rot mobs to C5  P/A mobs grade II-III C4-C7    2 mins     ULNT:  Median glides  -elbow flex & ext  -wrist flex & ext  Radial glides  Ulnar glides           Supine stretches:  Upper trap  SCM    2x30\" B       SOR  4 mins     STM B cervical paraspinals, B upper trap, B suboccipitals  4 mins     Cervical distraction  10\" hold x2 mins         Modalities:   4/12:  Supine CP to posterior cervical spine and L shoulder. 4/9: deferred traction, pt plans to assess symptoms throughout the day and determine if traction helped or if massage from family helped her symptoms. Will review and determine appropriateness next session.     4/6:  Mechanical posture, motor skill, proprioception and motor activation to allow for proper function of cervical, scapular, scapulothoracic and UE control with self care, carrying, lifting, driving/computer work.      Home Exercise Program:    [x] (30937) Reviewed/Progressed HEP activities related to strengthening, flexibility, endurance, ROM of cervical, scapular, scapulothoracic and UE control with self care, reaching, carrying, lifting, house/yardwork, driving/computer work  [] (03048) Reviewed/Progressed HEP activities related to improving balance, coordination, kinesthetic sense, posture, motor skill, proprioception of cervical, scapular, scapulothoracic and UE control with self care, reaching, carrying, lifting, house/yardwork, driving/computer work      Manual Treatments:  PROM / STM / Oscillations-Mobs:  G-I, II, III, IV (PA's, Inf., Post.)  [x] (20192) Provided manual therapy to mobilize soft tissue/joints of cervical/CT, scapular GHJ and UE for the purpose of decreasing headache, modulating pain, promoting relaxation,  increasing ROM, reducing/eliminating soft tissue swelling/inflammation/restriction, improving soft tissue extensibility and allowing for proper ROM for normal function with self care, reaching, carrying, lifting, house/yardwork, driving/computer work    Charges:  Timed Code Treatment Minutes: 30   Total Treatment Minutes: 45       [] EVAL - LOW (49164)   [] EVAL - MOD (02526)  [] EVAL - HIGH (94223)  [] RE-EVAL (16258)  [] KT(04893) x  1     [] Ionto  [] NMR (86945) x       [] Vaso  [x] Manual ([id number]) x  1     [] Ultrasound  [x] TA x   1     [] Mech Traction (71969)  [] Aquatic Therapy x      [] ES (un) (69202):   [] Home Management Training x  [] ES(attended) (74578)   [] Dry Needling 1-2 muscles (72325):  [] Dry Needling 3+ muscles (851274  [] Group:      [] Other:     GOALS:  Patient stated goal: alleviate pain  [] Progressing: [] Met: [] Not Met: [] Adjusted    Therapist goals for Patient:   Short Term

## 2021-04-14 ENCOUNTER — IMMUNIZATION (OUTPATIENT)
Dept: PRIMARY CARE CLINIC | Age: 51
End: 2021-04-14
Payer: COMMERCIAL

## 2021-04-14 PROCEDURE — 91301 COVID-19, MODERNA VACCINE 100MCG/0.5ML DOSE: CPT | Performed by: FAMILY MEDICINE

## 2021-04-14 PROCEDURE — 0012A COVID-19, MODERNA VACCINE 100MCG/0.5ML DOSE: CPT | Performed by: FAMILY MEDICINE

## 2021-04-16 ENCOUNTER — HOSPITAL ENCOUNTER (OUTPATIENT)
Dept: PHYSICAL THERAPY | Age: 51
Setting detail: THERAPIES SERIES
Discharge: HOME | End: 2021-04-16
Payer: COMMERCIAL

## 2021-04-16 PROCEDURE — 97110 THERAPEUTIC EXERCISES: CPT

## 2021-04-16 PROCEDURE — 97140 MANUAL THERAPY 1/> REGIONS: CPT

## 2021-04-16 PROCEDURE — 97112 NEUROMUSCULAR REEDUCATION: CPT

## 2021-04-16 NOTE — FLOWSHEET NOTE
[facility]ien[facility]  Phone: [phone]   Fax: [phone]    Physical Therapy Treatment Note/ Progress Report:     Date:  2021    Patient Name:  Goran Gonzalez    :  1970  MRN: [id number]  Restrictions/Precautions:    Medical/Treatment Diagnosis Information:  · Diagnosis: M54.12 (ICD-10-CM) - Cervical radicular pain  · Treatment Diagnosis: Misalinged cervicothoracic spine with decreased stability, Pain w/cervical extension, Decreased scapular stability  Insurance/Certification information:  PT Insurance Information: [employer]. 120 combined visits  Physician Information:  Referring Practitioner: Nathaniel Vázquez MD  Plan of care signed (Y/N): [x]  Yes []  No     Date of Patient follow up with Physician:      Progress Report: []  Yes  [x]  No     Date Range for reporting period:  Beginning: 3/29/21  Ending:     Progress report due (10 Rx/or 30 days whichever is less): visit #10 or 78 (date)     Recertification due (POC duration/ or 90 days whichever is less): visit # or 21(date)     Visit # Insurance Allowable Auth required? Date Range    120 combined visits []  Yes  [x]  No pcy     Latex Allergy:  [x]NO      []YES  Preferred Language for Healthcare:   [x]English       []other:    Functional Scale:       Date assessed:  NDI: raw score = 17; dysfunction = 34%   3/29    Pain level:  2/10 Posterior L GH joint & L upper trap, aching in biceps    SUBJECTIVE:    Pt reports that she drove a long distance since her last visit which exacerbated her pain, radiating to her elbow. However, pain is feeling better today. Her pain is starting to move just into her upper back. She states that her blood pressure is back down and she has been eating regularly.        OBJECTIVE:    Observation:    Test measurements:     :  BP R arm 148/105 , L arm 154/96 HR 99   -wrote down for pt to take with her for PCP    RESTRICTIONS/PRECAUTIONS:  HTN, pseudotumor cerebri    Exercises/Interventions:   Therapeutic Exercise (49675) Resistance / level Sets/sec Reps Notes   UBE  -fwd / bwd  2'/2'     Pulley's  -flex & abd  Unilateral pec stretch at wall  3'  3x20\"     Cerv & scap stabs at wall  Chin tucks  Cervical rotation  W's  W to Y's  2\" hold15  1515  15   Resistive bands  Seated B GH ER w/scap retract  Mid row  GH ext  [facility] ER  Robber pose   Orange  Lime  Lime  Lime   Orange  15 L  15   Seated stretches                                   Therapeutic Activities (38027)       3/29:  Discussed improved cervicothoracic spine posture, especially when seated for work and driving. 4/9:  Discussed improved posture while driving and reducing dependency on LUE as she places hand on top of wheel. NMR re-education (19900)       Mat table  Supine  Chin tucks  scap retract  Shoulder flexion    Seated median nerve glides, wrist flex & ext                        -unable to maintain good shld abd & UT compensations developed   Prone Hughston's   -T's  -Y's                              Manual Intervention (51274)       Cerv mobs R rot mobs to C5  P/A mobs grade II-III C4-C7  2 mins  2 mins     ULNT:  Median glides  -elbow flex & ext  -wrist flex & ext  Radial glides  Ulnar glides           Supine stretches:  Upper trap  SCM  B pec minor stretch    2x30\" B    2x30\"     SOR  4 mins     STM B cervical paraspinals, B upper trap, B suboccipitals  4 mins     Cervical distraction  10\" hold x2 mins         Modalities:   4/12:  Supine CP to posterior cervical spine and L shoulder. 4/9: deferred traction, pt plans to assess symptoms throughout the day and determine if traction helped or if massage from family helped her symptoms. Will review and determine appropriateness next session. 4/6:  Mechanical cervical traction 12#/8#, 30\" on / 10\" off, x10 mins w/bolsters.   Patient was given panic button as well scapulothoracic and UE control with self care, carrying, lifting, driving/computer work. Home Exercise Program:    [x] (36598) Reviewed/Progressed HEP activities related to strengthening, flexibility, endurance, ROM of cervical, scapular, scapulothoracic and UE control with self care, reaching, carrying, lifting, house/yardwork, driving/computer work  [] (36336) Reviewed/Progressed HEP activities related to improving balance, coordination, kinesthetic sense, posture, motor skill, proprioception of cervical, scapular, scapulothoracic and UE control with self care, reaching, carrying, lifting, house/yardwork, driving/computer work      Manual Treatments:  PROM / STM / Oscillations-Mobs:  G-I, II, III, IV (PA's, Inf., Post.)  [x] (19671) Provided manual therapy to mobilize soft tissue/joints of cervical/CT, scapular GHJ and UE for the purpose of decreasing headache, modulating pain, promoting relaxation,  increasing ROM, reducing/eliminating soft tissue swelling/inflammation/restriction, improving soft tissue extensibility and allowing for proper ROM for normal function with self care, reaching, carrying, lifting, house/yardwork, driving/computer work    Charges:  Timed Code Treatment Minutes: 40   Total Treatment Minutes: 40       [] EVAL - LOW (75583)   [] EVAL - MOD (03876)  [] EVAL - HIGH (65321)  [] RE-EVAL (68156)  [x] NF(06870) x  1     [] Ionto  [x] NMR (00690) x 1      [] Vaso  [x] Manual (82070) x  1     [] Ultrasound  [] TA x        [] Mech Traction (26368)  [] Aquatic Therapy x      [] ES (un) (03634):   [] Home Management Training x  [] ES(attended) (62197)   [] Dry Needling 1-2 muscles (85293):  [] Dry Needling 3+ muscles (640139  [] Group:      [] Other:     GOALS:  Patient stated goal: alleviate pain  [] Progressing: [] Met: [] Not Met: [] Adjusted    Therapist goals for Patient:   Short Term Goals: To be achieved in: 2 weeks  1.  Independent in HEP and progression per patient tolerance, in order to prevent re-injury. [] Progressing: [] Met: [] Not Met: [] Adjusted  2. Patient will have a decrease in pain to facilitate improvement in movement, function, and ADLs as indicated by Functional Deficits. [] Progressing: [] Met: [] Not Met: [] Adjusted    Long Term Goals: To be achieved in: 6 weeks  1. Disability index score of 10% or less for the NDI to assist with reaching prior level of function. [] Progressing: [] Met: [] Not Met: [] Adjusted  2. Patient will perform pain-free & maintain AROM of cervical/thoracic spine  to allow for proper joint functioning as indicated by patients Functional Deficits. [] Progressing: [] Met: [] Not Met: [] Adjusted  3. Patient will demonstrate an increase in postural awareness and control and activation of deep cervical stabilizers to allow for proper functional mobility as indicated by patients Functional Deficits. [] Progressing: [] Met: [] Not Met: [] Adjusted  4. Patient will return to functional activities including sleeping without increased symptoms or restriction. [] Progressing: [] Met: [] Not Met: [] Adjusted  5. Patient to demonstrate L shoulder complex strength of 4+/5 throughout in order to reach [place] & lift objects pain-free. [] Progressing: [] Met: [] Not Met: [] Adjusted       Overall Progression Towards Functional goals/ Treatment Progress Update:  [] Patient is progressing as expected towards functional goals listed. [] Progression is slowed due to complexities/Impairments listed. [] Progression has been slowed due to co-morbidities.   [x] Plan just implemented, too soon to assess goals progression <30days   [] Goals require adjustment due to lack of progress  [] Patient is not progressing as expected and requires additional follow up with physician  [] Other    Persisting Functional Limitations/Impairments:  []Sitting []Standing   []Walking []Squatting/bending    []Stairs []ADL's    []Transfers []Reaching  []Housework []Lifting  []Driving []Job related tasks  []Sports/Recreation  []Sleeping  []Other:    ASSESSMENT:    Patient's symptoms are progressing well w/pain becoming more centralized with each session. Patient still demo's weakness of L scapula but is progressing and able to perform ex's without compensations. Continue to progress strength & improve posture to further centralize symptoms. Treatment/Activity Tolerance:  [x] Patient able to complete tx   [] Patient limited by fatique  [] Patient limited by pain   [] Patient limited by other medical complications  [] Other:     Prognosis: [x] Good [] Fair  [] Poor    Patient Requires Follow-up: [x] Yes  [] No    PLAN: See eval. PT 2x / week for 6 weeks. [x] Continue per plan of care [] Alter current plan (see comments)  [] Plan of care initiated [] Hold pending MD visit [] Discharge    Electronically signed by: Venkat Tim PT       Note: If patient does not return for scheduled/ recommended follow up visits, this note will serve as a discharge from care along with most recent update on progress.

## 2021-04-20 ENCOUNTER — HOSPITAL ENCOUNTER (OUTPATIENT)
Dept: PHYSICAL THERAPY | Age: 51
Setting detail: THERAPIES SERIES
Discharge: HOME | End: 2021-04-20
Payer: COMMERCIAL

## 2021-04-20 PROCEDURE — 97110 THERAPEUTIC EXERCISES: CPT

## 2021-04-20 PROCEDURE — 97140 MANUAL THERAPY 1/> REGIONS: CPT

## 2021-04-23 ENCOUNTER — HOSPITAL ENCOUNTER (OUTPATIENT)
Dept: PHYSICAL THERAPY | Age: 51
Setting detail: THERAPIES SERIES
Discharge: HOME | End: 2021-04-23
Payer: COMMERCIAL

## 2021-04-23 PROCEDURE — 97112 NEUROMUSCULAR REEDUCATION: CPT

## 2021-04-23 PROCEDURE — 97110 THERAPEUTIC EXERCISES: CPT

## 2021-04-23 PROCEDURE — 97140 MANUAL THERAPY 1/> REGIONS: CPT

## 2021-04-23 NOTE — FLOWSHEET NOTE
Karan Lutz  Phone: [phone]   Fax: [phone]    Physical Therapy Treatment Note/ Progress Report:     Date:  2021    Patient Name:  Iveth Batista    :  1970  MRN: [id number]  Restrictions/Precautions:    Medical/Treatment Diagnosis Information:  · Diagnosis: M54.12 (ICD-10-CM) - Cervical radicular pain  · Treatment Diagnosis: Misalinged cervicothoracic spine with decreased stability, Pain w/cervical extension, Decreased scapular stability  Insurance/Certification information:  PT Insurance Information: A[employer]. 120 combined visits  Physician Information:  Referring Practitioner: Claudia Mtz MD  Plan of care signed (Y/N): [x]  Yes []  No     Date of Patient follow up with Physician:      Progress Report: []  Yes  [x]  No     Date Range for reporting period:  Beginning: 3/29/21  Ending:     Progress report due (10 Rx/or 30 days whichever is less): visit #10 or  (date)     Recertification due (POC duration/ or 90 days whichever is less): visit # or 21(date)     Visit # Insurance Allowable Auth required? Date Range    120 combined visits []  Yes  [x]  No pcy     Latex Allergy:  [x]NO      []YES  Preferred Language for Healthcare:   [x]English       []other:    Functional Scale:       Date assessed:  NDI: raw score = 17; dysfunction = 34%   3/29    Pain level:  /10 -anterior L GH joint, L biceps and elbow    SUBJECTIVE:     States that pain has been a little further down LUE and more aggravating. Pain levels are a little lower though. Has had more stress this week which may be contributing to her change & increase in symptoms.         OBJECTIVE:    Observation:    Test measurements:     :  BP R arm 148/105 , L arm 154/96 HR 99   -wrote down for pt to take with her for PCP   :  Pt 11 mins late    RESTRICTIONS/PRECAUTIONS:  HTN, pseudotumor cerebri    Exercises/Interventions: Therapeutic Exercise (14388) Resistance / level Sets/sec Reps Notes   UBE  -fwd / bwd  2'/2'     Pulley's  -flex & abd  Unilateral pec stretch at wall     x15 ea -flex caused some discomfort in L scapula   Cerv & scap stabs at wall  Chin tucks  Cervical rotation  B scap retract  W's  W to Y's  B shoulder flex/scap w/chin tuck  B shoulder abd w/chin tuck  2\" hold15  15  15    15   Resistive bands  Seated B GH ER w/scap retract  Mid row  GH ext  [address] ER  Robber pose   Orange  Lime  Lime  Lime   Orange  15   Seated stretches                                   Therapeutic Activities (46153)       3/29:  Discussed improved cervicothoracic spine posture, especially when seated for work and driving. 4/9:  Discussed improved posture while driving and reducing dependency on LUE as she places hand on top of wheel. [facility] re-education (19886)       Mat table  Supine  Chin tucks  scap retract  Shoulder flexion    Seated median nerve glides, wrist flex & ext    PEACE scap retract & protract                 15             -unable to maintain good shld abd & UT compensations developed   Prone Hughston's   -T's  -Y's                              Manual Intervention (88915)       Cerv mobs R rot mobs to C5  P/A mobs grade II-III C4-C7  2 mins     ULNT:  Median glides  -elbow flex & ext  -wrist flex & ext  Radial glides  Ulnar glides           Supine stretches:  Upper trap  SCM  B pec minor stretch    3x30\" R    3x30\"     SOR  4 mins    STM B cervical paraspinals, B upper trap, B suboccipitals  4 mins    Cervical distraction  10\" hold x4 mins    L shoulder PROM into each direction  6 mins                    Modalities:   4/12:  Supine CP to posterior cervical spine and L shoulder. 4/9: deferred traction, pt plans to assess symptoms throughout the day and determine if traction helped or if massage from family helped her symptoms. Will review and determine appropriateness next session.     4/6: Short Term Goals: To be achieved in: 2 weeks  1. Independent in HEP and progression per patient tolerance, in order to prevent re-injury. [] Progressing: [] Met: [] Not Met: [] Adjusted  2. Patient will have a decrease in pain to facilitate improvement in movement, function, and ADLs as indicated by Functional Deficits. [] Progressing: [] Met: [] Not Met: [] Adjusted    Long Term Goals: To be achieved in: 6 weeks  1. Disability index score of 10% or less for the NDI to assist with reaching prior level of function. [] Progressing: [] Met: [] Not Met: [] Adjusted  2. Patient will perform pain-free & maintain AROM of cervical/thoracic spine  to allow for proper joint functioning as indicated by patients Functional Deficits. [] Progressing: [] Met: [] Not Met: [] Adjusted  3. Patient will demonstrate an increase in postural awareness and control and activation of deep cervical stabilizers to allow for proper functional mobility as indicated by patients Functional Deficits. [] Progressing: [] Met: [] Not Met: [] Adjusted  4. Patient will return to functional activities including sleeping without increased symptoms or restriction. [] Progressing: [] Met: [] Not Met: [] Adjusted  5. Patient to demonstrate L shoulder complex strength of 4+/5 throughout in order to reach [place] & lift objects pain-free. [] Progressing: [] Met: [] Not Met: [] Adjusted       Overall Progression Towards Functional goals/ Treatment Progress Update:  [] Patient is progressing as expected towards functional goals listed. [] Progression is slowed due to complexities/Impairments listed. [] Progression has been slowed due to co-morbidities.   [x] Plan just implemented, too soon to assess goals progression <30days   [] Goals require adjustment due to lack of progress  [] Patient is not progressing as expected and requires additional follow up with physician  [] Other    Persisting Functional

## 2021-04-27 ENCOUNTER — HOSPITAL ENCOUNTER (OUTPATIENT)
Dept: PHYSICAL THERAPY | Age: 51
Setting detail: THERAPIES SERIES
Discharge: HOME | End: 2021-04-27
Payer: COMMERCIAL

## 2021-04-27 PROCEDURE — 97110 THERAPEUTIC EXERCISES: CPT

## 2021-04-27 PROCEDURE — 97530 THERAPEUTIC ACTIVITIES: CPT

## 2021-04-27 NOTE — FLOWSHEET NOTE
[facility]  Phone: [phone]   Fax: [phone]     Physical Therapy Re-Certification Plan of Care    Dear Ramu Hull MD,    We had the pleasure of treating the following patient for physical therapy services at [facility]. A summary of our findings can be found in the updated assessment below. This includes our plan of care. If you have any questions or concerns regarding these findings, please do not hesitate to contact me at the office phone number checked above. Thank you for the referral.     Physician Signature:________________________________Date:__________________  By signing above (or electronic signature), therapists plan is approved by physician      Functional Outcome: NDI: raw score = 17; dysfunction = 34%   3/29     NDI: raw score = 22; dysfunction = 44%   4/27    CERV ROM       Cervical Flexion WNL     Cervical Extension WFL     Cervical SB WFL  -L UT stretch w/ R SB   Cervical rotation [facility]      Strength  Left   Shoulder Flex 4-   Shoulder Scap 4-    Shoulder ER 3+   Shoulder IR  4-         Overall Response to Treatment:   []Patient is responding well to treatment and improvement is noted with regards  to goals   []Patient should continue to improve in reasonable time if they continue HEP   []Patient has plateaued and is no longer responding to skilled PT intervention    []Patient is getting worse and would benefit from return to referring MD   []Patient unable to adhere to initial POC   [x]Other: Patient's symptoms in distal LUE have been improving, however as symptoms centralize, additional pain is becoming more prevalent in L upper trap and pec minor.   Pt complains of pain in anterior L GH joint primarily but does have radiating symptoms into L biceps and elbow at times, much less frequently than at initial eval.  Pt's cervical AROM has improved but L shoulder 120 combined visits []  Yes  [x]  No pcy     Latex Allergy:  [x]NO      []YES  Preferred Language for Healthcare:   [x]English       []other:    Functional Scale:       Date assessed:  NDI: raw score = 17; dysfunction = 34%   3/29  NDI: raw score = 22; dysfunction = 44%   4/27    Pain level:  2/10 -anterior L GH joint, L biceps and elbow    SUBJECTIVE:     Has been having constant, aggravating pain in anterior L GH joint and around pec minor and burning sensation in L upper trap and superior GH joint, at times down into biceps and elbow. Does feel that her ROM is better in her neck and shoulder though and did verify her good posture and form with HEP. Continues to wake throughout the night due to pain, some nights are better than others.   Has been reaching into [place] cabinets, has to be careful due to tremors but is able to reach higher than to begin PT.        OBJECTIVE:    Observation:  4/27:  L upper trap compensations w/seated LUE movements, is not present during supine LUE movements   Test measurements:     4/13:  BP R arm 148/105 , L arm 154/96 HR 99   -wrote down for pt to take with her for PCP  4/20:  Pt 11 mins late    4/27:  CERV ROM       Cervical Flexion WNL     Cervical Extension WFL     Cervical SB WFL  -L UT stretch w/ R SB   Cervical rotation WFL      Strength / Myotomes Left   Shoulder Flex 4-   Shoulder Scap 4-    Shoulder ER 3+   Shoulder IR  4-         RESTRICTIONS/PRECAUTIONS:  HTN, pseudotumor cerebri    Exercises/Interventions:   Therapeutic Exercise (08875) Resistance / level Sets/sec Reps Notes   UBE  -fwd / bwd  2'/2'     Pulley's  -flex & abd  Unilateral pec stretch at wall      -flex caused some discomfort in L scapula   Cerv & scap stabs at wall  Chin tucks  Cervical rotation  B scap retract  W's  W to Y's  B shoulder flex/scap w/chin tuck  B shoulder abd w/chin tuck     Resistive bands  Seated B GH ER w/scap retract  Mid row  GH ext  [address] ER  Robber pose   Orange  Lime  Lime  Lime Program:     Access Code: [email or web address]  URL: [employer]/  Date: 04/27/2021  Prepared by: Thor Quince    Exercises  Pec Minor Stretch - 3 x daily - 7 x weekly - 3 sets - 30 hold  Sidelying Shoulder ER with Towel and Dumbbell - 2 x daily - 7 x weekly - 15 reps  Sidelying Shoulder Abduction Palm Forward - 2 x daily - 7 x weekly - 15 reps  Supine Scapular Protraction in Flexion with Dumbbells - 2 x daily - 7 x weekly - 15 reps    Access Code: [email or web address]  URL: [email or web address]  Date: 03/29/2021  Prepared by: Thor Quince     Exercises  Supine Chin Tuck - 2 x daily - 7 x weekly - 10 reps  Seated Scapular Retraction - 2 x daily - 7 x weekly - 10 reps  Supine Shoulder Flexion Extension Full Range AROM - 2 x daily - 7 x weekly - 10 reps  Shoulder External Rotation and Scapular Retraction with Resistance - 2 x daily - 7 x weekly - 10 reps    Therapeutic Exercise and NMR EXR  [x] (56259) Provided verbal/tactile cueing for activities related to strengthening, flexibility, endurance, ROM  for improvements in cervical, postural, scapular, scapulothoracic and UE control with self care, reaching, carrying, lifting, house/yardwork, driving/computer work.    [] (87579) Provided verbal/tactile cueing for activities related to improving balance, coordination, kinesthetic sense, posture, motor skill, proprioception  to assist with cervical, scapular, scapulothoracic and UE control with self care, reaching, carrying, lifting, house/yardwork, driving/computer work.  [] (12232) Therapist is in constant attendance of 2 or more patients providing skilled therapy interventions, but not providing any significant amount of measurable one-on-one time to either patient, for improvements in cervical, scapular, scapulothoracic and UE control with self care, reaching, carrying, lifting, house/yardwork, driving, computer work.      Therapeutic Activities:    [x] (41172 or 65769) Provided verbal/tactile cueing for activities related to improving balance, coordination, kinesthetic sense, posture, motor skill, proprioception and motor activation to allow for proper function of cervical, scapular, scapulothoracic and UE control with self care, carrying, lifting, driving/computer work.      Home Exercise Program:    [x] (17181) Reviewed/Progressed HEP activities related to strengthening, flexibility, endurance, ROM of cervical, scapular, scapulothoracic and UE control with self care, reaching, carrying, lifting, house/yardwork, driving/computer work  [] (02359) Reviewed/Progressed HEP activities related to improving balance, coordination, kinesthetic sense, posture, motor skill, proprioception of cervical, scapular, scapulothoracic and UE control with self care, reaching, carrying, lifting, house/yardwork, driving/computer work      Manual Treatments:  PROM / STM / Oscillations-Mobs:  G-I, II, III, IV (PA's, Inf., Post.)  [x] (20745) Provided manual therapy to mobilize soft tissue/joints of cervical/CT, scapular GHJ and UE for the purpose of decreasing headache, modulating pain, promoting relaxation,  increasing ROM, reducing/eliminating soft tissue swelling/inflammation/restriction, improving soft tissue extensibility and allowing for proper ROM for normal function with self care, reaching, carrying, lifting, house/yardwork, driving/computer work    Charges:  Timed Code Treatment Minutes: 42   Total Treatment Minutes: 42       [] EVAL - LOW (52739)   [] EVAL - MOD (43357)  [] EVAL - HIGH (47223)  [] RE-EVAL (01925)  [x] IS(01834) x  1     [] Ionto  [] NMR (39210) x       [] Vaso  [] Manual (30113) x       [] Ultrasound  [x] TA x 2       [] Mech Traction (80375)  [] Aquatic Therapy x      [] ES (un) (22475):   [] Home Management Training x  [] ES(attended) (30410)   [] Dry Needling 1-2 muscles (16753):  [] Dry Needling 3+ muscles (218699  [] Group:      [] Other:     GOALS:  Patient stated goal: alleviate pain  [] Progressing: [] Met: [] Not Met: [] Adjusted    Therapist goals for Patient:   Short Term Goals: To be achieved in: 2 weeks  1. Independent in HEP and progression per patient tolerance, in order to prevent re-injury. [] Progressing: [x] Met: [] Not Met: [] Adjusted  2. Patient will have a decrease in pain to facilitate improvement in movement, function, and ADLs as indicated by Functional Deficits. [] Progressing: [x] Met: [] Not Met: [] Adjusted    Long Term Goals: To be achieved in: 6 weeks  1. Disability index score of 10% or less for the NDI to assist with reaching prior level of function. [] Progressing: [] Met: [] Not Met: [] Adjusted  2. Patient will perform pain-free & maintain AROM of cervical/thoracic spine  to allow for proper joint functioning as indicated by patients Functional Deficits. [] Progressing: [x] Met: [] Not Met: [] Adjusted  3. Patient will demonstrate an increase in postural awareness and control and activation of deep cervical stabilizers to allow for proper functional mobility as indicated by patients Functional Deficits. [x] Progressing: [x] Met: [] Not Met: [] Adjusted  4. Patient will return to functional activities including sleeping without increased symptoms or restriction. [x] Progressing: [] Met: [] Not Met: [] Adjusted  5. Patient to demonstrate L shoulder complex strength of 4+/5 throughout in order to reach [place] & lift objects pain-free. [x] Progressing: [] Met: [] Not Met: [] Adjusted       Overall Progression Towards Functional goals/ Treatment Progress Update:  [] Patient is progressing as expected towards functional goals listed. [] Progression is slowed due to complexities/Impairments listed. [] Progression has been slowed due to co-morbidities.   [x] Plan just implemented, too soon to assess goals progression <30days   [] Goals require adjustment due to lack of progress  [] Patient is not progressing as expected and requires additional follow up with physician  [] as a discharge from care along with most recent update on progress.

## 2021-04-30 ENCOUNTER — HOSPITAL ENCOUNTER (OUTPATIENT)
Dept: PHYSICAL THERAPY | Age: 51
Setting detail: THERAPIES SERIES
Discharge: HOME | End: 2021-04-30
Payer: COMMERCIAL

## 2021-04-30 PROCEDURE — 97110 THERAPEUTIC EXERCISES: CPT

## 2021-04-30 PROCEDURE — 97140 MANUAL THERAPY 1/> REGIONS: CPT

## 2021-04-30 NOTE — FLOWSHEET NOTE
[facility]  Phone: [phone]   Fax: [phone]      Physical Therapy Treatment Note/ Progress Report:     Date:  2021    Patient Name:  Elsie Ro    :  1970  MRN: [id number]  Restrictions/Precautions:    Medical/Treatment Diagnosis Information:  · Diagnosis: M54.12 (ICD-10-CM) - Cervical radicular pain  · Treatment Diagnosis: Misalinged cervicothoracic spine with decreased stability, Pain w/cervical extension, Decreased scapular stability  Insurance/Certification information:  PT Insurance Information: [employer]. 120 combined visits  Physician Information:  Referring Practitioner: Tamara Morris MD  Plan of care signed (Y/N): [x]  Yes []  No     Date of Patient follow up with Physician:    -Dr. Sudhir Childress     Progress Report: []  Yes  [x]  No     Date Range for reporting period:  Beginnin21  Ending:      Progress report due (10 Rx/or 30 days whichever is less): visit #10 or  (date)     Recertification due (POC duration/ or 90 days whichever is less): visit #/ or 21(date)     Visit # Insurance Allowable Auth required? Date Range   10/12 + 0 120 combined visits []  Yes  [x]  No pcy     Latex Allergy:  [x]NO      []YES  Preferred Language for Healthcare:   [x]English       []other:    Functional Scale:       Date assessed:  NDI: raw score = 17; dysfunction = 34%   3/29  NDI: raw score = 22; dysfunction = 44%       Pain level:  1/10 -anterior L GH joint    SUBJECTIVE:      Doing good today, pain is minimal in anterior shoulder but still has burning in L upper trap, L biceps at times. Started feeling better about a day after last session.          OBJECTIVE:    Observation:  :  L upper trap compensations w/seated LUE movements, is not present during supine LUE movements   Test measurements:     :  BP R arm 148/105 , L arm 154/96 HR 99   -wrote down for pt to take with her for paraspinals, B upper trap, B suboccipitals  4 mins 4/27: pt declined STM due to incr tenderness to palpation   Cervical distraction      L shoulder PROM into each direction                      Modalities:   4/12:  Supine CP to posterior cervical spine and L shoulder. 4/9: deferred traction, pt plans to assess symptoms throughout the day and determine if traction helped or if massage from family helped her symptoms. Will review and determine appropriateness next session. 4/6:  Mechanical cervical traction 12#/8#, 30\" on / 10\" off, x10 mins w/bolsters. Patient was given panic button as well as instructed how to use it if experiencing pain. Patient was also given bell to call if anything is needed. Patient education:  3/29:  -pt educated on diagnosis, prognosis and expectations for rehab  -all pt questions were answered    Home Exercise Program:     Access Code: [email or web address]  URL: [email or web address]  Date: 04/27/2021  Prepared by: Lui Garay    Exercises  Pec Minor Stretch - 3 x daily - 7 x weekly - 3 sets - 30 hold  Sidelying Shoulder ER with Towel and Dumbbell - 2 x daily - 7 x weekly - 15 reps  Sidelying Shoulder Abduction Palm Forward - 2 x daily - 7 x weekly - 15 reps  Supine Scapular Protraction in Flexion with Dumbbells - 2 x daily - 7 x weekly - 15 reps    Access Code: [email or web address]  URL: [email or web address]  Date: 03/29/2021  Prepared by: Lui Garay     Exercises  Supine Chin Tuck - 2 x daily - 7 x weekly - 10 reps  Seated Scapular Retraction - 2 x daily - 7 x weekly - 10 reps  Supine Shoulder Flexion Extension Full Range AROM - 2 x daily - 7 x weekly - 10 reps  Shoulder External Rotation and Scapular Retraction with Resistance - 2 x daily - 7 x weekly - 10 reps    Therapeutic Exercise and NMR EXR  [x] (38400) Provided verbal/tactile cueing for activities related to strengthening, flexibility, endurance, ROM  for improvements in cervical, postural, scapular, pain, promoting relaxation,  increasing ROM, reducing/eliminating soft tissue swelling/inflammation/restriction, improving soft tissue extensibility and allowing for proper ROM for normal function with self care, reaching, carrying, lifting, house/yardwork, driving/computer work    Charges:  Timed Code Treatment Minutes: 40   Total Treatment Minutes: 40       [] EVAL - LOW (77349)   [] EVAL - MOD (84961)  [] EVAL - HIGH (44428)  [] RE-EVAL (06879)  [x] FS(96039) x  2     [] Ionto  [] NMR (87376) x       [] Vaso  [x] Manual (88508) x 1      [] Ultrasound  [] TA x        [] Mech Traction (36366)  [] Aquatic Therapy x      [] ES (un) (90898):   [] Home Management Training x  [] ES(attended) (81196)   [] Dry Needling 1-2 muscles (72956):  [] Dry Needling 3+ muscles (836103  [] Group:      [] Other:     GOALS:  Patient stated goal: alleviate pain  [] Progressing: [] Met: [] Not Met: [] Adjusted    Therapist goals for Patient:   Short Term Goals: To be achieved in: 2 weeks  1. Independent in HEP and progression per patient tolerance, in order to prevent re-injury. [] Progressing: [x] Met: [] Not Met: [] Adjusted  2. Patient will have a decrease in pain to facilitate improvement in movement, function, and ADLs as indicated by Functional Deficits. [] Progressing: [x] Met: [] Not Met: [] Adjusted    Long Term Goals: To be achieved in: 6 weeks  1. Disability index score of 10% or less for the NDI to assist with reaching prior level of function. [] Progressing: [] Met: [] Not Met: [] Adjusted  2. Patient will perform pain-free & maintain AROM of cervical/thoracic spine  to allow for proper joint functioning as indicated by patients Functional Deficits. [] Progressing: [x] Met: [] Not Met: [] Adjusted  3. Patient will demonstrate an increase in postural awareness and control and activation of deep cervical stabilizers to allow for proper functional mobility as indicated by patients Functional Deficits.    [x] Progressing: discharge from care along with most recent update on progress.

## 2021-05-04 ENCOUNTER — OFFICE VISIT (OUTPATIENT)
Dept: ORTHOPEDIC SURGERY | Age: 51
End: 2021-05-04
Payer: COMMERCIAL

## 2021-05-04 ENCOUNTER — APPOINTMENT (OUTPATIENT)
Dept: PHYSICAL THERAPY | Age: 51
End: 2021-05-04
Payer: COMMERCIAL

## 2021-05-04 VITALS — WEIGHT: 250 LBS | BODY MASS INDEX: 44.3 KG/M2 | HEIGHT: 63 IN

## 2021-05-04 DIAGNOSIS — M50.10 HERNIATION OF CERVICAL INTERVERTEBRAL DISC WITH RADICULOPATHY: Primary | ICD-10-CM

## 2021-05-04 DIAGNOSIS — M54.2 NECK PAIN: ICD-10-CM

## 2021-05-04 PROCEDURE — 99204 OFFICE O/P NEW MOD 45 MIN: CPT | Performed by: ORTHOPAEDIC SURGERY

## 2021-05-04 NOTE — PROGRESS NOTES
New Patient: CERVICAL SPINE    Referring Provider:  Caroline Wellington, *    CHIEF COMPLAINT:    Chief Complaint   Patient presents with    Neck Pain     Patient states that her pain began about 2 months ago. Patient complains of neck pain, left shoulder and left arm pain to her elbow. She is currently in PT and getting some relief. HISTORY OF PRESENT ILLNESS:   Ms. Matty Araiza is a pleasant 48 y.o. old male who presents today for evaluation of neck and left arm pain at the kind suggestion of Dr. Toney Stratton. Symptoms began 2 months ago insidiously. They have increased since that time. Her left arm pain radiates across the deltoid to her elbow. She rates the intensity of her pain 4/10. She notes tingling and weakness of her neck and occasionally her left arm. She denies numbness of her arms, loss of fine motor control and gait abnormality.     Current/Past Treatment:   · Physical Therapy: 6 visits, somewhat helpful  · Chiropractic: None  · Injection: None  · Medications: Naprosyn    Past Medical History:   Past Medical History:   Diagnosis Date    Achilles rupture     repaired    Asthma     very mild, normal PFTs    Hyperlipidemia     Hypertension     Murmur, heart     Obstructive sleep apnea (adult) (pediatric)     Pericarditis     Prolonged emergence from general anesthesia     Pseudotumor cerebri 12/21/2011      Past Surgical History:     Past Surgical History:   Procedure Laterality Date    ACHILLES TENDON SURGERY      COLONOSCOPY  2008    COLONOSCOPY N/A 12/18/2020    COLONOSCOPY POLYPECTOMY SNARE/COLD performed by Juanjo Hurtado MD at [address]  1/2009    OVARY REMOVAL  2019    TONSILLECTOMY  2005    TUBAL LIGATION       Current Medications:     Current Outpatient Medications:     naproxen (NAPROSYN) 500 MG tablet, Take 1 tablet by mouth 2 times daily (with meals), Disp: 60 tablet, Rfl: 1    predniSONE (DELTASONE) 10 MG tablet, 6 tabs day 1&2, 5 tabs day 3&4, 4 tabs day 5&6, 3 tabs day 7&8, 2 tabs day 9&10, 1 tab day 11&12, Disp: 42 tablet, Rfl: 0    acetaZOLAMIDE (DIAMOX) 250 MG tablet, TAKE 1 TABLET TWICE A DAY (KEEP APPOINTMENT FOR FUTURE REFILLS PLEASE), Disp: 180 tablet, Rfl: 0    rosuvastatin (CRESTOR) 20 MG tablet, Take 1 tablet by mouth daily, Disp: 90 tablet, Rfl: 2    estradiol (ESTRACE) 0.1 MG/GM vaginal cream, INSERT 1 GRAM INTO VAGINA EVERY MONDAY AND THURSDAY, Disp: , Rfl:     olmesartan-hydroCHLOROthiazide (BENICAR HCT) 40-25 MG per tablet, Take 1 tablet by mouth daily, Disp: 90 tablet, Rfl: 2    MULTIPLE VITAMIN PO, Take by mouth, Disp: , Rfl:     Omega-3 Fatty Acids (FISH OIL PO), Take by mouth, Disp: , Rfl:     VITAMIN D PO, Take by mouth, Disp: , Rfl:     UNABLE TO FIND, Black Seed Oil, Disp: , Rfl:     cetirizine (ZYRTEC) 10 MG tablet, Take 1 tablet by mouth daily (Patient taking differently: Take 10 mg by mouth daily as needed ), Disp: 30 tablet, Rfl: 5  Allergies:  Lisinopril  Social History:    reports that she has never smoked. She has never used smokeless tobacco. She reports current alcohol use of about 1.0 - 2.0 standard drinks of alcohol per week. She reports that she does not use drugs.   Family History:   Family History   Problem Relation Age of Onset    Cancer Father     Diabetes Father     Heart Failure Father     Hypertension Father     Stroke Father     Migraines Father     Other Father         HANK    Kidney Disease Father     Coronary Art Dis Brother     Other Sister         HANK    Diabetes Sister     Other Mother         fibromyalgia    Osteoporosis Mother     Diabetes Mother     Breast Cancer Mother        REVIEW OF SYSTEMS: Full ROS noted & scanned   CONSTITUTIONAL: Denies unexplained weight loss, fevers, chills or fatigue  NEUROLOGICAL: Denies unsteady gait or progressive weakness  MUSCULOSKELETAL: Denies joint swelling or redness  PSYCHOLOGICAL: Denies anxiety, depression   SKIN: Denies skin changes, delayed healing, rash, itching   HEMATOLOGIC: Denies easy bleeding or bruising  ENDOCRINE: Denies excessive thirst, urination, heat/cold  RESPIRATORY: Denies current dyspnea, cough  GI: Denies nausea, vomiting, diarrhea   : Denies bowel or bladder issues       PHYSICAL EXAM:    Vitals: Height 5' 3\" (1.6 m), weight 250 lb (113.4 kg), not currently breastfeeding. GENERAL EXAM:  · General Apparence: Patient is adequately groomed with no evidence of malnutrition. · Orientation: The patient is oriented to time, place and person. · Mood & Affect:The patient's mood and affect are appropriate   · Vascular: Examination reveals no swelling tenderness in upper or lower extremities. Good capillary refill  · Lymphatic: The lymphatic examination bilaterally reveals all areas to be without enlargement or induration  · Sensation: Sensation is intact without deficit  · Coordination/Balance: Good coordination     CERVICAL EXAMINATION:  · Inspection: Local inspection shows no step-off or bruising. Cervical alignment is normal.     · Palpation: No evidence of tenderness at the midline, and trapezius. Paraspinal tenderness is present. There is no step-off or paraspinal spasm. · Range of Motion: Cervical flexion, extension, and rotation are mildly reduced with pain. · Strength: 4/5 of her external rotators about her left shoulder, otherwise 5/5 bilateral upper extremities   · Special Tests:    ·  Blanton's negative bilaterally. ·      Cubital and Carpal tunnel Tinel's negative bilaterally. · Skin:There are no rashes, ulcerations or lesions in right & left upper extremities. · Reflexes: Bilaterally triceps, biceps and brachioradialis are 2+. Clonus absent bilaterally at the feet. · Gait & station: normal, patient ambulates without assistance     · Additional Examinations:       · RIGHT UPPER EXTREMITY:  Inspection/examination of the right upper extremity does not show any tenderness, deformity or injury. Range of motion is unremarkable. There is no gross instability. There are no rashes, ulcerations or lesions. Strength and tone are normal.  · LEFT UPPER EXTREMITY: Inspection/examination of the left upper extremity does not show any tenderness, deformity or injury. Range of motion is unremarkable. There is no gross instability. There are no rashes, ulcerations or lesions. Strength and tone are normal.    Diagnostic Testing:  I reviewed AP and lateral x-rays of her cervical spine were obtained in the office today. Those show minimal degenerative changes    Impression:   Cervical disc herniation with radiculopathy    Plan:    Discussed treatment options including observation, physical therapy, medications, epidural injections and additional imaging. She would like to proceed with a home exercise program.  She may call for a second course of oral steroids.

## 2021-05-05 ENCOUNTER — HOSPITAL ENCOUNTER (OUTPATIENT)
Dept: PHYSICAL THERAPY | Age: 51
Setting detail: THERAPIES SERIES
Discharge: HOME | End: 2021-05-05
Payer: COMMERCIAL

## 2021-05-05 PROCEDURE — 97140 MANUAL THERAPY 1/> REGIONS: CPT

## 2021-05-05 PROCEDURE — 97110 THERAPEUTIC EXERCISES: CPT

## 2021-05-05 NOTE — FLOWSHEET NOTE
[facility]  Phone: [phone]   Fax: [phone]      Physical Therapy Treatment Note/ Progress Report:     Date:  2021    Patient Name:  Tatyana Che    :  1970  MRN: [id number]  Restrictions/Precautions:    Medical/Treatment Diagnosis Information:  · Diagnosis: M54.12 (ICD-10-CM) - Cervical radicular pain  · Treatment Diagnosis: Misalinged cervicothoracic spine with decreased stability, Pain w/cervical extension, Decreased scapular stability  Insurance/Certification information:  PT Insurance Information: [employer]. 120 combined visits  Physician Information:  Referring Practitioner: Janace Epley, MD  Plan of care signed (Y/N): [x]  Yes []  No     Date of Patient follow up with Physician:         Progress Report: []  Yes  [x]  No     Date Range for reporting period:  Beginnin21  Ending:      Progress report due (10 Rx/or 30 days whichever is less): visit #10 or  (date)     Recertification due (POC duration/ or 90 days whichever is less): visit # or 21(date)     Visit # Insurance Allowable Auth required? Date Range    +  120 combined visits []  Yes  [x]  No pcy     Latex Allergy:  [x]NO      []YES  Preferred Language for Healthcare:   [x]English       []other:    Functional Scale:       Date assessed:  NDI: raw score = 17; dysfunction = 34%   3/29  NDI: raw score = 22; dysfunction = 44%       Pain level:  1/10 -anterior L GH joint    SUBJECTIVE:      Patient reports pain is feeling better today, still present in L ant shoulder and upper trap. Lateral arm and elbow pain is insignificant. Had apt w/Dr. Flakita Gilbert yesterday, states she can receive cortisone injections if she wants, surgery was discussed but not recommended, pt reported she wants to continue w/PT as she has had improvement in symptoms, MD agreed.   Was encouraged to purchase a traction machine and is looking at them on [employer], hasn't yet got one. OBJECTIVE:    Observation:  4/27:  L upper trap compensations w/seated LUE movements, is not present during supine LUE movements   Test measurements:     4/13:  BP R arm 148/105 , L arm 154/96 HR 99   -wrote down for pt to take with her for PCP  4/20:  Pt 11 mins late    4/27:  CERV ROM       Cervical Flexion WNL     Cervical Extension WFL     Cervical SB WFL  -L UT stretch w/ R SB   Cervical rotation WFL      Strength / Myotomes Left   Shoulder Flex 4-   Shoulder Scap 4-    Shoulder ER 3+   Shoulder IR  4-         RESTRICTIONS/PRECAUTIONS:  HTN, pseudotumor cerebri    Exercises/Interventions:   Therapeutic Exercise (87851) Resistance / level Sets/sec Reps Notes   UBE  -fwd / bwd  2'/2'     Pulley's  -flex & abd  Unilateral pec stretch at wall      -flex caused some discomfort in L scapula   Cerv & scap stabs at wall  Chin tucks  Cervical SB  Cervical rotation  B scap retract  W's  W to Y's  B shoulder flex/scap w/chin tuck  B shoulder abd w/chin tuck  2-3\" hold15  15 B   Resistive bands  Seated B GH ER w/scap retract  Mid row  GH ext  GH ER  Robber pose   Orange  Lime  Lime  orange  Orange     Seated stretches              Mat table  Shoulder flex  Serratus punch    S/L shld abd  S/L  GH ER  Sleeper stretch   2#  2#    2#  2#             3x20\" L   20 L  20 L    20 L  20 L                  Therapeutic Activities (11911)       3/29:  Discussed improved cervicothoracic spine posture, especially when seated for work and driving. 4/9:  Discussed improved posture while driving and reducing dependency on LUE as she places hand on top of wheel.                                      NMR re-education (64579)       Mat table  Supine  Chin tucks  scap retract  Shoulder flexion    Seated median nerve glides, wrist flex & ext    PEACE scap retract & protract                                  -unable to maintain good shld abd & UT compensations developed   Prone Hughston's   -T's  -Y's Manual Intervention (07591)    5/5:  **blue bolster placed perpendicular to spine at T1/C7 level throughout manual   Cerv mobs R rot mobs to C5  P/A mobs grade II-III C4-C7  3 mins     ULNT:  Median glides  -elbow flex & ext  -wrist flex & ext  Radial glides  Ulnar glides           Supine stretches:  Upper trap  SCM  B pec minor stretch             SOR  2 mins    STM B cervical paraspinals, B upper trap, B suboccipitals  4 mins 4/27: pt declined STM due to incr tenderness to palpation   Cervical distraction  5\" hold x3 mins    L shoulder PROM into each direction                      Modalities:   4/12:  Supine CP to posterior cervical spine and L shoulder. 4/9: deferred traction, pt plans to assess symptoms throughout the day and determine if traction helped or if massage from family helped her symptoms. Will review and determine appropriateness next session. 4/6:  Mechanical cervical traction 12#/8#, 30\" on / 10\" off, x10 mins w/bolsters. Patient was given panic button as well as instructed how to use it if experiencing pain. Patient was also given bell to call if anything is needed. Patient education:  3/29:  -pt educated on diagnosis, prognosis and expectations for rehab  -all pt questions were answered    Home Exercise Program:     Access Code: [email or web address]  URL: [email or web address]  Date: 04/27/2021  Prepared by: Rosibel Howe    Exercises  Pec Minor Stretch - 3 x daily - 7 x weekly - 3 sets - 30 hold  Sidelying Shoulder ER with Towel and Dumbbell - 2 x daily - 7 x weekly - 15 reps  Sidelying Shoulder Abduction Palm Forward - 2 x daily - 7 x weekly - 15 reps  Supine Scapular Protraction in Flexion with Dumbbells - 2 x daily - 7 x weekly - 15 reps    Access Code: [email or web address]  URL: [email or web address]  Date: 03/29/2021  Prepared by: Rosibel Howe     Exercises  Supine Chin Tuck - 2 x daily - 7 x weekly - 10 reps  Seated Scapular Retraction - 2 x daily - 7 x weekly - 10 reps  Supine Shoulder Flexion Extension Full Range AROM - 2 x daily - 7 x weekly - 10 reps  Shoulder External Rotation and Scapular Retraction with Resistance - 2 x daily - 7 x weekly - 10 reps    Therapeutic Exercise and NMR EXR  [x] (33663) Provided verbal/tactile cueing for activities related to strengthening, flexibility, endurance, ROM  for improvements in cervical, postural, scapular, scapulothoracic and UE control with self care, reaching, carrying, lifting, house/yardwork, driving/computer work.    [] (33284) Provided verbal/tactile cueing for activities related to improving balance, coordination, kinesthetic sense, posture, motor skill, proprioception  to assist with cervical, scapular, scapulothoracic and UE control with self care, reaching, carrying, lifting, house/yardwork, driving/computer work.  [] (38030) Therapist is in constant attendance of 2 or more patients providing skilled therapy interventions, but not providing any significant amount of measurable one-on-one time to either patient, for improvements in cervical, scapular, scapulothoracic and UE control with self care, reaching, carrying, lifting, house/yardwork, driving, computer work. Therapeutic Activities:    [x] (51559 or 10736) Provided verbal/tactile cueing for activities related to improving balance, coordination, kinesthetic sense, posture, motor skill, proprioception and motor activation to allow for proper function of cervical, scapular, scapulothoracic and UE control with self care, carrying, lifting, driving/computer work.      Home Exercise Program:    [x] (96905) Reviewed/Progressed HEP activities related to strengthening, flexibility, endurance, ROM of cervical, scapular, scapulothoracic and UE control with self care, reaching, carrying, lifting, house/yardwork, driving/computer work  [] (68920) Reviewed/Progressed HEP activities related to improving balance, coordination, kinesthetic sense, posture, motor skill, proprioception of cervical, scapular, scapulothoracic and UE control with self care, reaching, carrying, lifting, house/yardwork, driving/computer work      Manual Treatments:  PROM / STM / Oscillations-Mobs:  G-I, II, III, IV (PA's, Inf., Post.)  [x] (62530) Provided manual therapy to mobilize soft tissue/joints of cervical/CT, scapular GHJ and UE for the purpose of decreasing headache, modulating pain, promoting relaxation,  increasing ROM, reducing/eliminating soft tissue swelling/inflammation/restriction, improving soft tissue extensibility and allowing for proper ROM for normal function with self care, reaching, carrying, lifting, house/yardwork, driving/computer work    Charges:  Timed Code Treatment Minutes: 43   Total Treatment Minutes: 43       [] EVAL - LOW (48723)   [] EVAL - MOD (98698)  [] EVAL - HIGH (73065)  [] RE-EVAL (59988)  [x] PG(02927) x  2     [] Ionto  [] NMR (40757) x       [] Vaso  [x] Manual (69875) x 1      [] Ultrasound  [] TA x        [] Mech Traction (69811)  [] Aquatic Therapy x      [] ES (un) (03300):   [] Home Management Training x  [] ES(attended) (90783)   [] Dry Needling 1-2 muscles (59806):  [] Dry Needling 3+ muscles (197010  [] Group:      [] Other:     GOALS:  Patient stated goal: alleviate pain  [] Progressing: [] Met: [] Not Met: [] Adjusted    Therapist goals for Patient:   Short Term Goals: To be achieved in: 2 weeks  1. Independent in HEP and progression per patient tolerance, in order to prevent re-injury. [] Progressing: [x] Met: [] Not Met: [] Adjusted  2. Patient will have a decrease in pain to facilitate improvement in movement, function, and ADLs as indicated by Functional Deficits. [] Progressing: [x] Met: [] Not Met: [] Adjusted    Long Term Goals: To be achieved in: 6 weeks  1. Disability index score of 10% or less for the NDI to assist with reaching prior level of function. [] Progressing: [] Met: [] Not Met: [] Adjusted  2.  Patient will perform pain-free & maintain AROM of cervical/thoracic spine  to allow for proper joint functioning as indicated by patients Functional Deficits. [] Progressing: [x] Met: [] Not Met: [] Adjusted  3. Patient will demonstrate an increase in postural awareness and control and activation of deep cervical stabilizers to allow for proper functional mobility as indicated by patients Functional Deficits. [x] Progressing: [x] Met: [] Not Met: [] Adjusted  4. Patient will return to functional activities including sleeping without increased symptoms or restriction. [x] Progressing: [] Met: [] Not Met: [] Adjusted  5. Patient to demonstrate L shoulder complex strength of 4+/5 throughout in order to reach [place] & lift objects pain-free. [x] Progressing: [] Met: [] Not Met: [] Adjusted       Overall Progression Towards Functional goals/ Treatment Progress Update:  [] Patient is progressing as expected towards functional goals listed. [] Progression is slowed due to complexities/Impairments listed. [] Progression has been slowed due to co-morbidities. [x] Plan just implemented, too soon to assess goals progression <30days   [] Goals require adjustment due to lack of progress  [] Patient is not progressing as expected and requires additional follow up with physician  [] Other    Persisting Functional Limitations/Impairments:  []Sitting []Standing   []Walking []Squatting/bending    []Stairs []ADL's    []Transfers [x]Reaching  []Housework []Lifting  []Driving [x]Job related tasks  []Sports/Recreation  []Sleeping  []Other:    ASSESSMENT:      Patient continues to progress symptoms form visit to visit, however remains hypersensitive to palpation anterior L GH joint, question neural or structural involvement. Pt reported pressure at base of cerv spine with blue bolster at T1/C7 level and felt it helped to improve cervical posture.   Continue to progress cerv & scap stabs as able, as tenderness improves, address L GH joint.    Treatment/Activity Tolerance:  [x] Patient able to complete tx   [] Patient limited by fatique  [] Patient limited by pain   [] Patient limited by other medical complications  [] Other:     Prognosis: [x] Good [] Fair  [] Poor    Patient Requires Follow-up: [x] Yes  [] No    PLAN: See eval. PT 2x / week for 6 weeks. [x] Continue per plan of care [] Alter current plan (see comments)  [] Plan of care initiated [] Hold pending MD visit [] Discharge    Electronically signed by: Nafisa Uribe PT       Note: If patient does not return for scheduled/ recommended follow up visits, this note will serve as a discharge from care along with most recent update on progress.

## 2021-05-07 ENCOUNTER — HOSPITAL ENCOUNTER (OUTPATIENT)
Dept: PHYSICAL THERAPY | Age: 51
Setting detail: THERAPIES SERIES
Discharge: HOME | End: 2021-05-07
Payer: COMMERCIAL

## 2021-05-07 PROCEDURE — 97530 THERAPEUTIC ACTIVITIES: CPT | Performed by: SPECIALIST/TECHNOLOGIST

## 2021-05-07 PROCEDURE — 97110 THERAPEUTIC EXERCISES: CPT | Performed by: SPECIALIST/TECHNOLOGIST

## 2021-05-07 PROCEDURE — 97140 MANUAL THERAPY 1/> REGIONS: CPT | Performed by: SPECIALIST/TECHNOLOGIST

## 2021-05-07 NOTE — FLOWSHEET NOTE
Karan Lutz  Phone: [phone]   Fax: [phone]      Physical Therapy Treatment Note/ Progress Report:     Date:  2021    Patient Name:  Cleveland Levy    :  1970  MRN: [id number]  Restrictions/Precautions:    Medical/Treatment Diagnosis Information:  · Diagnosis: M54.12 (ICD-10-CM) - Cervical radicular pain  · Treatment Diagnosis: Misalinged cervicothoracic spine with decreased stability, Pain w/cervical extension, Decreased scapular stability  Insurance/Certification information:  PT Insurance Information: [employer]. 120 combined visits  Physician Information:  Referring Practitioner: Rosemarie Bobby MD  Plan of care signed (Y/N): [x]  Yes []  No     Date of Patient follow up with Physician:         Progress Report: []  Yes  [x]  No     Date Range for reporting period:  Beginnin21  Ending:      Progress report due (10 Rx/or 30 days whichever is less): visit #10 or  (date)     Recertification due (POC duration/ or 90 days whichever is less): visit # or 21(date)     Visit # Insurance Allowable Auth required? Date Range    + 0 120 combined visits []  Yes  [x]  No pcy     Latex Allergy:  [x]NO      []YES  Preferred Language for Healthcare:   [x]English       []other:    Functional Scale:       Date assessed:  NDI: raw score = 17; dysfunction = 34%   3/29  NDI: raw score = 22; dysfunction = 44%       Pain level:  1/10 -anterior L GH joint    SUBJECTIVE:   Aching  Symptoms down left arm posterior/ lateral arm etc.    Pt took muscle relaxer and and pain pill this am. Admits to driving all day yesterday which could be why her left arm is more symptomatic today. Felt pretty good yesterday, denies any issues prior to driving.        OBJECTIVE:    Observation:  :  L upper trap compensations w/seated LUE movements, is not present during supine LUE movements   Test measurements:     :  BP R arm 148/105 , L arm 154/96 HR 99   -wrote down for pt to take with her for PCP  4/20:  Pt 11 mins late    4/27:  CERV ROM       Cervical Flexion WNL     Cervical Extension WFL     Cervical SB WFL  -L UT stretch w/ R SB   Cervical rotation WFL      Strength / Myotomes Left   Shoulder Flex 4-   Shoulder Scap 4-    Shoulder ER 3+   Shoulder IR  4-         RESTRICTIONS/PRECAUTIONS:  HTN, pseudotumor cerebri    Exercises/Interventions:   Therapeutic Exercise (73928) Resistance / level Sets/sec Reps Notes   UBE  -fwd / bwd  2'/2'     Pulley's  -flex & abd  Unilateral pec stretch at wall      -flex caused some discomfort in L scapula   Cerv & scap stabs at wall  Chin tucks  Cervical SB  Cervical rotation  B scap retract  W's  W to Y's  B shoulder flex/scap w/chin tuck  B shoulder abd w/chin tuck  2-3\" hold15  15 B   Resistive bands  Seated B GH ER w/scap retract/ No money  Mid row  GH ext  [address] ER  Robber pose  High Row     Lime  Lime  Lime  lime  lime     Lime   20x  20x  20x  20x20x    20x              Added 5/7   Seated stretches       Prone series  NPV ?       Mat table  supine  Shoulder flex  Serratus punch    S/L shld abd   SL punch  x15  S/L  GH ER  Sleeper stretch    2#  2#    2#  2#  2#               3x20\" L   20 L  20 L    20 L  20 L               HEP                 Therapeutic Activities (23070)             5/7:  Discussed improved posture while driving and reducing dependency on LUE as she places hand on top of wheel, work station set up                                    [employer] re-education (59742)       Mat table  Supine  Chin tucks  HEP  scap retract  HEP  Shoulder flexion    Seated median nerve glides, wrist flex & ext    PEACE scap retract & protract                                  -unable to maintain good shld abd & UT compensations developed   Prone Klever's   -T's  -Y's                              Manual Intervention (23280)  18'    5/5:  **blue bolster placed perpendicular to spine at T1/C7 level throughout manual   Cerv mobs R rot mobs to C5  P/A mobs grade II-III C4-C7  3 mins     ULNT:  Median glides  -elbow flex & ext  -wrist flex & ext  Radial glides  Ulnar glides           Supine stretches:  Upper trap  SCM  B pec minor stretch             SOR  2 mins    STM B cervical paraspinals, B upper trap, B suboccipitals  12'mins    Cervical distraction  5\" hold x3 mins    L shoulder PROM into each direction                      Modalities:   4/12:  Supine CP to posterior cervical spine and L shoulder. 4/9: deferred traction, pt plans to assess symptoms throughout the day and determine if traction helped or if massage from family helped her symptoms. Will review and determine appropriateness next session. 4/6:  Mechanical cervical traction 12#/8#, 30\" on / 10\" off, x10 mins w/bolsters. Patient was given panic button as well as instructed how to use it if experiencing pain. Patient was also given bell to call if anything is needed. Patient education:  3/29:  -pt educated on diagnosis, prognosis and expectations for rehab  -all pt questions were answered    Home Exercise Program:     Access Code: [email or web address]  URL: [email or web address]  Date: 04/27/2021  Prepared by: Christina Lockhart    Exercises  Pec Minor Stretch - 3 x daily - 7 x weekly - 3 sets - 30 hold  Sidelying Shoulder ER with Towel and Dumbbell - 2 x daily - 7 x weekly - 15 reps  Sidelying Shoulder Abduction Palm Forward - 2 x daily - 7 x weekly - 15 reps  Supine Scapular Protraction in Flexion with Dumbbells - 2 x daily - 7 x weekly - 15 reps    Access Code: [email or web address]  URL: [email or web address]  Date: 03/29/2021  Prepared by: Christina Lockhart     Exercises  Supine Chin Tuck - 2 x daily - 7 x weekly - 10 reps  Seated Scapular Retraction - 2 x daily - 7 x weekly - 10 reps  Supine Shoulder Flexion Extension Full Range AROM - 2 x daily - 7 x weekly - 10 reps  Shoulder External Rotation and Scapular Retraction with Resistance - 2 x daily - 7 x weekly - 10 reps    Therapeutic Exercise and NMR EXR  [x] (37518) Provided verbal/tactile cueing for activities related to strengthening, flexibility, endurance, ROM  for improvements in cervical, postural, scapular, scapulothoracic and UE control with self care, reaching, carrying, lifting, house/yardwork, driving/computer work.    [] (56788) Provided verbal/tactile cueing for activities related to improving balance, coordination, kinesthetic sense, posture, motor skill, proprioception  to assist with cervical, scapular, scapulothoracic and UE control with self care, reaching, carrying, lifting, house/yardwork, driving/computer work.  [] (87328) Therapist is in constant attendance of 2 or more patients providing skilled therapy interventions, but not providing any significant amount of measurable one-on-one time to either patient, for improvements in cervical, scapular, scapulothoracic and UE control with self care, reaching, carrying, lifting, house/yardwork, driving, computer work. Therapeutic Activities:    [x] (27013 or 02603) Provided verbal/tactile cueing for activities related to improving balance, coordination, kinesthetic sense, posture, motor skill, proprioception and motor activation to allow for proper function of cervical, scapular, scapulothoracic and UE control with self care, carrying, lifting, driving/computer work.      Home Exercise Program:    [x] (44910) Reviewed/Progressed HEP activities related to strengthening, flexibility, endurance, ROM of cervical, scapular, scapulothoracic and UE control with self care, reaching, carrying, lifting, house/yardwork, driving/computer work  [] (74598) Reviewed/Progressed HEP activities related to improving balance, coordination, kinesthetic sense, posture, motor skill, proprioception of cervical, scapular, scapulothoracic and UE control with self care, reaching, carrying, lifting, house/yardwork, driving/computer work      Manual Treatments: PROM / STM / Oscillations-Mobs:  G-I, II, III, IV (PA's, Inf., Post.)  [x] (77412) Provided manual therapy to mobilize soft tissue/joints of cervical/CT, scapular GHJ and UE for the purpose of decreasing headache, modulating pain, promoting relaxation,  increasing ROM, reducing/eliminating soft tissue swelling/inflammation/restriction, improving soft tissue extensibility and allowing for proper ROM for normal function with self care, reaching, carrying, lifting, house/yardwork, driving/computer work    Charges:  Timed Code Treatment Minutes: 43   Total Treatment Minutes: 43       [] EVAL - LOW (81399)   [] EVAL - MOD (90516)  [] EVAL - HIGH (29794)  [] RE-EVAL (45990)  [x] NG(01656) x  1    [] Ionto  [] NMR (26245) x       [] Vaso  [x] Manual (00898) x 1      [] Ultrasound  [x] TA x1        [] Mech Traction (82201)  [] Aquatic Therapy x      [] ES (un) (02881):   [] Home Management Training x  [] ES(attended) (74162)   [] Dry Needling 1-2 muscles (68889):  [] Dry Needling 3+ muscles (049305  [] Group:      [] Other:     GOALS:  Patient stated goal: alleviate pain  [] Progressing: [] Met: [] Not Met: [] Adjusted    Therapist goals for Patient:   Short Term Goals: To be achieved in: 2 weeks  1. Independent in HEP and progression per patient tolerance, in order to prevent re-injury. [] Progressing: [x] Met: [] Not Met: [] Adjusted  2. Patient will have a decrease in pain to facilitate improvement in movement, function, and ADLs as indicated by Functional Deficits. [] Progressing: [x] Met: [] Not Met: [] Adjusted    Long Term Goals: To be achieved in: 6 weeks  1. Disability index score of 10% or less for the NDI to assist with reaching prior level of function. [] Progressing: [] Met: [] Not Met: [] Adjusted  2. Patient will perform pain-free & maintain AROM of cervical/thoracic spine  to allow for proper joint functioning as indicated by patients Functional Deficits.    [] Progressing: [x] Met: [] Not Met: [] Adjusted  3. Patient will demonstrate an increase in postural awareness and control and activation of deep cervical stabilizers to allow for proper functional mobility as indicated by patients Functional Deficits. [x] Progressing: [x] Met: [] Not Met: [] Adjusted  4. Patient will return to functional activities including sleeping without increased symptoms or restriction. [x] Progressing: [] Met: [] Not Met: [] Adjusted  5. Patient to demonstrate L shoulder complex strength of 4+/5 throughout in order to reach [address] & lift objects pain-free. [x] Progressing: [] Met: [] Not Met: [] Adjusted       Overall Progression Towards Functional goals/ Treatment Progress Update:  [] Patient is progressing as expected towards functional goals listed. [] Progression is slowed due to complexities/Impairments listed. [] Progression has been slowed due to co-morbidities. [x] Plan just implemented, too soon to assess goals progression <30days   [] Goals require adjustment due to lack of progress  [] Patient is not progressing as expected and requires additional follow up with physician  [] Other    Persisting Functional Limitations/Impairments:  []Sitting []Standing   []Walking []Squatting/bending    []Stairs []ADL's    []Transfers [x]Reaching  []Housework []Lifting  []Driving [x]Job related tasks  []Sports/Recreation  []Sleeping  []Other:    ASSESSMENT:      Patient continues to progress symptoms form visit to visit and making progress with RTC/ posterior shoulder stability exercises. Pt reported increased fatigue w/ lactic acid/ burning  W/ TB and  Left shoulder pre's. Had brief moment of increased numbness down left posterior arm into the left hand. Pt tolerated progressions of high rows/ sl punches, and ER bilaterally with no moneys. Continue to progress cerv & scap stabs as able, as tenderness improves, address L GH joint. Pt admits to getting weekly massages which helps some trigger points in left side of neck/ LT. shoulder. Pt Is left handed and uses her left do also perform most driving and ADL's    Treatment/Activity Tolerance:  [x] Patient able to complete tx   [] Patient limited by fatique  [] Patient limited by pain   [] Patient limited by other medical complications  [] Other:     Prognosis: [x] Good [] Fair  [] Poor    Patient Requires Follow-up: [x] Yes  [] No    PLAN: See eval. PT 2x / week for 6 weeks. [x] Continue per plan of care [] Alter current plan (see comments)  [] Plan of care initiated [] Hold pending MD visit [] Discharge    Electronically signed by: Eden Ac, PTA, [id number]       Note: If patient does not return for scheduled/ recommended follow up visits, this note will serve as a discharge from care along with most recent update on progress.

## 2021-05-19 ENCOUNTER — HOSPITAL ENCOUNTER (OUTPATIENT)
Dept: PHYSICAL THERAPY | Age: 51
Setting detail: THERAPIES SERIES
Discharge: HOME | End: 2021-05-19
Payer: COMMERCIAL

## 2021-05-19 PROCEDURE — 97110 THERAPEUTIC EXERCISES: CPT

## 2021-05-19 PROCEDURE — 97530 THERAPEUTIC ACTIVITIES: CPT

## 2021-05-19 NOTE — FLOWSHEET NOTE
Karan Lutz  Phone: [phone]   Fax: [phone]      Physical Therapy Treatment Note/ Progress Report:     Date:  2021    Patient Name:  Diana Bingham    :  1970  MRN: [id number]  Restrictions/Precautions:    Medical/Treatment Diagnosis Information:  · Diagnosis: M54.12 (ICD-10-CM) - Cervical radicular pain  · Treatment Diagnosis: Misalinged cervicothoracic spine with decreased stability, Pain w/cervical extension, Decreased scapular stability  Insurance/Certification information:  PT Insurance Information: [employer]. 120 combined visits  Physician Information:  Referring Practitioner: Priyank Senior MD  Plan of care signed (Y/N): [x]  Yes []  No     Date of Patient follow up with Physician:         Progress Report: []  Yes  [x]  No     Date Range for reporting period:  Beginnin21  Ending:      Progress report due (10 Rx/or 30 days whichever is less): visit #10 or 3/53/71 (date)     Recertification due (POC duration/ or 90 days whichever is less): visit # or 21(date)     Visit # Insurance Allowable Auth required? Date Range    +  120 combined visits []  Yes  [x]  No pcy     Latex Allergy:  [x]NO      []YES  Preferred Language for Healthcare:   [x]English       []other:    Functional Scale:       Date assessed:  NDI: raw score = 17; dysfunction = 34%   3/29  NDI: raw score = 22; dysfunction = 44%       Pain level:  1/10 -anterior L GH joint    SUBJECTIVE:    Feels that she has been getting better, neck pain is just about gone and now primarily has anterior L shoulder pain and lateral delt pain. Also reporting R shoulder pain that developed after last session, states she could feel her shoulder pop while she did some of the exercises.         OBJECTIVE:    Observation:  :  L upper trap compensations w/seated LUE movements, is not present during supine LUE movements   Test measurements:  4/13:  BP R arm 148/105 , L arm 154/96 HR 99   -wrote down for pt to take with her for PCP  4/20:  Pt 11 mins late    4/27:  CERV ROM       Cervical Flexion WNL     Cervical Extension WFL     Cervical SB WFL  -L UT stretch w/ R SB   Cervical rotation WFL      Strength / Myotomes Left   Shoulder Flex 4-   Shoulder Scap 4-    Shoulder ER 3+   Shoulder IR  4-         RESTRICTIONS/PRECAUTIONS:  HTN, pseudotumor cerebri    Exercises/Interventions:   Therapeutic Exercise (22792) Resistance / level Sets/sec Reps Notes   UBE  -fwd / bwd  2'/2'     Pulley's  -flex & abd  Unilateral pec stretch at wall     x20 ea    Cerv & scap stabs at wall  Chin tucks  Cervical SB  Cervical rotation  B scap retract  W's -back against wall  W to Y's  B shoulder flex/scap w/chin tuck  B shoulder abd w/chin tuck    Wall slides w/lift off  Wall slides  2-3\" hold2\"15  15 B10    20 L  15 R   Resistive bands  Seated B GH ER w/scap retract/ No money  Mid row  GH ext  [address] ER  Robber pose  High Row     Lime  Lime  Lime  lime  lime     Lime                 Added 5/7   Seated stretches       Prone series  NPV ?       Mat table  supine  Shoulder flex  Serratus punch    S/L shld abd   SL punch   S/L  GH ER  Sleeper stretch     Wand:  Flex  Chest press  [address] ER/IR                  4x20\" B       20 B    20 B        15  15  15 R / L -resume weight next visit as pain is controlled                               Therapeutic Activities (48194)             5/7:  Discussed improved posture while driving and reducing dependency on LUE as she places hand on top of wheel, work station set up                                    [employer] re-education (06899)       Mat table  Supine  Chin tucks  HEP  scap retract  HEP  Shoulder flexion    Seated median nerve glides, wrist flex & ext    PEACE scap retract & protract                                  -unable to maintain good shld abd & UT compensations developed   Prone Klever's   -T's  -Y's Manual Intervention ([phone])      5/5:  **blue bolster placed perpendicular to spine at T1/C7 level throughout manual   Cerv mobs R rot mobs to C5  P/A mobs grade II-III C4-C7       ULNT:  Median glides  -elbow flex & ext  -wrist flex & ext  Radial glides  Ulnar glides           Supine stretches:  Upper trap  SCM  B pec minor stretch        3x30\"     SOR      STM B cervical paraspinals, B upper trap, B suboccipitals      Cervical distraction      L shoulder PROM into each direction                      Modalities:   4/12:  Supine CP to posterior cervical spine and L shoulder. 4/9: deferred traction, pt plans to assess symptoms throughout the day and determine if traction helped or if massage from family helped her symptoms. Will review and determine appropriateness next session. 4/6:  Mechanical cervical traction 12#/8#, 30\" on / 10\" off, x10 mins w/bolsters. Patient was given panic button as well as instructed how to use it if experiencing pain. Patient was also given bell to call if anything is needed. Patient education:  3/29:  -pt educated on diagnosis, prognosis and expectations for rehab  -all pt questions were answered    Home Exercise Program:     Access Code: [email or web address]  URL: [email or web address]  Date: 04/27/2021  Prepared by: Dipak Farfan    Exercises  Pec Minor Stretch - 3 x daily - 7 x weekly - 3 sets - 30 hold  Sidelying Shoulder ER with Towel and Dumbbell - 2 x daily - 7 x weekly - 15 reps  Sidelying Shoulder Abduction Palm Forward - 2 x daily - 7 x weekly - 15 reps  Supine Scapular Protraction in Flexion with Dumbbells - 2 x daily - 7 x weekly - 15 reps    Access Code: [email or web address]  URL: [email or web address]  Date: 03/29/2021  Prepared by: Dipak Farfan     Exercises  Supine Chin Tuck - 2 x daily - 7 x weekly - 10 reps  Seated Scapular Retraction - 2 x daily - 7 x weekly - 10 reps  Supine Shoulder Flexion Extension Full Range AROM - 2 x daily - 7 x weekly - 10 reps  Shoulder External Rotation and Scapular Retraction with Resistance - 2 x daily - 7 x weekly - 10 reps    Therapeutic Exercise and NMR EXR  [x] (03406) Provided verbal/tactile cueing for activities related to strengthening, flexibility, endurance, ROM  for improvements in cervical, postural, scapular, scapulothoracic and UE control with self care, reaching, carrying, lifting, house/yardwork, driving/computer work.    [] (18911) Provided verbal/tactile cueing for activities related to improving balance, coordination, kinesthetic sense, posture, motor skill, proprioception  to assist with cervical, scapular, scapulothoracic and UE control with self care, reaching, carrying, lifting, house/yardwork, driving/computer work.  [] (38419) Therapist is in constant attendance of 2 or more patients providing skilled therapy interventions, but not providing any significant amount of measurable one-on-one time to either patient, for improvements in cervical, scapular, scapulothoracic and UE control with self care, reaching, carrying, lifting, house/yardwork, driving, computer work. Therapeutic Activities:    [x] (07087 or 93838) Provided verbal/tactile cueing for activities related to improving balance, coordination, kinesthetic sense, posture, motor skill, proprioception and motor activation to allow for proper function of cervical, scapular, scapulothoracic and UE control with self care, carrying, lifting, driving/computer work.      Home Exercise Program:    [x] (04842) Reviewed/Progressed HEP activities related to strengthening, flexibility, endurance, ROM of cervical, scapular, scapulothoracic and UE control with self care, reaching, carrying, lifting, house/yardwork, driving/computer work  [] (81441) Reviewed/Progressed HEP activities related to improving balance, coordination, kinesthetic sense, posture, motor skill, proprioception of cervical, scapular, scapulothoracic and UE control with self care, reaching, carrying, lifting, house/yardwork, driving/computer work      Manual Treatments:  PROM / STM / Oscillations-Mobs:  G-I, II, III, IV (PA's, Inf., Post.)  [x] (50408) Provided manual therapy to mobilize soft tissue/joints of cervical/CT, scapular GHJ and UE for the purpose of decreasing headache, modulating pain, promoting relaxation,  increasing ROM, reducing/eliminating soft tissue swelling/inflammation/restriction, improving soft tissue extensibility and allowing for proper ROM for normal function with self care, reaching, carrying, lifting, house/yardwork, driving/computer work    Charges:  Timed Code Treatment Minutes: 46   Total Treatment Minutes: 46       [] EVAL - LOW (05010)   [] EVAL - MOD (84427)  [] EVAL - HIGH (04067)  [] RE-EVAL (96897)  [x] XN(46800) x  2    [] Ionto  [x] NMR (17578) x       [] Vaso  [] Manual (14975) x       [] Ultrasound  [x] TA x   1     [] Mech Traction (08886)  [] Aquatic Therapy x      [] ES (un) (90870):   [] Home Management Training x  [] ES(attended) (69345)   [] Dry Needling 1-2 muscles (66983):  [] Dry Needling 3+ muscles (624659  [] Group:      [] Other:     GOALS:  Patient stated goal: alleviate pain  [] Progressing: [] Met: [] Not Met: [] Adjusted    Therapist goals for Patient:   Short Term Goals: To be achieved in: 2 weeks  1. Independent in HEP and progression per patient tolerance, in order to prevent re-injury. [] Progressing: [x] Met: [] Not Met: [] Adjusted  2. Patient will have a decrease in pain to facilitate improvement in movement, function, and ADLs as indicated by Functional Deficits. [] Progressing: [x] Met: [] Not Met: [] Adjusted    Long Term Goals: To be achieved in: 6 weeks  1. Disability index score of 10% or less for the NDI to assist with reaching prior level of function. [] Progressing: [] Met: [] Not Met: [] Adjusted  2.  Patient will perform pain-free & maintain AROM of cervical/thoracic spine  to allow for proper joint functioning as indicated by patients Functional Deficits. [] Progressing: [x] Met: [] Not Met: [] Adjusted  3. Patient will demonstrate an increase in postural awareness and control and activation of deep cervical stabilizers to allow for proper functional mobility as indicated by patients Functional Deficits. [x] Progressing: [x] Met: [] Not Met: [] Adjusted  4. Patient will return to functional activities including sleeping without increased symptoms or restriction. [x] Progressing: [] Met: [] Not Met: [] Adjusted  5. Patient to demonstrate L shoulder complex strength of 4+/5 throughout in order to reach [facility] & lift objects pain-free. [x] Progressing: [] Met: [] Not Met: [] Adjusted       Overall Progression Towards Functional goals/ Treatment Progress Update:  [] Patient is progressing as expected towards functional goals listed. [] Progression is slowed due to complexities/Impairments listed. [] Progression has been slowed due to co-morbidities. [x] Plan just implemented, too soon to assess goals progression <30days   [] Goals require adjustment due to lack of progress  [] Patient is not progressing as expected and requires additional follow up with physician  [] Other    Persisting Functional Limitations/Impairments:  []Sitting []Standing   []Walking []Squatting/bending    []Stairs []ADL's    []Transfers [x]Reaching  []Housework []Lifting  []Driving [x]Job related tasks  []Sports/Recreation  []Sleeping  []Other:    ASSESSMENT:      Treatment focused on B shoulder pain vs cervical pain as per patient complaint. Patient tolerated wand ex's well and plans to perform at home to manage improved scapular mechanics during RUE activities. Pt frequently compensates R shoulder movements w/upper trap compensations. Continue to facilitate correct scapular mechanics to reduce shoulder pain.       Treatment/Activity Tolerance:  [x] Patient able to complete tx   [] Patient limited by fatique  [] Patient limited by pain   [] Patient limited by other medical complications  [] Other:     Prognosis: [x] Good [] Fair  [] Poor    Patient Requires Follow-up: [x] Yes  [] No    PLAN: See eval. PT 2x / week for 6 weeks. [x] Continue per plan of care [] Alter current plan (see comments)  [] Plan of care initiated [] Hold pending MD visit [] Discharge    Electronically signed by: Marcelina Taylor PT      Note: If patient does not return for scheduled/ recommended follow up visits, this note will serve as a discharge from care along with most recent update on progress.

## 2021-05-21 ENCOUNTER — HOSPITAL ENCOUNTER (OUTPATIENT)
Dept: PHYSICAL THERAPY | Age: 51
Setting detail: THERAPIES SERIES
Discharge: HOME | End: 2021-05-21
Payer: COMMERCIAL

## 2021-05-21 PROCEDURE — 97140 MANUAL THERAPY 1/> REGIONS: CPT

## 2021-05-21 PROCEDURE — 97110 THERAPEUTIC EXERCISES: CPT

## 2021-05-21 PROCEDURE — 97112 NEUROMUSCULAR REEDUCATION: CPT

## 2021-05-21 NOTE — FLOWSHEET NOTE
Cervical Flexion WNL     Cervical Extension WFL     Cervical SB WFL  -L UT stretch w/ R SB   Cervical rotation WFL      Strength / Myotomes Left   Shoulder Flex 4-   Shoulder Scap 4-    Shoulder ER 3+   Shoulder IR  4-         RESTRICTIONS/PRECAUTIONS:  HTN, pseudotumor cerebri    Exercises/Interventions:   Therapeutic Exercise (55637) Resistance / level Sets/sec Reps Notes   UBE  -fwd / bwd  2'/2'     Pulley's  -flex & abd  Unilateral pec stretch at wall     x20 ea    Cerv & scap stabs at wall  Chin tucks w/ball  Cervical SB  Cervical rotation w/ball  B scap retract  W's -back against wall  W to Y's  B shoulder flex/scap w/chin tuck  B shoulder abd w/chin tuck    Wall slides w/lift off  Wall slides  2-3\" hold15  1510    15 B            -pain in R shoulder but able to complete reps   Resistive bands  Seated B GH ER w/scap retract/ No money  Mid row  GH ext  [facility] ER  Robber pose  High Row     Lime  Lime  Lime  lime  lime     Lime                 Added 5/7   Seated stretches       Prone series  NPV ?       Mat table  supine  Shoulder flex  Serratus punch    S/L shld abd   SL punch   S/L  GH ER  Sleeper stretch     Wand:  Flex  Chest press  [facility] ER/IR   1#1#1#                  20 B    20 B    20 B                                        Therapeutic Activities (67889)             5/7:  Discussed improved posture while driving and reducing dependency on LUE as she places hand on top of wheel, work station set up                                    [employer] re-education (43053)       Mat table  Supine  Chin tucks  HEP  scap retract  HEP  Shoulder flexion    Seated median nerve glides, wrist flex & ext    PEACE scap retract & protract                                  -unable to maintain good shld abd & UT compensations developed   Prone Hughston's   -T's  -Y's                              Manual Intervention (20802)      5/5:  **blue bolster placed perpendicular to spine at T1/C7 level throughout manual   Cerv mobs R rot mobs to C5  P/A mobs grade II-III C4-C7       ULNT:  Median glides  -elbow flex & ext  -wrist flex & ext  Radial glides  Ulnar glides           Supine stretches:  Upper trap  SCM  B pec minor stretch  Anterior scalene stretch          2x30\" B     SOR  2 mins    STM B cervical paraspinals, B upper trap, B suboccipitals  5 mins    Cervical distraction      R shoulder PROM into each direction, w/2\" hold at end range  4 mins                    Modalities:   4/12:  Supine CP to posterior cervical spine and L shoulder. 4/9: deferred traction, pt plans to assess symptoms throughout the day and determine if traction helped or if massage from family helped her symptoms. Will review and determine appropriateness next session. 4/6:  Mechanical cervical traction 12#/8#, 30\" on / 10\" off, x10 mins w/bolsters. Patient was given panic button as well as instructed how to use it if experiencing pain. Patient was also given bell to call if anything is needed. Patient education:  3/29:  -pt educated on diagnosis, prognosis and expectations for rehab  -all pt questions were answered    Home Exercise Program:     Access Code: [email or web address]  URL: [email or web address]  Date: 04/27/2021  Prepared by: Moses Risk    Exercises  Pec Minor Stretch - 3 x daily - 7 x weekly - 3 sets - 30 hold  Sidelying Shoulder ER with Towel and Dumbbell - 2 x daily - 7 x weekly - 15 reps  Sidelying Shoulder Abduction Palm Forward - 2 x daily - 7 x weekly - 15 reps  Supine Scapular Protraction in Flexion with Dumbbells - 2 x daily - 7 x weekly - 15 reps    Access Code: [email or web address]  URL: [email or web address]  Date: 03/29/2021  Prepared by: Moses Risk     Exercises  Supine Chin Tuck - 2 x daily - 7 x weekly - 10 reps  Seated Scapular Retraction - 2 x daily - 7 x weekly - 10 reps  Supine Shoulder Flexion Extension Full Range AROM - 2 x daily - 7 x weekly - 10 reps  Shoulder External Rotation and Scapular Retraction with Resistance - 2 x daily - 7 x weekly - 10 reps    Therapeutic Exercise and NMR EXR  [x] (23511) Provided verbal/tactile cueing for activities related to strengthening, flexibility, endurance, ROM  for improvements in cervical, postural, scapular, scapulothoracic and UE control with self care, reaching, carrying, lifting, house/yardwork, driving/computer work.    [] (60944) Provided verbal/tactile cueing for activities related to improving balance, coordination, kinesthetic sense, posture, motor skill, proprioception  to assist with cervical, scapular, scapulothoracic and UE control with self care, reaching, carrying, lifting, house/yardwork, driving/computer work.  [] (58376) Therapist is in constant attendance of 2 or more patients providing skilled therapy interventions, but not providing any significant amount of measurable one-on-one time to either patient, for improvements in cervical, scapular, scapulothoracic and UE control with self care, reaching, carrying, lifting, house/yardwork, driving, computer work. Therapeutic Activities:    [x] (55237 or 62108) Provided verbal/tactile cueing for activities related to improving balance, coordination, kinesthetic sense, posture, motor skill, proprioception and motor activation to allow for proper function of cervical, scapular, scapulothoracic and UE control with self care, carrying, lifting, driving/computer work.      Home Exercise Program:    [x] (87052) Reviewed/Progressed HEP activities related to strengthening, flexibility, endurance, ROM of cervical, scapular, scapulothoracic and UE control with self care, reaching, carrying, lifting, house/yardwork, driving/computer work  [] (56292) Reviewed/Progressed HEP activities related to improving balance, coordination, kinesthetic sense, posture, motor skill, proprioception of cervical, scapular, scapulothoracic and UE control with self care, reaching, carrying, lifting, house/yardwork, driving/computer work      Manual Treatments: PROM / STM / Oscillations-Mobs:  G-I, II, III, IV (PA's, Inf., Post.)  [x] (89812) Provided manual therapy to mobilize soft tissue/joints of cervical/CT, scapular GHJ and UE for the purpose of decreasing headache, modulating pain, promoting relaxation,  increasing ROM, reducing/eliminating soft tissue swelling/inflammation/restriction, improving soft tissue extensibility and allowing for proper ROM for normal function with self care, reaching, carrying, lifting, house/yardwork, driving/computer work    Charges:  Timed Code Treatment Minutes: 45   Total Treatment Minutes: 45       [] EVAL - LOW (77803)   [] EVAL - MOD (84293)  [] EVAL - HIGH (43831)  [] RE-EVAL (15739)  [x] MD(75737) x  1    [] Ionto  [x] NMR (22441) x  1     [] Vaso  [x] Manual (77904) x  1     [] Ultrasound  [] TA x        [] Mech Traction (25801)  [] Aquatic Therapy x      [] ES (un) (51874):   [] Home Management Training x  [] ES(attended) (36217)   [] Dry Needling 1-2 muscles (73249):  [] Dry Needling 3+ muscles (778460  [] Group:      [] Other:     GOALS:  Patient stated goal: alleviate pain  [] Progressing: [] Met: [] Not Met: [] Adjusted    Therapist goals for Patient:   Short Term Goals: To be achieved in: 2 weeks  1. Independent in HEP and progression per patient tolerance, in order to prevent re-injury. [] Progressing: [x] Met: [] Not Met: [] Adjusted  2. Patient will have a decrease in pain to facilitate improvement in movement, function, and ADLs as indicated by Functional Deficits. [] Progressing: [x] Met: [] Not Met: [] Adjusted    Long Term Goals: To be achieved in: 6 weeks  1. Disability index score of 10% or less for the NDI to assist with reaching prior level of function. [] Progressing: [] Met: [] Not Met: [] Adjusted  2. Patient will perform pain-free & maintain AROM of cervical/thoracic spine  to allow for proper joint functioning as indicated by patients Functional Deficits.    [] Progressing: [x] Met: [] Not Met: [] Adjusted  3. Patient will demonstrate an increase in postural awareness and control and activation of deep cervical stabilizers to allow for proper functional mobility as indicated by patients Functional Deficits. [x] Progressing: [x] Met: [] Not Met: [] Adjusted  4. Patient will return to functional activities including sleeping without increased symptoms or restriction. [x] Progressing: [] Met: [] Not Met: [] Adjusted  5. Patient to demonstrate L shoulder complex strength of 4+/5 throughout in order to reach [place] & lift objects pain-free. [x] Progressing: [] Met: [] Not Met: [] Adjusted       Overall Progression Towards Functional goals/ Treatment Progress Update:  [] Patient is progressing as expected towards functional goals listed. [] Progression is slowed due to complexities/Impairments listed. [] Progression has been slowed due to co-morbidities. [x] Plan just implemented, too soon to assess goals progression <30days   [] Goals require adjustment due to lack of progress  [] Patient is not progressing as expected and requires additional follow up with physician  [] Other    Persisting Functional Limitations/Impairments:  []Sitting []Standing   []Walking []Squatting/bending    []Stairs []ADL's    []Transfers [x]Reaching  []Housework []Lifting  []Driving [x]Job related tasks  []Sports/Recreation  []Sleeping  []Other:    ASSESSMENT:      Patient's R shoulder PROM is good w/joint pain at end range of each direction, when pt attempted to activate shoulder joint muscles near end range, pain developed. Strengthen R shoulder at pain-free, end range and scapular upward rotators.   Continue cervical stabs and manual.    Treatment/Activity Tolerance:  [x] Patient able to complete tx   [] Patient limited by fatique  [] Patient limited by pain   [] Patient limited by other medical complications  [] Other:     Prognosis: [x] Good [] Fair  [] Poor    Patient Requires Follow-up: [x] Yes  [] No    PLAN: See pb. PT 2x

## 2021-05-24 DIAGNOSIS — G93.2 PSEUDOTUMOR CEREBRI: Chronic | ICD-10-CM

## 2021-05-24 RX ORDER — ACETAZOLAMIDE 250 MG/1
TABLET ORAL
Qty: 180 TABLET | Refills: 0 | Status: SHIPPED | OUTPATIENT
Start: 2021-05-24 | End: 2021-08-04 | Stop reason: SDUPTHER

## 2021-05-25 ENCOUNTER — HOSPITAL ENCOUNTER (OUTPATIENT)
Dept: PHYSICAL THERAPY | Age: 51
Setting detail: THERAPIES SERIES
Discharge: HOME | End: 2021-05-25
Payer: COMMERCIAL

## 2021-05-25 PROCEDURE — 97530 THERAPEUTIC ACTIVITIES: CPT

## 2021-05-25 PROCEDURE — 97110 THERAPEUTIC EXERCISES: CPT

## 2021-05-25 NOTE — FLOWSHEET NOTE
Karan Lutz  Phone: [phone]   Fax: [phone]      Physical Therapy Treatment Note/ Progress Report:     Date:  2021    Patient Name:  Luz Maria Rao    :  1970  MRN: [id number]  Restrictions/Precautions:    Medical/Treatment Diagnosis Information:  · Diagnosis: M54.12 (ICD-10-CM) - Cervical radicular pain  · Treatment Diagnosis: Misalinged cervicothoracic spine with decreased stability, Pain w/cervical extension, Decreased scapular stability  Insurance/Certification information:  PT Insurance Information: A[employer]na. 120 combined visits  Physician Information:  Referring Practitioner: Onesimo Grissom MD  Plan of care signed (Y/N): [x]  Yes []  No     Date of Patient follow up with Physician:         Progress Report: [x]  Yes  []  No     Date Range for reporting period:  Beginnin21  Endin21    Progress report due (10 Rx/or 30 days whichever is less): visit #10 or  (date)     Recertification due (POC duration/ or 90 days whichever is less): visit # or 21(date)     Visit # Insurance Allowable Auth required? Date Range    + 3/8 120 combined visits []  Yes  [x]  No pcy     Latex Allergy:  [x]NO      []YES  Preferred Language for Healthcare:   [x]English       []other:    Functional Scale:       Date assessed:  NDI: raw score = 17; dysfunction = 34%   3/29  NDI: raw score = 22; dysfunction = 44%     NDI: raw score = 12; dysfunction = 24%         Pain level:  10 -L lateral shoulder and elbow, cervicothoracic jxn    SUBJECTIVE:     R lateral shoulder pain is bothering her more now but still has light pain in LUE, feels it most at the base of her neck. Is sleeping better still but not yet back to her normal sleep, still wakes due to aggravation or pain. Able to reach [place] pretty well, no trouble w/LUE, RUE is harder due to new onset of pain. Has more trouble lifting heavier items. OBJECTIVE:    Observation:  4/27:  L upper trap compensations w/seated LUE movements, is not present during supine LUE movements   Test measurements:    5/25:  Strength  (AROM) Left Right   Shoulder Flex 4- (WFL) 4* (145 deg)   Shoulder Scap 4- (WFL) 4+ (126 deg)   Shoulder ER 4- (T3) 4+ (T3)   Shoulder IR  4 (T10) 4+ (T6)   *increased pain     4/13:  BP R arm 148/105 , L arm 154/96 HR 99   -wrote down for pt to take with her for PCP  4/20:  Pt 11 mins late    4/27:  CERV ROM       Cervical Flexion WNL     Cervical Extension WFL     Cervical SB WFL  -L UT stretch w/ R SB   Cervical rotation WFL      Strength / Myotomes Left   Shoulder Flex 4-   Shoulder Scap 4-    Shoulder ER 3+   Shoulder IR  4-         RESTRICTIONS/PRECAUTIONS:  HTN, pseudotumor cerebri    Exercises/Interventions:   Therapeutic Exercise (41597) Resistance / level Sets/sec Reps Notes   UBE  -fwd / bwd  2'/2'     Pulley's  -flex & abd  Unilateral pec stretch at wall     x20 ea    Cerv & scap stabs at wall  Chin tucks w/ball  Cervical SB  Cervical rotation w/ball  B scap retract  W's -back against wall  W to Y's  B shoulder flex/scap w/chin tuck  B shoulder abd w/chin tuck    Wall slides w/lift off  Wall slides  15  1510                -pain in R shoulder but able to complete reps   Resistive bands  Seated B GH ER w/scap retract/ No money  Mid row  GH ext  [facility] ER  Robber pose  High Row     Lime  Lime  Lime  lime  lime     Lime                 Added 5/7   Seated stretches       Prone series  NPV ?       Mat table  supine  Shoulder flex  Serratus punch    S/L shld abd   SL punch   S/L  GH ER  Sleeper stretch     Wand:  Flex  Chest press  [facility] ER/IR   1#1#1#                                                   [facility]  Navigated 2 flights of stairs reciprocally w/HR    Cybex  Mid row  -horiz   -vert   Lat delt raise    FM  Mid row    Bar bell lat shld raise to chin             10#  10#  20#      10#    10#   x1 10  15  15      15    10                           -difficulty relaxing R upper trap   Therapeutic Activities (15506)             5/7:  Discussed improved posture while driving and reducing dependency on LUE as she places hand on top of wheel, work station set up                                    [employer] re-education (12371)       Mat table  Supine  Chin tucks  HEP  scap retract  HEP  Shoulder flexion    Seated median nerve glides, wrist flex & ext    PEACE scap retract & protract                                  -unable to maintain good shld abd & UT compensations developed   Prone Hughston's   -T's  -Y's                              Manual Intervention (71195)      5/5:  **blue bolster placed perpendicular to spine at T1/C7 level throughout manual   Cerv mobs R rot mobs to C5  P/A mobs grade II-III C4-C7       ULNT:  Median glides  -elbow flex & ext  -wrist flex & ext  Radial glides  Ulnar glides           Supine stretches:  Upper trap  SCM  B pec minor stretch  Anterior scalene stretch      SOR      STM B cervical paraspinals, B upper trap, B suboccipitals      Cervical distraction      R shoulder PROM into each direction, w/2\" hold at end range                      Modalities:   4/12:  Supine CP to posterior cervical spine and L shoulder. 4/9: deferred traction, pt plans to assess symptoms throughout the day and determine if traction helped or if massage from family helped her symptoms. Will review and determine appropriateness next session. 4/6:  Mechanical cervical traction 12#/8#, 30\" on / 10\" off, x10 mins w/bolsters. Patient was given panic button as well as instructed how to use it if experiencing pain. Patient was also given bell to call if anything is needed. Patient education:  3/29:  -pt educated on diagnosis, prognosis and expectations for rehab  -all pt questions were answered    Home Exercise Program:     Access Code: [email or web address]  URL: [email or web address]  Date: 04/27/2021  Prepared by: Gareth Su    Exercises  Pec Minor Stretch - 3 x daily - 7 x weekly - 3 sets - 30 hold  Sidelying Shoulder ER with Towel and Dumbbell - 2 x daily - 7 x weekly - 15 reps  Sidelying Shoulder Abduction Palm Forward - 2 x daily - 7 x weekly - 15 reps  Supine Scapular Protraction in Flexion with Dumbbells - 2 x daily - 7 x weekly - 15 reps    Access Code: [email or web address]  URL: [email or web address]  Date: 03/29/2021  Prepared by: Gareth Buredel     Exercises  Supine Chin Tuck - 2 x daily - 7 x weekly - 10 reps  Seated Scapular Retraction - 2 x daily - 7 x weekly - 10 reps  Supine Shoulder Flexion Extension Full Range AROM - 2 x daily - 7 x weekly - 10 reps  Shoulder External Rotation and Scapular Retraction with Resistance - 2 x daily - 7 x weekly - 10 reps    Therapeutic Exercise and NMR EXR  [x] (36230) Provided verbal/tactile cueing for activities related to strengthening, flexibility, endurance, ROM  for improvements in cervical, postural, scapular, scapulothoracic and UE control with self care, reaching, carrying, lifting, house/yardwork, driving/computer work.    [] (57402) Provided verbal/tactile cueing for activities related to improving balance, coordination, kinesthetic sense, posture, motor skill, proprioception  to assist with cervical, scapular, scapulothoracic and UE control with self care, reaching, carrying, lifting, house/yardwork, driving/computer work.  [] (62409) Therapist is in constant attendance of 2 or more patients providing skilled therapy interventions, but not providing any significant amount of measurable one-on-one time to either patient, for improvements in cervical, scapular, scapulothoracic and UE control with self care, reaching, carrying, lifting, house/yardwork, driving, computer work.      Therapeutic Activities:    [x] (31209 or 34117) Provided verbal/tactile cueing for activities related to improving balance, coordination, kinesthetic sense, posture, motor skill, proprioception and motor activation to allow for proper function of cervical, scapular, scapulothoracic and UE control with self care, carrying, lifting, driving/computer work.      Home Exercise Program:    [x] (18940) Reviewed/Progressed HEP activities related to strengthening, flexibility, endurance, ROM of cervical, scapular, scapulothoracic and UE control with self care, reaching, carrying, lifting, house/yardwork, driving/computer work  [] (07031) Reviewed/Progressed HEP activities related to improving balance, coordination, kinesthetic sense, posture, motor skill, proprioception of cervical, scapular, scapulothoracic and UE control with self care, reaching, carrying, lifting, house/yardwork, driving/computer work      Manual Treatments:  PROM / STM / Oscillations-Mobs:  G-I, II, III, IV (PA's, Inf., Post.)  [x] (50120) Provided manual therapy to mobilize soft tissue/joints of cervical/CT, scapular GHJ and UE for the purpose of decreasing headache, modulating pain, promoting relaxation,  increasing ROM, reducing/eliminating soft tissue swelling/inflammation/restriction, improving soft tissue extensibility and allowing for proper ROM for normal function with self care, reaching, carrying, lifting, house/yardwork, driving/computer work    Charges:  Timed Code Treatment Minutes: 42   Total Treatment Minutes: 42       [] EVAL - LOW (61375)   [] EVAL - MOD (21118)  [] EVAL - HIGH (82678)  [] RE-EVAL (85707)  [x] BS(15343) x  1    [] Ionto  [x] NMR (53023) x  1     [] Vaso  [x] Manual (37240) x  1     [] Ultrasound  [] TA x        [] Mech Traction (73022)  [] Aquatic Therapy x      [] ES (un) (62072):   [] Home Management Training x  [] ES(attended) (78220)   [] Dry Needling 1-2 muscles (08232):  [] Dry Needling 3+ muscles (913568  [] Group:      [] Other:     GOALS:  Patient stated goal: alleviate pain  [] Progressing: [] Met: [] Not Met: [] Adjusted    Therapist goals for Patient:   Short Term Goals: To be achieved in: 2 weeks  1. Independent in HEP and progression per patient tolerance, in order to prevent re-injury. [] Progressing: [x] Met: [] Not Met: [] Adjusted  2. Patient will have a decrease in pain to facilitate improvement in movement, function, and ADLs as indicated by Functional Deficits. [] Progressing: [x] Met: [] Not Met: [] Adjusted    Long Term Goals: To be achieved in: 6 weeks  1. Disability index score of 10% or less for the NDI to assist with reaching prior level of function. [x] Progressing: [] Met: [] Not Met: [] Adjusted  2. Patient will perform pain-free & maintain AROM of cervical/thoracic spine  to allow for proper joint functioning as indicated by patients Functional Deficits. [] Progressing: [x] Met: [] Not Met: [] Adjusted  3. Patient will demonstrate an increase in postural awareness and control and activation of deep cervical stabilizers to allow for proper functional mobility as indicated by patients Functional Deficits. [x] Progressing: [x] Met: [] Not Met: [] Adjusted  4. Patient will return to functional activities including sleeping without increased symptoms or restriction. [x] Progressing: [] Met: [] Not Met: [] Adjusted  5. Patient to demonstrate L shoulder complex strength of 4+/5 throughout in order to reach [place] & lift objects pain-free. [x] Progressing: [] Met: [] Not Met: [] Adjusted       Overall Progression Towards Functional goals/ Treatment Progress Update:  [] Patient is progressing as expected towards functional goals listed. [] Progression is slowed due to complexities/Impairments listed. [] Progression has been slowed due to co-morbidities.   [x] Plan just implemented, too soon to assess goals progression <30days   [] Goals require adjustment due to lack of progress  [] Patient is not progressing as expected and requires additional follow up with physician  [] Other    Persisting Functional Limitations/Impairments:  []Sitting []Standing   []Walking []Squatting/bending    []Stairs []ADL's    []Transfers [x]Reaching  []Housework []Lifting  []Driving [x]Job related tasks  []Sports/Recreation  []Sleeping  []Other:    ASSESSMENT:      Pt able to demo good scapular posture with each UE machine, had trouble reducing R upper trap compensation while performing bar bell raises. Patient remains weak in B shoulders, AROM of L shoulder has progressed well, R shoulder is slightly diminished. Pt can continue to benefit from PT to improve shoulder strength and posture with all dynamic activities. Treatment/Activity Tolerance:  [x] Patient able to complete tx   [] Patient limited by fatique  [] Patient limited by pain   [] Patient limited by other medical complications  [] Other:     Prognosis: [x] Good [] Fair  [] Poor    Patient Requires Follow-up: [x] Yes  [] No    PLAN: See eval. PT 2x / week for 6 weeks. [x] Continue per plan of care [] Alter current plan (see comments)  [] Plan of care initiated [] Hold pending MD visit [] Discharge    Electronically signed by: Royanne Soulier, PT      Note: If patient does not return for scheduled/ recommended follow up visits, this note will serve as a discharge from care along with most recent update on progress.

## 2021-05-28 ENCOUNTER — HOSPITAL ENCOUNTER (OUTPATIENT)
Dept: PHYSICAL THERAPY | Age: 51
Setting detail: THERAPIES SERIES
Discharge: HOME | End: 2021-05-28
Payer: COMMERCIAL

## 2021-05-28 PROCEDURE — 97110 THERAPEUTIC EXERCISES: CPT

## 2021-05-28 PROCEDURE — 97140 MANUAL THERAPY 1/> REGIONS: CPT

## 2021-05-28 NOTE — FLOWSHEET NOTE
NeldaJesus Manuels  Phone: [phone]   Fax: [phone]      Physical Therapy Treatment Note/ Progress Report:     Date:  2021    Patient Name:  Yovani Alva    :  1970  MRN: [id number]  Restrictions/Precautions:    Medical/Treatment Diagnosis Information:  · Diagnosis: M54.12 (ICD-10-CM) - Cervical radicular pain  · Treatment Diagnosis: Misalinged cervicothoracic spine with decreased stability, Pain w/cervical extension, Decreased scapular stability  Insurance/Certification information:  PT Insurance Information: Aetna. 120 combined visits  Physician Information:  Referring Practitioner: Filemon Milton MD  Plan of care signed (Y/N): [x]  Yes []  No     Date of Patient follow up with Physician:         Progress Report: [x]  Yes  []  No     Date Range for reporting period:  Beginnin21  Endin21    Progress report due (10 Rx/or 30 days whichever is less): visit #10 or 3/34/77 (date)     Recertification due (POC duration/ or 90 days whichever is less): visit # or 21(date)     Visit # Insurance Allowable Auth required? Date Range    +  120 combined visits []  Yes  [x]  No pcy     Latex Allergy:  [x]NO      []YES  Preferred Language for Healthcare:   [x]English       []other:    Functional Scale:       Date assessed:  NDI: raw score = 17; dysfunction = 34%   3/29  NDI: raw score = 22; dysfunction = 44%     NDI: raw score = 12; dysfunction = 24%         Pain level:  10 -L lateral shoulder and elbow, cervicothoracic jxn    SUBJECTIVE:     Doing good today, still having pain in B shoulders and base of neck but is light & only increases when reaching the wrong way. Wasn't sore after last session and would like to do ex's in [place] again.         OBJECTIVE:    Observation:  :  L upper trap compensations w/seated LUE movements, is not present during supine LUE movements   Test measurements: 5/25:  Strength  (AROM) Left Right   Shoulder Flex 4- (WFL) 4* (145 deg)   Shoulder Scap 4- (WFL) 4+ (126 deg)   Shoulder ER 4- (T3) 4+ (T3)   Shoulder IR  4 (T10) 4+ (T6)   *increased pain     4/13:  BP R arm 148/105 , L arm 154/96 HR 99   -wrote down for pt to take with her for PCP  4/20:  Pt 11 mins late    4/27:  CERV ROM       Cervical Flexion WNL     Cervical Extension WFL     Cervical SB WFL  -L UT stretch w/ R SB   Cervical rotation WFL      Strength / Myotomes Left   Shoulder Flex 4-   Shoulder Scap 4-    Shoulder ER 3+   Shoulder IR  4-         RESTRICTIONS/PRECAUTIONS:  HTN, pseudotumor cerebri    Exercises/Interventions:   Therapeutic Exercise (88654) Resistance / level Sets/sec Reps Notes   UBE  -fwd / bwd  2'/2'     Pulley's  -flex & abd  Unilateral pec stretch at wall     x20 ea    Cerv & scap stabs at wall  Chin tucks w/ball  Cervical SB  Cervical rotation w/ball  B scap retract  W's -back against wall  W to Y's  B shoulder flex/scap w/chin tuck  B shoulder abd w/chin tuck    Wall slides w/lift off  Wall slides  15  1510                -pain in R shoulder but able to complete reps   Resistive bands  Seated B GH ER w/scap retract/ No money  Mid row  GH ext  [facility] ER  Robber pose  High Row     Lime  Lime  Lime  lime  lime     Lime                 Added 5/7   Seated stretches       Prone series  NPV ?       Mat table  supine  Shoulder flex  Serratus punch    S/L shld abd   SL punch   S/L  GH ER  Sleeper stretch     Wand:  Flex  Chest press  [facility] ER/IR   1#1#1#                                                   [facility]  Navigated 2 flights of stairs reciprocally w/HR    Cybex  Mid row  -horiz   -vert   Lat delt raise    FM  Mid row  Lat pull down  -row   -lat     Bar bell lat shld raise to chin             15#  10#  30#      20#    30#  30#    10#   x1             20    10      20    15  10                 -difficulty performing w/correct form                 Therapeutic Stretch - 3 x daily - 7 x weekly - 3 sets - 30 hold  Sidelying Shoulder ER with Towel and Dumbbell - 2 x daily - 7 x weekly - 15 reps  Sidelying Shoulder Abduction Palm Forward - 2 x daily - 7 x weekly - 15 reps  Supine Scapular Protraction in Flexion with Dumbbells - 2 x daily - 7 x weekly - 15 reps    Access Code: [email or web address]  URL: [email or web address]  Date: 03/29/2021  Prepared by: Toñito Grade     Exercises  Supine Chin Tuck - 2 x daily - 7 x weekly - 10 reps  Seated Scapular Retraction - 2 x daily - 7 x weekly - 10 reps  Supine Shoulder Flexion Extension Full Range AROM - 2 x daily - 7 x weekly - 10 reps  Shoulder External Rotation and Scapular Retraction with Resistance - 2 x daily - 7 x weekly - 10 reps    Therapeutic Exercise and NMR EXR  [x] (16664) Provided verbal/tactile cueing for activities related to strengthening, flexibility, endurance, ROM  for improvements in cervical, postural, scapular, scapulothoracic and UE control with self care, reaching, carrying, lifting, house/yardwork, driving/computer work.    [] (24657) Provided verbal/tactile cueing for activities related to improving balance, coordination, kinesthetic sense, posture, motor skill, proprioception  to assist with cervical, scapular, scapulothoracic and UE control with self care, reaching, carrying, lifting, house/yardwork, driving/computer work.  [] (07778) Therapist is in constant attendance of 2 or more patients providing skilled therapy interventions, but not providing any significant amount of measurable one-on-one time to either patient, for improvements in cervical, scapular, scapulothoracic and UE control with self care, reaching, carrying, lifting, house/yardwork, driving, computer work.      Therapeutic Activities:    [x] (90324 or 63183) Provided verbal/tactile cueing for activities related to improving balance, coordination, kinesthetic sense, posture, motor skill, proprioception and motor activation to allow for proper function of cervical, scapular, scapulothoracic and UE control with self care, carrying, lifting, driving/computer work. Home Exercise Program:    [x] (66615) Reviewed/Progressed HEP activities related to strengthening, flexibility, endurance, ROM of cervical, scapular, scapulothoracic and UE control with self care, reaching, carrying, lifting, house/yardwork, driving/computer work  [] (20224) Reviewed/Progressed HEP activities related to improving balance, coordination, kinesthetic sense, posture, motor skill, proprioception of cervical, scapular, scapulothoracic and UE control with self care, reaching, carrying, lifting, house/yardwork, driving/computer work      Manual Treatments:  PROM / STM / Oscillations-Mobs:  G-I, II, III, IV (PA's, Inf., Post.)  [x] (28065) Provided manual therapy to mobilize soft tissue/joints of cervical/CT, scapular GHJ and UE for the purpose of decreasing headache, modulating pain, promoting relaxation,  increasing ROM, reducing/eliminating soft tissue swelling/inflammation/restriction, improving soft tissue extensibility and allowing for proper ROM for normal function with self care, reaching, carrying, lifting, house/yardwork, driving/computer work    Charges:  Timed Code Treatment Minutes: 39   Total Treatment Minutes: 39       [] EVAL - LOW (77921)   [] EVAL - MOD (32354)  [] EVAL - HIGH (47917)  [] RE-EVAL (27856)  [x] ZJ(75571) x  2    [] Ionto  [] NMR (50734) x       [] Vaso  [x] Manual (28032) x  1     [] Ultrasound  [] TA x        [] Mech Traction (96188)  [] Aquatic Therapy x      [] ES (un) (59994):   [] Home Management Training x  [] ES(attended) (13113)   [] Dry Needling 1-2 muscles (91759):  [] Dry Needling 3+ muscles (096680  [] Group:      [] Other:     GOALS:  Patient stated goal: alleviate pain  [] Progressing: [] Met: [] Not Met: [] Adjusted    Therapist goals for Patient:   Short Term Goals: To be achieved in: 2 weeks  1.  Independent in HEP and progression per patient tolerance, in order to prevent re-injury. [] Progressing: [x] Met: [] Not Met: [] Adjusted  2. Patient will have a decrease in pain to facilitate improvement in movement, function, and ADLs as indicated by Functional Deficits. [] Progressing: [x] Met: [] Not Met: [] Adjusted    Long Term Goals: To be achieved in: 6 weeks  1. Disability index score of 10% or less for the NDI to assist with reaching prior level of function. [x] Progressing: [] Met: [] Not Met: [] Adjusted  2. Patient will perform pain-free & maintain AROM of cervical/thoracic spine  to allow for proper joint functioning as indicated by patients Functional Deficits. [] Progressing: [x] Met: [] Not Met: [] Adjusted  3. Patient will demonstrate an increase in postural awareness and control and activation of deep cervical stabilizers to allow for proper functional mobility as indicated by patients Functional Deficits. [x] Progressing: [x] Met: [] Not Met: [] Adjusted  4. Patient will return to functional activities including sleeping without increased symptoms or restriction. [x] Progressing: [] Met: [] Not Met: [] Adjusted  5. Patient to demonstrate L shoulder complex strength of 4+/5 throughout in order to reach [place] & lift objects pain-free. [x] Progressing: [] Met: [] Not Met: [] Adjusted       Overall Progression Towards Functional goals/ Treatment Progress Update:  [] Patient is progressing as expected towards functional goals listed. [] Progression is slowed due to complexities/Impairments listed. [] Progression has been slowed due to co-morbidities.   [x] Plan just implemented, too soon to assess goals progression <30days   [] Goals require adjustment due to lack of progress  [] Patient is not progressing as expected and requires additional follow up with physician  [] Other    Persisting Functional Limitations/Impairments:  []Sitting []Standing   []Walking []Squatting/bending    []Stairs []ADL's

## 2021-06-01 ENCOUNTER — HOSPITAL ENCOUNTER (OUTPATIENT)
Dept: PHYSICAL THERAPY | Age: 51
Setting detail: THERAPIES SERIES
Discharge: HOME | End: 2021-06-01
Payer: COMMERCIAL

## 2021-06-01 NOTE — FLOWSHEET NOTE
[address] [facility]    Physical Therapy  Cancellation/No-show Note  Patient Name:  Cleveland Levy  :  1970   Date:  2021    Cancelled visits to date: 1  No-shows to date: 1    For today's appointment patient:  []  Cancelled   []  Rescheduled appointment  [x]  No-show      Reason given by patient:  []  Patient ill  []  Conflicting appointment  []  No transportation    []  Conflict with work  []  No reason given  []  Other:     Comments:      Phone call information:   []  Phone call made today to patient at _ time at number provided:      []  Patient answered, conversation as follows:    []  Patient did not answer, message left as follows:  []  Phone call not made today  [x]  Phone call not needed - pt contacted us to cancel and provided reason for cancellation.      Electronically signed by:  Nafisa Uribe, PT, PT

## 2021-06-04 ENCOUNTER — HOSPITAL ENCOUNTER (OUTPATIENT)
Dept: PHYSICAL THERAPY | Age: 51
Setting detail: THERAPIES SERIES
Discharge: HOME | End: 2021-06-04
Payer: COMMERCIAL

## 2021-06-04 PROCEDURE — 97110 THERAPEUTIC EXERCISES: CPT

## 2021-06-04 PROCEDURE — 97530 THERAPEUTIC ACTIVITIES: CPT

## 2021-06-04 NOTE — FLOWSHEET NOTE
Karan Lutz  Phone: [phone]   Fax: [phone]      Physical Therapy Treatment Note/ Progress Report:     Date:  2021    Patient Name:  Claudean Patient    :  1970  MRN: [id number]  Restrictions/Precautions:    Medical/Treatment Diagnosis Information:  · Diagnosis: M54.12 (ICD-10-CM) - Cervical radicular pain  · Treatment Diagnosis: Misalinged cervicothoracic spine with decreased stability, Pain w/cervical extension, Decreased scapular stability  Insurance/Certification information:  PT Insurance Information: Aetna. 120 combined visits  Physician Information:  Referring Practitioner: Karan Zayas MD  Plan of care signed (Y/N): [x]  Yes []  No     Date of Patient follow up with Physician:         Progress Report: [x]  Yes  []  No     Date Range for reporting period:  Beginnin21  Endin21    Progress report due (10 Rx/or 30 days whichever is less): visit #10 or 4/28/15 (date)     Recertification due (POC duration/ or 90 days whichever is less): visit # or 21(date)     Visit # Insurance Allowable Auth required? Date Range    +  120 combined visits []  Yes  [x]  No pcy     Latex Allergy:  [x]NO      []YES  Preferred Language for Healthcare:   [x]English       []other:    Functional Scale:       Date assessed:  NDI: raw score = 17; dysfunction = 34%   3/29  NDI: raw score = 22; dysfunction = 44%     NDI: raw score = 12; dysfunction = 24%         Pain level:  10 -L lateral shoulder and elbow, cervicothoracic jxn    SUBJECTIVE:    Has been doing well since last week, just a little muscle soreness after ex's in [place]. Pain has been well controlled and B shoulder pain, however when she coughs, has radiating symptoms from cervicothoracic jxn to L elbow. Has tried to turn head opposite direction but still has shooting symptoms, alleviates after she finishes coughing.         OBJECTIVE: 15#  10#  30#  15#    20#    30#  40#  20#    10#   x1          1    2  1    1      1  1             20    15  10    20      15  15                                  Therapeutic Activities (75282)             5/7:  Discussed improved posture while driving and reducing dependency on LUE as she places hand on top of wheel, work station set up                                    [employer] re-education (48421)       Mat table  Supine  Chin tucks  HEP  scap retract  HEP  Shoulder flexion    Seated median nerve glides, wrist flex & ext    PEACE scap retract & protract                                  -unable to maintain good shld abd & UT compensations developed   Prone Klever's   -T's  -Y's                              Manual Intervention ([phone])      5/5:  **blue bolster placed perpendicular to spine at T1/C7 level throughout manual   Cerv mobs R rot mobs to C5  P/A mobs grade II-III C5-T2       ULNT:  Median glides  -elbow flex & ext  -wrist flex & ext  Radial glides  Ulnar glides           Supine stretches:  Upper trap  SCM  B pec minor stretch  Anterior scalene stretch  Upper cervical spine  3x30\"      SOR      STM B cervical paraspinals, B upper trap, B suboccipitals      Cervical distraction      R shoulder PROM into each direction, w/2\" hold at end range  4 mins    B pec stretch in door way  2x20\"              Modalities:   4/12:  Supine CP to posterior cervical spine and L shoulder. 4/9: deferred traction, pt plans to assess symptoms throughout the day and determine if traction helped or if massage from family helped her symptoms. Will review and determine appropriateness next session. 4/6:  Mechanical cervical traction 12#/8#, 30\" on / 10\" off, x10 mins w/bolsters. Patient was given panic button as well as instructed how to use it if experiencing pain. Patient was also given bell to call if anything is needed.        Patient education:  3/29:  -pt educated on diagnosis, prognosis and expectations for rehab  -all pt questions were answered    Home Exercise Program:     Access Code: [email or web address]  URL: [email or web address]  Date: 04/27/2021  Prepared by: Nafisa Uribe    Exercises  Pec Minor Stretch - 3 x daily - 7 x weekly - 3 sets - 30 hold  Sidelying Shoulder ER with Towel and Dumbbell - 2 x daily - 7 x weekly - 15 reps  Sidelying Shoulder Abduction Palm Forward - 2 x daily - 7 x weekly - 15 reps  Supine Scapular Protraction in Flexion with Dumbbells - 2 x daily - 7 x weekly - 15 reps    Access Code: [email or web address]  URL: [email or web address]  Date: 03/29/2021  Prepared by: Nafisared Uribe     Exercises  Supine Chin Tuck - 2 x daily - 7 x weekly - 10 reps  Seated Scapular Retraction - 2 x daily - 7 x weekly - 10 reps  Supine Shoulder Flexion Extension Full Range AROM - 2 x daily - 7 x weekly - 10 reps  Shoulder External Rotation and Scapular Retraction with Resistance - 2 x daily - 7 x weekly - 10 reps    Therapeutic Exercise and NMR EXR  [x] (24543) Provided verbal/tactile cueing for activities related to strengthening, flexibility, endurance, ROM  for improvements in cervical, postural, scapular, scapulothoracic and UE control with self care, reaching, carrying, lifting, house/yardwork, driving/computer work.    [] (96257) Provided verbal/tactile cueing for activities related to improving balance, coordination, kinesthetic sense, posture, motor skill, proprioception  to assist with cervical, scapular, scapulothoracic and UE control with self care, reaching, carrying, lifting, house/yardwork, driving/computer work.  [] (51368) Therapist is in constant attendance of 2 or more patients providing skilled therapy interventions, but not providing any significant amount of measurable one-on-one time to either patient, for improvements in cervical, scapular, scapulothoracic and UE control with self care, reaching, carrying, lifting, house/yardwork, driving, computer work.      Therapeutic Activities: [x] (82773 or 00067) Provided verbal/tactile cueing for activities related to improving balance, coordination, kinesthetic sense, posture, motor skill, proprioception and motor activation to allow for proper function of cervical, scapular, scapulothoracic and UE control with self care, carrying, lifting, driving/computer work.      Home Exercise Program:    [x] (12820) Reviewed/Progressed HEP activities related to strengthening, flexibility, endurance, ROM of cervical, scapular, scapulothoracic and UE control with self care, reaching, carrying, lifting, house/yardwork, driving/computer work  [] (33634) Reviewed/Progressed HEP activities related to improving balance, coordination, kinesthetic sense, posture, motor skill, proprioception of cervical, scapular, scapulothoracic and UE control with self care, reaching, carrying, lifting, house/yardwork, driving/computer work      Manual Treatments:  PROM / STM / Oscillations-Mobs:  G-I, II, III, IV (PA's, Inf., Post.)  [x] (66453) Provided manual therapy to mobilize soft tissue/joints of cervical/CT, scapular GHJ and UE for the purpose of decreasing headache, modulating pain, promoting relaxation,  increasing ROM, reducing/eliminating soft tissue swelling/inflammation/restriction, improving soft tissue extensibility and allowing for proper ROM for normal function with self care, reaching, carrying, lifting, house/yardwork, driving/computer work    Charges:  Timed Code Treatment Minutes: 45   Total Treatment Minutes: 45       [] EVAL - LOW (27143)   [] EVAL - MOD (14050)  [] EVAL - HIGH (88057)  [] RE-EVAL (50284)  [x] QA(63375) x  2    [] Ionto  [] NMR (06130) x       [] Vaso  [] Manual (45735) x       [] Ultrasound  [x] TA x  1     [] Mech Traction (76680)  [] Aquatic Therapy x      [] ES (un) (98258):   [] Home Management Training x  [] ES(attended) (33794)   [] Dry Needling 1-2 muscles (95297):  [] Dry Needling 3+ muscles (483056  [] Group:      [] Other: expected and requires additional follow up with physician  [] Other    Persisting Functional Limitations/Impairments:  []Sitting []Standing   []Walking []Squatting/bending    []Stairs []ADL's    []Transfers [x]Reaching  []Housework []Lifting  []Driving [x]Job related tasks  []Sports/Recreation  []Sleeping  []Other:    ASSESSMENT:      Cervical spine symptoms are progressing well, however B GH joint pain is slowly improving. Coughing most frequently increases cerv spine pain while reaching [place] and lifting objects increases shoulder pain. Continue to instruct on ex's in [place] to improve scapular stability and postural awareness. Expect dc in 3 sessions. Treatment/Activity Tolerance:  [x] Patient able to complete tx   [] Patient limited by fatique  [] Patient limited by pain   [] Patient limited by other medical complications  [] Other:     Prognosis: [x] Good [] Fair  [] Poor    Patient Requires Follow-up: [x] Yes  [] No    PLAN: See eval. PT 2x / week for 6 weeks. [x] Continue per plan of care [] Alter current plan (see comments)  [] Plan of care initiated [] Hold pending MD visit [] Discharge    Electronically signed by: Royanne Soulier, PT      Note: If patient does not return for scheduled/ recommended follow up visits, this note will serve as a discharge from care along with most recent update on progress.

## 2021-06-11 ENCOUNTER — HOSPITAL ENCOUNTER (OUTPATIENT)
Dept: PHYSICAL THERAPY | Age: 51
Setting detail: THERAPIES SERIES
Discharge: HOME | End: 2021-06-11
Payer: COMMERCIAL

## 2021-06-11 PROCEDURE — 97110 THERAPEUTIC EXERCISES: CPT

## 2021-06-11 NOTE — FLOWSHEET NOTE
Karan Lutz  Phone: [phone]   Fax: [phone]      Physical Therapy Treatment Note/ Progress Report:     Date:  2021    Patient Name:  Tatyana Che    :  1970  MRN: [id number]  Restrictions/Precautions:    Medical/Treatment Diagnosis Information:  · Diagnosis: M54.12 (ICD-10-CM) - Cervical radicular pain  · Treatment Diagnosis: Misalinged cervicothoracic spine with decreased stability, Pain w/cervical extension, Decreased scapular stability  Insurance/Certification information:  PT Insurance Information: Aetna. 120 combined visits  Physician Information:  Referring Practitioner: Janace Epley, MD  Plan of care signed (Y/N): [x]  Yes []  No     Date of Patient follow up with Physician:         Progress Report: [x]  Yes  []  No     Date Range for reporting period:  Beginnin21  Endin21    Progress report due (10 Rx/or 30 days whichever is less): visit #10 or  (date)     Recertification due (POC duration/ or 90 days whichever is less): visit # or 21(date)     Visit # Insurance Allowable Auth required? Date Range    +  120 combined visits []  Yes  [x]  No pcy     Latex Allergy:  [x]NO      []YES  Preferred Language for Healthcare:   [x]English       []other:    Functional Scale:       Date assessed:  NDI: raw score = 17; dysfunction = 34%   3/29  NDI: raw score = 22; dysfunction = 44%     NDI: raw score = 12; dysfunction = 24%         Pain level:  1/10 -L lateral shoulder and elbow, cervicothoracic jxn    SUBJECTIVE:     R shoulder is doing much better now, just having some stiffness. Feels that she'll be ready for dishcarge next week, nothing else special going on.         OBJECTIVE:   :  10 mins late   Observation:  :  L upper trap compensations w/seated LUE movements, is not present during supine LUE movements   Test measurements:    :  Strength  (AROM) Left Right   Shoulder Flex 4- (WFL) 4* (145 deg)   Shoulder Scap 4- (WFL) 4+ (126 deg)   Shoulder ER 4- (T3) 4+ (T3)   Shoulder IR  4 (T10) 4+ (T6)   *increased pain     4/13:  BP R arm 148/105 , L arm 154/96 HR 99   -wrote down for pt to take with her for PCP  4/20:  Pt 11 mins late    4/27:  CERV ROM       Cervical Flexion WNL     Cervical Extension WFL     Cervical SB WFL  -L UT stretch w/ R SB   Cervical rotation WFL      Strength / Myotomes Left   Shoulder Flex 4-   Shoulder Scap 4-    Shoulder ER 3+   Shoulder IR  4-         RESTRICTIONS/PRECAUTIONS:  HTN, pseudotumor cerebri    Exercises/Interventions:   Therapeutic Exercise (01481) Resistance / level Sets/sec Reps Notes   UBE  -fwd / bwd  2'/2'     Pulley's  -flex & abd  Unilateral pec stretch at wall     x20 ea    Cerv & scap stabs at wall  Chin tucks w/ball  Cervical SB  Cervical rotation w/ball  B scap retract  W's -back against wall  W to Y's  B shoulder flex/scap w/chin tuck  B shoulder abd w/chin tuck    Wall slides w/lift off  Wall slides  15  15                -pain in R shoulder but able to complete reps   Resistive bands  Seated B GH ER w/scap retract/ No money  Mid row  GH ext  [address] [employer] ER  Robber pose  High Row     Lime  Lime  Lime  lime  lime     Lime                 Added 5/7   Seated stretches       Prone series  NPV ?       Mat table  supine  Shoulder flex  Serratus punch    S/L shld abd   SL punch   S/L  GH ER  Sleeper stretch     Wand:  Flex  Chest press  [facility]   1#1#1#                                                   [facility]  Navigated 2 flights of stairs reciprocally w/HR    Cybex  Mid row  -horiz   -vert   Lat delt raise    Low row    FM  Mid row  Lat pull down  -row   -lat   Lat pull down w/indiv   Triceps     Bar bell lat shld raise to chin    TRX:  Mid row             15#  10#  20#  15#  0#      20#    30#  40#  30#  30#    12#   x1          1    1    1      1        1  1 20    20  20      20        20  20    20      15                              Therapeutic Activities ()             5/7:  Discussed improved posture while driving and reducing dependency on LUE as she places hand on top of wheel, work station set up                                    [employer] re-education (17302)       Mat table  Supine  Chin tucks  HEP  scap retract  HEP  Shoulder flexion    Seated median nerve glides, wrist flex & ext    PEACE scap retract & protract                                  -unable to maintain good shld abd & UT compensations developed   Prone Hughston's   -T's  -Y's                              Manual Intervention ([id number])      5/5:  **blue bolster placed perpendicular to spine at T1/C7 level throughout manual   Cerv mobs R rot mobs to C5  P/A mobs grade II-III C5-T2       ULNT:  Median glides  -elbow flex & ext  -wrist flex & ext  Radial glides  Ulnar glides           Supine stretches:  Upper trap  SCM  B pec minor stretch  Anterior scalene stretch  Upper cervical spine        SOR      STM B cervical paraspinals, B upper trap, B suboccipitals      Cervical distraction      R shoulder PROM into each direction, w/2\" hold at end range      B pec stretch in door way                Modalities:   4/12:  Supine CP to posterior cervical spine and L shoulder. 4/9: deferred traction, pt plans to assess symptoms throughout the day and determine if traction helped or if massage from family helped her symptoms. Will review and determine appropriateness next session. 4/6:  Mechanical cervical traction 12#/8#, 30\" on / 10\" off, x10 mins w/bolsters. Patient was given panic button as well as instructed how to use it if experiencing pain. Patient was also given bell to call if anything is needed.        Patient education:  3/29:  -pt educated on diagnosis, prognosis and expectations for rehab  -all pt questions were answered    Home Exercise Program:     Access Code: [email or web address]  URL: [email or web address]  Date: 04/27/2021  Prepared by: Lui Limerick    Exercises  Pec Minor Stretch - 3 x daily - 7 x weekly - 3 sets - 30 hold  Sidelying Shoulder ER with Towel and Dumbbell - 2 x daily - 7 x weekly - 15 reps  Sidelying Shoulder Abduction Palm Forward - 2 x daily - 7 x weekly - 15 reps  Supine Scapular Protraction in Flexion with Dumbbells - 2 x daily - 7 x weekly - 15 reps    Access Code: [email or web address]  URL: [email or web address]  Date: 03/29/2021  Prepared by: Lui Limerick     Exercises  Supine Chin Tuck - 2 x daily - 7 x weekly - 10 reps  Seated Scapular Retraction - 2 x daily - 7 x weekly - 10 reps  Supine Shoulder Flexion Extension Full Range AROM - 2 x daily - 7 x weekly - 10 reps  Shoulder External Rotation and Scapular Retraction with Resistance - 2 x daily - 7 x weekly - 10 reps    Therapeutic Exercise and NMR EXR  [x] (10979) Provided verbal/tactile cueing for activities related to strengthening, flexibility, endurance, ROM  for improvements in cervical, postural, scapular, scapulothoracic and UE control with self care, reaching, carrying, lifting, house/yardwork, driving/computer work.    [] (47691) Provided verbal/tactile cueing for activities related to improving balance, coordination, kinesthetic sense, posture, motor skill, proprioception  to assist with cervical, scapular, scapulothoracic and UE control with self care, reaching, carrying, lifting, house/yardwork, driving/computer work.  [] (42049) Therapist is in constant attendance of 2 or more patients providing skilled therapy interventions, but not providing any significant amount of measurable one-on-one time to either patient, for improvements in cervical, scapular, scapulothoracic and UE control with self care, reaching, carrying, lifting, house/yardwork, driving, computer work.      Therapeutic Activities:    [x] (89078 or 37563) Provided verbal/tactile cueing for activities related to improving balance, coordination, kinesthetic sense, posture, motor skill, proprioception and motor activation to allow for proper function of cervical, scapular, scapulothoracic and UE control with self care, carrying, lifting, driving/computer work.      Home Exercise Program:    [x] (09532) Reviewed/Progressed HEP activities related to strengthening, flexibility, endurance, ROM of cervical, scapular, scapulothoracic and UE control with self care, reaching, carrying, lifting, house/yardwork, driving/computer work  [] (05921) Reviewed/Progressed HEP activities related to improving balance, coordination, kinesthetic sense, posture, motor skill, proprioception of cervical, scapular, scapulothoracic and UE control with self care, reaching, carrying, lifting, house/yardwork, driving/computer work      Manual Treatments:  PROM / STM / Oscillations-Mobs:  G-I, II, III, IV (PA's, Inf., Post.)  [x] (76697) Provided manual therapy to mobilize soft tissue/joints of cervical/CT, scapular GHJ and UE for the purpose of decreasing headache, modulating pain, promoting relaxation,  increasing ROM, reducing/eliminating soft tissue swelling/inflammation/restriction, improving soft tissue extensibility and allowing for proper ROM for normal function with self care, reaching, carrying, lifting, house/yardwork, driving/computer work    Charges:  Timed Code Treatment Minutes: 35   Total Treatment Minutes: 35       [] EVAL - LOW (23702)   [] EVAL - MOD (25736)  [] EVAL - HIGH (42989)  [] RE-EVAL (56255)  [x] IP(75709) x  2    [] Ionto  [] NMR (01388) x       [] Vaso  [] Manual (71402) x       [] Ultrasound  [] TA x       [] Mech Traction (94694)  [] Aquatic Therapy x      [] ES (un) (71299):   [] Home Management Training x  [] ES(attended) (58675)   [] Dry Needling 1-2 muscles (51879):  [] Dry Needling 3+ muscles (253106  [] Group:      [] Other:     GOALS:  Patient stated goal: alleviate pain  [] Progressing: [] Met: [] Not Met: [] Adjusted    Therapist goals for Patient:   Short Term Goals: To be achieved in: 2 weeks  1. Independent in HEP and progression per patient tolerance, in order to prevent re-injury. [] Progressing: [x] Met: [] Not Met: [] Adjusted  2. Patient will have a decrease in pain to facilitate improvement in movement, function, and ADLs as indicated by Functional Deficits. [] Progressing: [x] Met: [] Not Met: [] Adjusted    Long Term Goals: To be achieved in: 6 weeks  1. Disability index score of 10% or less for the NDI to assist with reaching prior level of function. [x] Progressing: [] Met: [] Not Met: [] Adjusted  2. Patient will perform pain-free & maintain AROM of cervical/thoracic spine  to allow for proper joint functioning as indicated by patients Functional Deficits. [] Progressing: [x] Met: [] Not Met: [] Adjusted  3. Patient will demonstrate an increase in postural awareness and control and activation of deep cervical stabilizers to allow for proper functional mobility as indicated by patients Functional Deficits. [x] Progressing: [x] Met: [] Not Met: [] Adjusted  4. Patient will return to functional activities including sleeping without increased symptoms or restriction. [x] Progressing: [] Met: [] Not Met: [] Adjusted  5. Patient to demonstrate L shoulder complex strength of 4+/5 throughout in order to reach [place] & lift objects pain-free. [x] Progressing: [] Met: [] Not Met: [] Adjusted       Overall Progression Towards Functional goals/ Treatment Progress Update:  [x] Patient is progressing as expected towards functional goals listed. [] Progression is slowed due to complexities/Impairments listed. [] Progression has been slowed due to co-morbidities.   [] Plan just implemented, too soon to assess goals progression <30days   [] Goals require adjustment due to lack of progress  [] Patient is not progressing as expected and requires additional follow up with physician  [] Other    Persisting Functional Limitations/Impairments:  []Sitting []Standing   []Walking []Squatting/bending    []Stairs []ADL's    []Transfers [x]Reaching  []Housework []Lifting  []Driving [x]Job related tasks  []Sports/Recreation  []Sleeping  []Other:    ASSESSMENT:    Patient performing ex's too quickly and looses posture at times, but, with cues, is able to improve again. Continue education of ex's in [place] w/expected dc in 2 sessions. Treatment/Activity Tolerance:  [x] Patient able to complete tx   [] Patient limited by fatique  [] Patient limited by pain   [] Patient limited by other medical complications  [] Other:     Prognosis: [x] Good [] Fair  [] Poor    Patient Requires Follow-up: [x] Yes  [] No    PLAN: See pb. PT 2x / week for 6 weeks. [x] Continue per plan of care [] Alter current plan (see comments)  [] Plan of care initiated [] Hold pending MD visit [] Discharge    Electronically signed by: Sariah Martel PT      Note: If patient does not return for scheduled/ recommended follow up visits, this note will serve as a discharge from care along with most recent update on progress.

## 2021-06-16 ENCOUNTER — HOSPITAL ENCOUNTER (OUTPATIENT)
Dept: PHYSICAL THERAPY | Age: 51
Setting detail: THERAPIES SERIES
Discharge: HOME | End: 2021-06-16
Payer: COMMERCIAL

## 2021-06-16 NOTE — FLOWSHEET NOTE
[address] [facility]    Physical Therapy  Cancellation/No-show Note  Patient Name:  Bobby Pulido  :  1970   Date:  2021    Cancelled visits to date: 2  No-shows to date: 1    For today's appointment patient:  [x]  Cancelled ,   []  Rescheduled appointment  []  No-show      Reason given by patient:  []  Patient ill  []  Conflicting appointment  []  No transportation    [x]  Conflict with work  []  No reason given  []  Other:     Comments:      Phone call information:   []  Phone call made today to patient at _ time at number provided:      []  Patient answered, conversation as follows:    []  Patient did not answer, message left as follows:  []  Phone call not made today  [x]  Phone call not needed - pt contacted us to cancel and provided reason for cancellation.      Electronically signed by:  Emy Delacruz, PT, PT

## 2021-06-18 ENCOUNTER — HOSPITAL ENCOUNTER (OUTPATIENT)
Dept: PHYSICAL THERAPY | Age: 51
Setting detail: THERAPIES SERIES
Discharge: HOME | End: 2021-06-18
Payer: COMMERCIAL

## 2021-06-18 NOTE — FLOWSHEET NOTE
[address] [facility]    Physical Therapy  Cancellation/No-show Note  Patient Name:  Veronica Ch  :  1970   Date:  2021    Cancelled visits to date: 3  No-shows to date: 1    For today's appointment patient:  [x]  Cancelled , ,   []  Rescheduled appointment  []  No-show      Reason given by patient:  []  Patient ill  []  Conflicting appointment  []  No transportation    [x]  Conflict with work  []  No reason given  []  Other:     Comments:      Phone call information:   []  Phone call made today to patient at _ time at number provided:      []  Patient answered, conversation as follows:    []  Patient did not answer, message left as follows:  []  Phone call not made today  [x]  Phone call not needed - pt contacted us to cancel and provided reason for cancellation.      Electronically signed by:  Lisbet Naik, PT, PT

## 2021-06-21 ENCOUNTER — APPOINTMENT (OUTPATIENT)
Dept: PHYSICAL THERAPY | Age: 51
End: 2021-06-21
Payer: COMMERCIAL

## 2021-06-23 ENCOUNTER — HOSPITAL ENCOUNTER (OUTPATIENT)
Dept: PHYSICAL THERAPY | Age: 51
Setting detail: THERAPIES SERIES
Discharge: HOME | End: 2021-06-23
Payer: COMMERCIAL

## 2021-06-23 PROCEDURE — 97110 THERAPEUTIC EXERCISES: CPT

## 2021-06-23 NOTE — FLOWSHEET NOTE
Karan Lutz  Phone: [phone]   Fax: [phone]      Physical Therapy Treatment Note/ Progress Report:     Date:  2021    Patient Name:  Harry Broderick    :  1970  MRN: [id number]  Restrictions/Precautions:    Medical/Treatment Diagnosis Information:  · Diagnosis: M54.12 (ICD-10-CM) - Cervical radicular pain  · Treatment Diagnosis: Misalinged cervicothoracic spine with decreased stability, Pain w/cervical extension, Decreased scapular stability  Insurance/Certification information:  PT Insurance Information: [employer]. 120 combined visits  Physician Information:  Referring Practitioner: Javid Fraser MD  Plan of care signed (Y/N): [x]  Yes []  No     Date of Patient follow up with Physician:         Progress Report: []  Yes  [x]  No     Date Range for reporting period:  Beginnin21  Ending:      Progress report due (10 Rx/or 30 days whichever is less): visit #10 or 27 (date)     Recertification due (POC duration/ or 90 days whichever is less): visit # or 21(date)     Visit # Insurance Allowable Auth required? Date Range    +  120 combined visits []  Yes  [x]  No pcy     Latex Allergy:  [x]NO      []YES  Preferred Language for Healthcare:   [x]English       []other:    Functional Scale:       Date assessed:  NDI: raw score = 17; dysfunction = 34%   3/29  NDI: raw score = 22; dysfunction = 44%     NDI: raw score = 12; dysfunction = 24%         Pain level:  10 -L lateral shoulder and elbow, cervicothoracic jxn    SUBJECTIVE:     Has been doing better and pain has been good since last session, has had to miss several sessions due to work.   Feels that she can be finished         OBJECTIVE:   :  10 mins late   Observation:  :  L upper trap compensations w/seated LUE movements, is not present during supine LUE movements   Test measurements:    :  Strength  (AROM) Left Right Shoulder Flex 4- (WFL) 4* (145 deg)   Shoulder Scap 4- (WFL) 4+ (126 deg)   Shoulder ER 4- (T3) 4+ (T3)   Shoulder IR  4 (T10) 4+ (T6)   *increased pain     4/13:  BP R arm 148/105 , L arm 154/96 HR 99   -wrote down for pt to take with her for PCP  4/20:  Pt 11 mins late    4/27:  CERV ROM       Cervical Flexion WNL     Cervical Extension WFL     Cervical SB WFL  -L UT stretch w/ R SB   Cervical rotation WFL      Strength / Myotomes Left   Shoulder Flex 4-   Shoulder Scap 4-    Shoulder ER 3+   Shoulder IR  4-         RESTRICTIONS/PRECAUTIONS:  HTN, pseudotumor cerebri    Exercises/Interventions:   Therapeutic Exercise (04965) Resistance / level Sets/sec Reps Notes   UBE  -fwd / bwd  2'/2'     Pulley's  -flex & abd  Unilateral pec stretch at wall         Cerv & scap stabs at wall  Chin tucks w/ball  Cervical SB  Cervical rotation w/ball  B scap retract  W's -back against wall  W to Y's  B shoulder flex/scap w/chin tuck  B shoulder abd w/chin tuck    Wall slides w/lift off  Wall slides  15  15                -pain in R shoulder but able to complete reps   Resistive bands  Seated B GH ER w/scap retract/ No money  Mid row  GH ext  [facility] ER  Robber pose  High Row     Lime  Lime  Lime  lime  lime     Lime                 Added 5/7   Seated stretches       Prone series        Mat table  supine  Shoulder flex  Serratus punch    S/L shld abd   SL punch   S/L  GH ER  Sleeper stretch     Wand:  Flex  Chest press  [facility] ER/IR   1#1#1#                                                   [facility]  Navigated 2 flights of stairs reciprocally w/HR    Cybex  Mid row  -horiz   -vert   Lat delt raise    Low row  Chest butterfly     FM  Mid row  Lat pull down  -row   -lat   Lat pull down w/indiv   Triceps   High Row  Abdominal crunch    Bar bell lat shld raise to chin    TRX:  Mid row             15#  10#  20#  15#  0#  1.5 pl      20#    30#  40#  20#  30#  20#  20#    12# N57  04  728  7814  15  6/23: Reviewed cybex mid row, lat delt raise, low row                           Therapeutic Activities (54466)             5/7:  Discussed improved posture while driving and reducing dependency on LUE as she places hand on top of wheel, work station set up                                    [employer] re-education (18353)       Mat table  Supine  Chin tucks  HEP  scap retract  HEP  Shoulder flexion    Seated median nerve glides, wrist flex & ext    PEACE scap retract & protract                                  -unable to maintain good shld abd & UT compensations developed   Prone Hughston's   -T's  -Y's                              Manual Intervention (52196)      5/5:  **blue bolster placed perpendicular to spine at T1/C7 level throughout manual   Cerv mobs R rot mobs to C5  P/A mobs grade II-III C5-T2       ULNT:  Median glides  -elbow flex & ext  -wrist flex & ext  Radial glides  Ulnar glides           Supine stretches:  Upper trap  SCM  B pec minor stretch  Anterior scalene stretch  Upper cervical spine        SOR      STM B cervical paraspinals, B upper trap, B suboccipitals      Cervical distraction      R shoulder PROM into each direction, w/2\" hold at end range      B pec stretch in door way                Modalities:   4/12:  Supine CP to posterior cervical spine and L shoulder. 4/9: deferred traction, pt plans to assess symptoms throughout the day and determine if traction helped or if massage from family helped her symptoms. Will review and determine appropriateness next session. 4/6:  Mechanical cervical traction 12#/8#, 30\" on / 10\" off, x10 mins w/bolsters. Patient was given panic button as well as instructed how to use it if experiencing pain. Patient was also given bell to call if anything is needed.        Patient education:  3/29:  -pt educated on diagnosis, prognosis and expectations for rehab  -all pt questions were answered    Home Exercise Program:     Access Code: [email or web address]  URL: [email or web address]  Date: 04/27/2021  Prepared by: Ceci Hali    Exercises  Pec Minor Stretch - 3 x daily - 7 x weekly - 3 sets - 30 hold  Sidelying Shoulder ER with Towel and Dumbbell - 2 x daily - 7 x weekly - 15 reps  Sidelying Shoulder Abduction Palm Forward - 2 x daily - 7 x weekly - 15 reps  Supine Scapular Protraction in Flexion with Dumbbells - 2 x daily - 7 x weekly - 15 reps    Access Code: [email or web address]  URL: [email or web address]  Date: 03/29/2021  Prepared by: Ceci Hali     Exercises  Supine Chin Tuck - 2 x daily - 7 x weekly - 10 reps  Seated Scapular Retraction - 2 x daily - 7 x weekly - 10 reps  Supine Shoulder Flexion Extension Full Range AROM - 2 x daily - 7 x weekly - 10 reps  Shoulder External Rotation and Scapular Retraction with Resistance - 2 x daily - 7 x weekly - 10 reps    Therapeutic Exercise and NMR EXR  [x] (11011) Provided verbal/tactile cueing for activities related to strengthening, flexibility, endurance, ROM  for improvements in cervical, postural, scapular, scapulothoracic and UE control with self care, reaching, carrying, lifting, house/yardwork, driving/computer work.    [] (36053) Provided verbal/tactile cueing for activities related to improving balance, coordination, kinesthetic sense, posture, motor skill, proprioception  to assist with cervical, scapular, scapulothoracic and UE control with self care, reaching, carrying, lifting, house/yardwork, driving/computer work.  [] (07636) Therapist is in constant attendance of 2 or more patients providing skilled therapy interventions, but not providing any significant amount of measurable one-on-one time to either patient, for improvements in cervical, scapular, scapulothoracic and UE control with self care, reaching, carrying, lifting, house/yardwork, driving, computer work.      Therapeutic Activities:    [x] (63228 or 85135) Provided verbal/tactile cueing for activities related to improving balance, coordination, kinesthetic sense, posture, motor skill, proprioception and motor activation to allow for proper function of cervical, scapular, scapulothoracic and UE control with self care, carrying, lifting, driving/computer work.      Home Exercise Program:    [x] (07811) Reviewed/Progressed HEP activities related to strengthening, flexibility, endurance, ROM of cervical, scapular, scapulothoracic and UE control with self care, reaching, carrying, lifting, house/yardwork, driving/computer work  [] (17379) Reviewed/Progressed HEP activities related to improving balance, coordination, kinesthetic sense, posture, motor skill, proprioception of cervical, scapular, scapulothoracic and UE control with self care, reaching, carrying, lifting, house/yardwork, driving/computer work      Manual Treatments:  PROM / STM / Oscillations-Mobs:  G-I, II, III, IV (PA's, Inf., Post.)  [x] (10300) Provided manual therapy to mobilize soft tissue/joints of cervical/CT, scapular GHJ and UE for the purpose of decreasing headache, modulating pain, promoting relaxation,  increasing ROM, reducing/eliminating soft tissue swelling/inflammation/restriction, improving soft tissue extensibility and allowing for proper ROM for normal function with self care, reaching, carrying, lifting, house/yardwork, driving/computer work    Charges:  Timed Code Treatment Minutes: 28   Total Treatment Minutes: 28       [] EVAL - LOW (77837)   [] EVAL - MOD (47187)  [] EVAL - HIGH (32775)  [] RE-EVAL (07698)  [x] VN(20509) x  2    [] Ionto  [] NMR (36286) x       [] Vaso  [] Manual (51944) x       [] Ultrasound  [] TA x       [] Mech Traction (62389)  [] Aquatic Therapy x      [] ES (un) (81435):   [] Home Management Training x  [] ES(attended) (82970)   [] Dry Needling 1-2 muscles (51340):  [] Dry Needling 3+ muscles (425189  [] Group:      [] Other:     GOALS:  Patient stated goal: alleviate pain  [] Progressing: [] Met: [] Not Met: [] Adjusted    Therapist goals for Patient:   Short Term Goals: To be achieved in: 2 weeks  1. Independent in HEP and progression per patient tolerance, in order to prevent re-injury. [] Progressing: [x] Met: [] Not Met: [] Adjusted  2. Patient will have a decrease in pain to facilitate improvement in movement, function, and ADLs as indicated by Functional Deficits. [] Progressing: [x] Met: [] Not Met: [] Adjusted    Long Term Goals: To be achieved in: 6 weeks  1. Disability index score of 10% or less for the NDI to assist with reaching prior level of function. [x] Progressing: [] Met: [] Not Met: [] Adjusted  2. Patient will perform pain-free & maintain AROM of cervical/thoracic spine  to allow for proper joint functioning as indicated by patients Functional Deficits. [] Progressing: [x] Met: [] Not Met: [] Adjusted  3. Patient will demonstrate an increase in postural awareness and control and activation of deep cervical stabilizers to allow for proper functional mobility as indicated by patients Functional Deficits. [x] Progressing: [x] Met: [] Not Met: [] Adjusted  4. Patient will return to functional activities including sleeping without increased symptoms or restriction. [x] Progressing: [] Met: [] Not Met: [] Adjusted  5. Patient to demonstrate L shoulder complex strength of 4+/5 throughout in order to reach [place] & lift objects pain-free. [x] Progressing: [] Met: [] Not Met: [] Adjusted       Overall Progression Towards Functional goals/ Treatment Progress Update:  [x] Patient is progressing as expected towards functional goals listed. [] Progression is slowed due to complexities/Impairments listed. [] Progression has been slowed due to co-morbidities.   [] Plan just implemented, too soon to assess goals progression <30days   [] Goals require adjustment due to lack of progress  [] Patient is not progressing as expected and requires additional follow up with physician  [] Other    Persisting Functional Limitations/Impairments:  []Sitting []Standing   []Walking []Squatting/bending    []Stairs []ADL's    []Transfers [x]Reaching  []Housework []Lifting  []Driving [x]Job related tasks  []Sports/Recreation  []Sleeping  []Other:    ASSESSMENT:      Pt demo'd improved ability to assume correct posture with ex's in [place] this date. Planned discharge next session, pt does report increased work meetings, may have to again cancel that session and is unsure if she'll be able to reschedule. Informed pt she can  free 30 day [place] membership if she is unable to attend her last scheduled session. Additional education of ex's in [place] as time permits next session. Treatment/Activity Tolerance:  [x] Patient able to complete tx   [] Patient limited by fatique  [] Patient limited by pain   [] Patient limited by other medical complications  [] Other:     Prognosis: [x] Good [] Fair  [] Poor    Patient Requires Follow-up: [x] Yes  [] No    PLAN: See eval. PT 2x / week for 6 weeks. [x] Continue per plan of care [] Alter current plan (see comments)  [] Plan of care initiated [] Hold pending MD visit [] Discharge    Electronically signed by: Sariah Martel, PT      Note: If patient does not return for scheduled/ recommended follow up visits, this note will serve as a discharge from care along with most recent update on progress.

## 2021-06-24 ENCOUNTER — HOSPITAL ENCOUNTER (OUTPATIENT)
Dept: MAMMOGRAPHY | Age: 51
Discharge: HOME | End: 2021-06-28
Payer: COMMERCIAL

## 2021-06-24 VITALS — HEIGHT: 64 IN | WEIGHT: 250 LBS | BODY MASS INDEX: 42.68 KG/M2

## 2021-06-24 DIAGNOSIS — Z12.31 VISIT FOR SCREENING MAMMOGRAM: ICD-10-CM

## 2021-06-24 PROCEDURE — 77063 BREAST TOMOSYNTHESIS BI: CPT

## 2021-06-30 ENCOUNTER — HOSPITAL ENCOUNTER (OUTPATIENT)
Dept: PHYSICAL THERAPY | Age: 51
Setting detail: THERAPIES SERIES
Discharge: HOME | End: 2021-06-30
Payer: COMMERCIAL

## 2021-06-30 NOTE — FLOWSHEET NOTE
[address] [facility]    Physical Therapy  Cancellation/No-show Note  Patient Name:  Laz Zarco  :  1970   Date:  2021    Cancelled visits to date: 3  No-shows to date: 2    For today's appointment patient:  []  Cancelled , ,   []  Rescheduled appointment  [x]  No-show ,      Reason given by patient:  []  Patient ill  []  Conflicting appointment  []  No transportation    []  Conflict with work  []  No reason given  []  Other:     Comments:      Phone call information:   []  Phone call made today to patient at _ time at number provided:      []  Patient answered, conversation as follows:    []  Patient did not answer, message left as follows:  [x]  Phone call not made today  []  Phone call not needed - pt contacted us to cancel and provided reason for cancellation.      Electronically signed by:  Shemar Swan, PT, PT

## 2021-07-09 ENCOUNTER — OFFICE VISIT (OUTPATIENT)
Dept: INTERNAL MEDICINE CLINIC | Age: 51
End: 2021-07-09
Payer: COMMERCIAL

## 2021-07-09 VITALS
HEART RATE: 96 BPM | TEMPERATURE: 97.6 F | BODY MASS INDEX: 42.95 KG/M2 | DIASTOLIC BLOOD PRESSURE: 68 MMHG | SYSTOLIC BLOOD PRESSURE: 114 MMHG | OXYGEN SATURATION: 96 % | WEIGHT: 251.6 LBS | HEIGHT: 64 IN

## 2021-07-09 DIAGNOSIS — R53.83 FATIGUE, UNSPECIFIED TYPE: ICD-10-CM

## 2021-07-09 DIAGNOSIS — I10 ESSENTIAL HYPERTENSION: ICD-10-CM

## 2021-07-09 DIAGNOSIS — I10 ESSENTIAL HYPERTENSION: Primary | ICD-10-CM

## 2021-07-09 LAB
A/G RATIO: 1.5 (ref 1.1–2.2)
ALBUMIN SERPL-MCNC: 4.4 G/DL (ref 3.4–5)
ALP BLD-CCNC: 86 U/L (ref 40–129)
ALT SERPL-CCNC: 17 U/L (ref 10–40)
ANION GAP SERPL CALCULATED.3IONS-SCNC: 13 MMOL/L (ref 3–16)
AST SERPL-CCNC: 17 U/L (ref 15–37)
ATYPICAL LYMPHOCYTE RELATIVE PERCENT: 1 % (ref 0–6)
BACTERIA: ABNORMAL /HPF
BANDED NEUTROPHILS RELATIVE PERCENT: 1 % (ref 0–7)
BASOPHILS ABSOLUTE: 0.1 K/UL (ref 0–0.2)
BASOPHILS RELATIVE PERCENT: 1 %
BILIRUB SERPL-MCNC: <0.2 MG/DL (ref 0–1)
BILIRUBIN URINE: NEGATIVE
BLOOD, URINE: NEGATIVE
BUN BLDV-MCNC: 18 MG/DL (ref 7–20)
CALCIUM SERPL-MCNC: 9.2 MG/DL (ref 8.3–10.6)
CHLORIDE BLD-SCNC: 105 MMOL/L (ref 99–110)
CLARITY: ABNORMAL
CO2: 22 MMOL/L (ref 21–32)
COLOR: YELLOW
COMMENT UA: ABNORMAL
CREAT SERPL-MCNC: 1.1 MG/DL (ref 0.6–1.1)
EOSINOPHILS ABSOLUTE: 0.1 K/UL (ref 0–0.6)
EOSINOPHILS RELATIVE PERCENT: 2 %
EPITHELIAL CELLS, UA: 21 /HPF (ref 0–5)
FOLATE: >20 NG/ML (ref 4.78–24.2)
GFR AFRICAN AMERICAN: >60
GFR NON-AFRICAN AMERICAN: 52
GLOBULIN: 3 G/DL
GLUCOSE BLD-MCNC: 108 MG/DL (ref 70–99)
GLUCOSE URINE: NEGATIVE MG/DL
HCT VFR BLD CALC: 42.8 % (ref 36–48)
HEMOGLOBIN: 14.1 G/DL (ref 12–16)
HYALINE CASTS: 1 /LPF (ref 0–8)
KETONES, URINE: NEGATIVE MG/DL
LEUKOCYTE ESTERASE, URINE: ABNORMAL
LYMPHOCYTES ABSOLUTE: 2.4 K/UL (ref 1–5.1)
LYMPHOCYTES RELATIVE PERCENT: 41 %
MCH RBC QN AUTO: 33.6 PG (ref 26–34)
MCHC RBC AUTO-ENTMCNC: 33 G/DL (ref 31–36)
MCV RBC AUTO: 101.8 FL (ref 80–100)
MICROSCOPIC EXAMINATION: YES
MONOCYTES ABSOLUTE: 0.6 K/UL (ref 0–1.3)
MONOCYTES RELATIVE PERCENT: 11 %
NEUTROPHILS ABSOLUTE: 2.5 K/UL (ref 1.7–7.7)
NEUTROPHILS RELATIVE PERCENT: 43 %
NITRITE, URINE: NEGATIVE
PDW BLD-RTO: 14.6 % (ref 12.4–15.4)
PH UA: 7 (ref 5–8)
PLATELET # BLD: 241 K/UL (ref 135–450)
PMV BLD AUTO: 8.3 FL (ref 5–10.5)
POTASSIUM SERPL-SCNC: 4 MMOL/L (ref 3.5–5.1)
PROTEIN UA: NEGATIVE MG/DL
RBC # BLD: 4.21 M/UL (ref 4–5.2)
RBC # BLD: NORMAL 10*6/UL
RBC UA: 1 /HPF (ref 0–4)
SODIUM BLD-SCNC: 140 MMOL/L (ref 136–145)
SPECIFIC GRAVITY UA: 1.02 (ref 1–1.03)
TOTAL PROTEIN: 7.4 G/DL (ref 6.4–8.2)
TSH SERPL DL<=0.05 MIU/L-ACNC: 1.13 UIU/ML (ref 0.27–4.2)
URINE TYPE: ABNORMAL
UROBILINOGEN, URINE: 0.2 E.U./DL
VITAMIN B-12: 1135 PG/ML (ref 211–911)
WBC # BLD: 5.6 K/UL (ref 4–11)
WBC UA: 13 /HPF (ref 0–5)

## 2021-07-09 PROCEDURE — 99213 OFFICE O/P EST LOW 20 MIN: CPT | Performed by: INTERNAL MEDICINE

## 2021-07-09 RX ORDER — ESTRADIOL 0.05 MG/D
1 FILM, EXTENDED RELEASE TRANSDERMAL
COMMUNITY
Start: 2021-04-26

## 2021-07-09 ASSESSMENT — ENCOUNTER SYMPTOMS
FACIAL SWELLING: 0
EYE PAIN: 0
COUGH: 0
CHOKING: 0
EYE REDNESS: 0

## 2021-07-09 NOTE — PROGRESS NOTES
Sarika Daniels (:  1970) is a 46 y.o. female,Established patient, here for evaluation of the following chief complaint(s):  Hypertension         ASSESSMENT/PLAN:  1. Essential hypertension  -     Comprehensive Metabolic Panel; Future  -     Urinalysis; Future  2. Fatigue, unspecified type  -     CBC Auto Differential; Future  -     Vitamin B12 & Folate; Future  -     TSH without Reflex; Future      Return in about 6 months (around 2022) for Annual Physical.         Subjective   SUBJECTIVE/OBJECTIVE:  HPI   Hypertension:  Home blood pressure monitoring: No.  She is adherent to a low sodium diet. Patient denies chest pain, shortness of breath and headache. Antihypertensive medication side effects: no medication side effects noted. Use of agents associated with hypertension: none. Sodium (mmol/L)   Date Value   2021 138    BUN (mg/dL)   Date Value   2021 24 (H)    Glucose (mg/dL)   Date Value   2021 94      Potassium reflex Magnesium (mmol/L)   Date Value   2021 3.6    CREATININE (mg/dL)   Date Value   2021 1.1           Review of Systems   Constitutional: Positive for fatigue. Negative for diaphoresis. HENT: Negative for ear discharge and facial swelling. Eyes: Negative for pain and redness. Respiratory: Negative for cough and choking. Cardiovascular: Negative for chest pain and leg swelling.           Objective    Vitals:    21 0859 21 0910   BP: 116/68 114/68   Site: Right Upper Arm    Position: Sitting    Cuff Size: Large Adult    Pulse: 96    Temp: 97.6 °F (36.4 °C)    TempSrc: Infrared    SpO2: 96%    Weight: 251 lb 9.6 oz (114.1 kg)    Height: 5' 4\" (1.626 m)       Wt Readings from Last 3 Encounters:   21 251 lb 9.6 oz (114.1 kg)   21 250 lb (113.4 kg)   21 250 lb (113.4 kg)     BP Readings from Last 3 Encounters:   21 114/68   21 139/84   21 116/72     Body mass index is 43.19 kg/m². Facility age limit for growth percentiles is 20 years. Physical Exam  Constitutional:       Appearance: Normal appearance. HENT:      Right Ear: Tympanic membrane and ear canal normal.      Left Ear: Ear canal normal.      Nose: Nose normal. No congestion or rhinorrhea. Mouth/Throat:      Mouth: Mucous membranes are moist.      Pharynx: No oropharyngeal exudate or posterior oropharyngeal erythema. Eyes:      General:         Right eye: No discharge. Extraocular Movements: Extraocular movements intact. Pupils: Pupils are equal, round, and reactive to light. Cardiovascular:      Rate and Rhythm: Normal rate and regular rhythm. Pulses: Normal pulses. Heart sounds: Normal heart sounds. Pulmonary:      Effort: Pulmonary effort is normal. No respiratory distress. Breath sounds: Normal breath sounds. No stridor. No wheezing or rhonchi. Musculoskeletal:      Cervical back: Normal range of motion and neck supple. No rigidity or tenderness. Neurological:      Mental Status: She is alert. An electronic signature was used to authenticate this note.     --Nury Zacarias MD

## 2021-07-10 RX ORDER — CIPROFLOXACIN 500 MG/1
500 TABLET, FILM COATED ORAL 2 TIMES DAILY
Qty: 14 TABLET | Refills: 0 | Status: SHIPPED | OUTPATIENT
Start: 2021-07-10 | End: 2021-07-17

## 2021-07-12 ENCOUNTER — PATIENT MESSAGE (OUTPATIENT)
Dept: INTERNAL MEDICINE CLINIC | Age: 51
End: 2021-07-12

## 2021-07-12 DIAGNOSIS — R09.81 CONGESTION OF NASAL SINUS: Primary | ICD-10-CM

## 2021-07-12 RX ORDER — PROMETHAZINE HYDROCHLORIDE AND PHENYLEPHRINE HYDROCHLORIDE 6.25; 5 MG/5ML; MG/5ML
5 SYRUP ORAL EVERY 6 HOURS
Qty: 240 ML | Refills: 0 | Status: SHIPPED | OUTPATIENT
Start: 2021-07-12 | End: 2021-09-28

## 2021-07-12 RX ORDER — AZITHROMYCIN 250 MG/1
250 TABLET, FILM COATED ORAL SEE ADMIN INSTRUCTIONS
Qty: 6 TABLET | Refills: 0 | Status: SHIPPED | OUTPATIENT
Start: 2021-07-12 | End: 2021-07-17

## 2021-07-12 NOTE — TELEPHONE ENCOUNTER
From: Nohemi Sainz  To: Summer Ramos MD  Sent: 7/12/2021 9:30 AM EDT  Subject: Prescription Question    Need med for cold. Green mucus and cough. Stuffy nose.

## 2021-08-04 ENCOUNTER — OFFICE VISIT (OUTPATIENT)
Dept: NEUROLOGY | Age: 51
End: 2021-08-04
Payer: COMMERCIAL

## 2021-08-04 VITALS
HEIGHT: 64 IN | BODY MASS INDEX: 44.22 KG/M2 | HEART RATE: 92 BPM | SYSTOLIC BLOOD PRESSURE: 143 MMHG | DIASTOLIC BLOOD PRESSURE: 87 MMHG | WEIGHT: 259 LBS

## 2021-08-04 DIAGNOSIS — G93.2 PSEUDOTUMOR CEREBRI: Primary | ICD-10-CM

## 2021-08-04 PROCEDURE — 99213 OFFICE O/P EST LOW 20 MIN: CPT | Performed by: PSYCHIATRY & NEUROLOGY

## 2021-08-04 RX ORDER — ACETAZOLAMIDE 250 MG/1
TABLET ORAL
Qty: 180 TABLET | Refills: 1 | Status: SHIPPED | OUTPATIENT
Start: 2021-08-04 | End: 2022-02-11 | Stop reason: SDUPTHER

## 2021-08-04 NOTE — PROGRESS NOTES
Dahlia Reza   Neurology followup    Subjective:   CC/HP  History was obtained from the patient. Interval history:  Patient is doing well. She has not had any significant headaches. Patient was seen by her ophthalmologist  2 months ago and he feels her eyes are stable   No other neurological complaints  Details of her history:  Patient has known pseudotumor cerebri.   Patient was seen by her optometrist in July 2017 and was found to have very mild papilledema bilaterally  Lumbar puncture in 2011 head showed an opening pressure of 35 cm of CSF but the diagnosis was made        REVIEW OF SYSTEMS    Constitutional:  []   Chills   []  Fatigue   []  Fevers   []  Malaise   []  Weight loss     [x] Denies all of the above    Respiratory:   []  Cough    []  Shortness of breath         [x] Denies all of the above     Cardiovascular:   []  Chest pain    []  Exertional chest pressure/discomfort           [] Palpitations    []  Syncope     [x] Denies all of the above        Past Medical History:   Diagnosis Date    Achilles rupture     repaired    Asthma     very mild, normal PFTs    Hyperlipidemia     Hypertension     Murmur, heart     Obstructive sleep apnea (adult) (pediatric)     Pericarditis     Prolonged emergence from general anesthesia     Pseudotumor cerebri 12/21/2011     Family History   Problem Relation Age of Onset    Cancer Father     Diabetes Father     Heart Failure Father     Hypertension Father     Stroke Father     Migraines Father     Other Father         HANK    Kidney Disease Father     Coronary Art Dis Brother     Other Sister         HANK    Diabetes Sister     Other Mother         fibromyalgia    Osteoporosis Mother     Diabetes Mother     Breast Cancer Mother         over 48     Social History     Socioeconomic History    Marital status: Single     Spouse name: Not on file    Number of children: Not on file    Years of education: Not on file    Highest education level: Not on file   Occupational History    Not on file   Tobacco Use    Smoking status: Never Smoker    Smokeless tobacco: Never Used   Vaping Use    Vaping Use: Never used   Substance and Sexual Activity    Alcohol use: Yes     Alcohol/week: 1.0 - 2.0 standard drinks     Types: 1 Standard drinks or equivalent per week    Drug use: No    Sexual activity: Yes     Partners: Male   Other Topics Concern    Not on file   Social History Narrative    Not on file     Social Determinants of Health     Financial Resource Strain: Low Risk     Difficulty of Paying Living Expenses: Not hard at all   Food Insecurity: No Food Insecurity    Worried About [facility] in the Last Year: Never true    [facility] in the Last Year: Never true   Transportation Needs: No Transportation Needs    Lack of Transportation (Medical): No    Lack of Transportation (Non-Medical): No   Physical Activity:     Days of Exercise per Week:     Minutes of Exercise per Session:    Stress:     Feeling of Stress :    Social Connections:     Frequency of Communication with Friends and Family:     Frequency of Social Gatherings with Friends and Family:     Attends Alevism Services:     Active Member of Clubs or Organizations:     Attends Club or Organization Meetings:     Marital Status:    Intimate Partner Violence:     Fear of Current or Ex-Partner:     Emotionally Abused:     Physically Abused:     Sexually Abused:         Objective:  Exam:  BP (!) 143/87   Pulse 92   Ht 5' 4\" (1.626 m)   Wt 251 lb (113.9 kg)   BMI 43.08 kg/m²   This is a well-nourished patient in no acute distress  Patient is awake, alert and oriented x3. Speech is normal.  Pupils are equal round reacting to light. Extraocular movements intact. Face symmetrical. Tongue midline. Motor exam shows normal symmetrical strength. Deep tendon reflexes normal. Plantar reflexes downgoing. Sensory exam normal. Coordination normal. Gait normal. No carotid bruit. No neck stiffness. Impression :  Pseudotumor cerebri, doing well    Plan :  Continue acetazolamide 250 mg, one tablet b.i.d. Recommended that patient continue to lose weight        Please note a portion of  this chart was generated using dragon dictation software. Although every effort was made to ensure the accuracy of this automated transcription, some errors in transcription may have occurred.

## 2021-08-09 NOTE — TELEPHONE ENCOUNTER
Patient is requesting a refill of their prescription. Requested Prescriptions     Pending Prescriptions Disp Refills    rosuvastatin (CRESTOR) 20 MG tablet 90 tablet 2     Sig: Take 1 tablet by mouth daily        Recent Visits  Date Type Provider Dept   07/09/21 Office Visit Nury Zacarias MD [facility]   03/05/21 Office Visit Nury Zacarias MD [facility]   01/26/21 Office Visit Silver Aggarwal MD [facility]   06/29/20 Office Visit Nury Zacarias MD [facility]   Showing recent visits within past 540 days with a meds authorizing provider and meeting all other requirements  Future Appointments  No visits were found meeting these conditions.   Showing future appointments within next 150 days with a meds authorizing provider and meeting all other requirements     7/9/2021

## 2021-08-11 RX ORDER — ROSUVASTATIN CALCIUM 20 MG/1
20 TABLET, COATED ORAL DAILY
Qty: 90 TABLET | Refills: 2 | Status: SHIPPED | OUTPATIENT
Start: 2021-08-11 | End: 2022-08-20 | Stop reason: SDUPTHER

## 2021-09-28 ENCOUNTER — APPOINTMENT (OUTPATIENT)
Dept: GENERAL RADIOLOGY | Age: 51
End: 2021-09-28
Payer: COMMERCIAL

## 2021-09-28 ENCOUNTER — APPOINTMENT (OUTPATIENT)
Dept: CT IMAGING | Age: 51
End: 2021-09-28
Payer: COMMERCIAL

## 2021-09-28 ENCOUNTER — NURSE TRIAGE (OUTPATIENT)
Dept: OTHER | Facility: CLINIC | Age: 51
End: 2021-09-28

## 2021-09-28 ENCOUNTER — HOSPITAL ENCOUNTER (OUTPATIENT)
Age: 51
Setting detail: OBSERVATION
Discharge: HOME | End: 2021-09-29
Attending: STUDENT IN AN ORGANIZED HEALTH CARE EDUCATION/TRAINING PROGRAM
Payer: COMMERCIAL

## 2021-09-28 DIAGNOSIS — J30.1 SEASONAL ALLERGIC RHINITIS DUE TO POLLEN: ICD-10-CM

## 2021-09-28 DIAGNOSIS — R09.81 CONGESTION OF NASAL SINUS: ICD-10-CM

## 2021-09-28 PROBLEM — R20.0 LEFT ARM NUMBNESS: Status: ACTIVE | Noted: 2021-09-28

## 2021-09-28 LAB
ALBUMIN SERPL-MCNC: 4.2 G/DL (ref 3.4–5)
ALP BLD-CCNC: 74 U/L (ref 40–129)
ALT SERPL-CCNC: 16 U/L (ref 10–40)
ANION GAP SERPL CALCULATED.3IONS-SCNC: 14 MMOL/L (ref 3–16)
AST SERPL-CCNC: 18 U/L (ref 15–37)
BACTERIA: ABNORMAL /HPF
BASOPHILS ABSOLUTE: 0 K/UL (ref 0–0.2)
BASOPHILS RELATIVE PERCENT: 0.3 %
BILIRUB SERPL-MCNC: 0.3 MG/DL (ref 0–1)
BILIRUBIN DIRECT: <0.2 MG/DL (ref 0–0.3)
BILIRUBIN URINE: NEGATIVE
BILIRUBIN, INDIRECT: NORMAL MG/DL (ref 0–1)
BLOOD, URINE: NEGATIVE
BUN BLDV-MCNC: 18 MG/DL (ref 7–20)
CALCIUM SERPL-MCNC: 9.7 MG/DL (ref 8.3–10.6)
CHLORIDE BLD-SCNC: 101 MMOL/L (ref 99–110)
CLARITY: ABNORMAL
CO2: 22 MMOL/L (ref 21–32)
COLOR: YELLOW
CREAT SERPL-MCNC: 1.2 MG/DL (ref 0.6–1.1)
EOSINOPHILS ABSOLUTE: 0 K/UL (ref 0–0.6)
EOSINOPHILS RELATIVE PERCENT: 0.8 %
EPITHELIAL CELLS, UA: 16 /HPF (ref 0–5)
GFR AFRICAN AMERICAN: 57
GFR NON-AFRICAN AMERICAN: 47
GLUCOSE BLD-MCNC: 152 MG/DL (ref 70–99)
GLUCOSE URINE: NEGATIVE MG/DL
HCG QUALITATIVE: NEGATIVE
HCT VFR BLD CALC: 41.8 % (ref 36–48)
HEMOGLOBIN: 13.5 G/DL (ref 12–16)
HYALINE CASTS: 2 /LPF (ref 0–8)
KETONES, URINE: NEGATIVE MG/DL
LEUKOCYTE ESTERASE, URINE: ABNORMAL
LYMPHOCYTES ABSOLUTE: 2.7 K/UL (ref 1–5.1)
LYMPHOCYTES RELATIVE PERCENT: 48.4 %
MAGNESIUM: 2.1 MG/DL (ref 1.8–2.4)
MCH RBC QN AUTO: 31.8 PG (ref 26–34)
MCHC RBC AUTO-ENTMCNC: 32.3 G/DL (ref 31–36)
MCV RBC AUTO: 98.6 FL (ref 80–100)
MICROSCOPIC EXAMINATION: YES
MONOCYTES ABSOLUTE: 0.5 K/UL (ref 0–1.3)
MONOCYTES RELATIVE PERCENT: 9.1 %
NEUTROPHILS ABSOLUTE: 2.3 K/UL (ref 1.7–7.7)
NEUTROPHILS RELATIVE PERCENT: 41.4 %
NITRITE, URINE: NEGATIVE
PDW BLD-RTO: 15.1 % (ref 12.4–15.4)
PH UA: 7 (ref 5–8)
PLATELET # BLD: 218 K/UL (ref 135–450)
PMV BLD AUTO: 8.2 FL (ref 5–10.5)
POTASSIUM REFLEX MAGNESIUM: 3.5 MMOL/L (ref 3.5–5.1)
PRO-BNP: 24 PG/ML (ref 0–124)
PROTEIN UA: NEGATIVE MG/DL
RBC # BLD: 4.23 M/UL (ref 4–5.2)
RBC UA: 4 /HPF (ref 0–4)
SODIUM BLD-SCNC: 137 MMOL/L (ref 136–145)
SPECIFIC GRAVITY UA: 1.02 (ref 1–1.03)
TOTAL PROTEIN: 7.4 G/DL (ref 6.4–8.2)
TROPONIN: <0.01 NG/ML
URINE REFLEX TO CULTURE: YES
URINE TYPE: ABNORMAL
UROBILINOGEN, URINE: 0.2 E.U./DL
WBC # BLD: 5.6 K/UL (ref 4–11)
WBC UA: 16 /HPF (ref 0–5)

## 2021-09-28 PROCEDURE — 70496 CT ANGIOGRAPHY HEAD: CPT

## 2021-09-28 PROCEDURE — 84703 CHORIONIC GONADOTROPIN ASSAY: CPT

## 2021-09-28 PROCEDURE — 93005 ELECTROCARDIOGRAM TRACING: CPT | Performed by: STUDENT IN AN ORGANIZED HEALTH CARE EDUCATION/TRAINING PROGRAM

## 2021-09-28 PROCEDURE — 80048 BASIC METABOLIC PNL TOTAL CA: CPT

## 2021-09-28 PROCEDURE — 6360000004 HC RX CONTRAST MEDICATION: Performed by: STUDENT IN AN ORGANIZED HEALTH CARE EDUCATION/TRAINING PROGRAM

## 2021-09-28 PROCEDURE — 85025 COMPLETE CBC W/AUTO DIFF WBC: CPT

## 2021-09-28 PROCEDURE — 71046 X-RAY EXAM CHEST 2 VIEWS: CPT

## 2021-09-28 PROCEDURE — 36415 COLL VENOUS BLD VENIPUNCTURE: CPT

## 2021-09-28 PROCEDURE — 87086 URINE CULTURE/COLONY COUNT: CPT

## 2021-09-28 PROCEDURE — 99284 EMERGENCY DEPT VISIT MOD MDM: CPT

## 2021-09-28 PROCEDURE — 83880 ASSAY OF NATRIURETIC PEPTIDE: CPT

## 2021-09-28 PROCEDURE — 81001 URINALYSIS AUTO W/SCOPE: CPT

## 2021-09-28 PROCEDURE — 84484 ASSAY OF TROPONIN QUANT: CPT

## 2021-09-28 PROCEDURE — 83735 ASSAY OF MAGNESIUM: CPT

## 2021-09-28 PROCEDURE — 80076 HEPATIC FUNCTION PANEL: CPT

## 2021-09-28 PROCEDURE — 70450 CT HEAD/BRAIN W/O DYE: CPT

## 2021-09-28 RX ADMIN — IOPAMIDOL 75 ML: 755 INJECTION, SOLUTION INTRAVENOUS at 22:07

## 2021-09-28 ASSESSMENT — PAIN DESCRIPTION - DESCRIPTORS: DESCRIPTORS: ACHING

## 2021-09-28 ASSESSMENT — PAIN DESCRIPTION - FREQUENCY: FREQUENCY: CONTINUOUS

## 2021-09-28 ASSESSMENT — PAIN DESCRIPTION - ONSET: ONSET: ON-GOING

## 2021-09-28 ASSESSMENT — PAIN - FUNCTIONAL ASSESSMENT: PAIN_FUNCTIONAL_ASSESSMENT: PREVENTS OR INTERFERES SOME ACTIVE ACTIVITIES AND ADLS

## 2021-09-28 ASSESSMENT — PAIN SCALES - GENERAL: PAINLEVEL_OUTOF10: 2

## 2021-09-28 ASSESSMENT — PAIN DESCRIPTION - PROGRESSION: CLINICAL_PROGRESSION: NOT CHANGED

## 2021-09-28 ASSESSMENT — PAIN DESCRIPTION - ORIENTATION: ORIENTATION: LEFT

## 2021-09-28 ASSESSMENT — PAIN DESCRIPTION - LOCATION: LOCATION: ARM

## 2021-09-28 NOTE — TELEPHONE ENCOUNTER
Reason for Disposition   [1] Systolic BP  >= 684 OR Diastolic >= 80 AND [7] taking BP medications   [1] Age > 40 AND [2] no obvious cause AND [3] pain even when not moving the arm    (Exception: pain is clearly made worse by moving arm or bending neck)    Answer Assessment - Initial Assessment Questions  1. ONSET: \"When did the pain start? \"       Today    2. LOCATION: \"Where is the pain located? \"     Left bicep and left neck    3. PAIN: \"How bad is the pain? \" (Scale 1-10; or mild, moderate, severe)    - MILD (1-3): doesn't interfere with normal activities    - MODERATE (4-7): interferes with normal activities (e.g., work or school) or awakens from sleep    - SEVERE (8-10): excruciating pain, unable to do any normal activities, unable to hold a cup of water      2/10    4. WORK OR EXERCISE: \"Has there been any recent work or exercise that involved this part of the body? \"      Denies    5. CAUSE: \"What do you think is causing the arm pain? \"      Has been told it is a pinched nerve in the past but reports this pain is different    6. OTHER SYMPTOMS: \"Do you have any other symptoms? \" (e.g., neck pain, swelling, rash, fever, numbness, weakness)      Neck pain, intermittent numbness over the past few days    7. PREGNANCY: \"Is there any chance you are pregnant? \" \"When was your last menstrual period? \"      n/a    Answer Assessment - Initial Assessment Questions  1. BLOOD PRESSURE: \"What is the blood pressure? \" \"Did you take at least two measurements 5 minutes apart? \"    Most recent is 122/86 and 5 minutes before it was  148/102     2. ONSET: \"When did you take your blood pressure? \"      Just prior to call    3. HOW: \"How did you obtain the blood pressure? \" (e.g., visiting nurse, automatic home BP monitor)      Automatic home BP    4. HISTORY: \"Do you have a history of high blood pressure? \"       Yes    5. MEDICATIONS: Daphine Damon you taking any medications for blood pressure? \" \"Have you missed any doses recently? \" Benicar, denies missing any dosage    6. OTHER SYMPTOMS: \"Do you have any symptoms? \" (e.g., headache, chest pain, blurred vision, difficulty breathing, weakness)      Patient felt a palpitation prior to checking her BP - Pain in the left arm- See assessment. 7. PREGNANCY: \"Is there any chance you are pregnant? \" \"When was your last menstrual period? \"      n/a    Protocols used: HIGH BLOOD PRESSURE-ADULT-, ARM PAIN-ADULT-AH    Received call from [address] at [facility] with Red Flag Complaint. Brief description of triage: High BP and Arm pain    Triage indicates for patient to: ED Now for arm pain    Care advice provided, patient verbalizes understanding; denies any other questions or concerns; instructed to call back for any new or worsening symptoms. Attention Provider: Thank you for allowing me to participate in the care of your patient. The patient was connected to triage in response to information provided to the [facility]. Please do not respond through this encounter as the response is not directed to a shared pool.

## 2021-09-28 NOTE — Clinical Note
Patient Class: Observation [104]   REQUIRED: Diagnosis: Left arm numbness [605757]   Estimated Length of Stay: Estimated stay of less than 2 midnights

## 2021-09-29 ENCOUNTER — APPOINTMENT (OUTPATIENT)
Dept: MRI IMAGING | Age: 51
End: 2021-09-29
Payer: COMMERCIAL

## 2021-09-29 VITALS
TEMPERATURE: 98 F | HEART RATE: 85 BPM | DIASTOLIC BLOOD PRESSURE: 78 MMHG | SYSTOLIC BLOOD PRESSURE: 120 MMHG | OXYGEN SATURATION: 97 % | RESPIRATION RATE: 17 BRPM

## 2021-09-29 LAB
EKG ATRIAL RATE: 91 BPM
EKG DIAGNOSIS: NORMAL
EKG P AXIS: 58 DEGREES
EKG P-R INTERVAL: 160 MS
EKG Q-T INTERVAL: 382 MS
EKG QRS DURATION: 84 MS
EKG QTC CALCULATION (BAZETT): 469 MS
EKG R AXIS: 51 DEGREES
EKG T AXIS: 59 DEGREES
EKG VENTRICULAR RATE: 91 BPM
LV EF: 58 %
LVEF MODALITY: NORMAL
URINE CULTURE, ROUTINE: NORMAL

## 2021-09-29 PROCEDURE — 93306 TTE W/DOPPLER COMPLETE: CPT

## 2021-09-29 PROCEDURE — 93010 ELECTROCARDIOGRAM REPORT: CPT | Performed by: INTERNAL MEDICINE

## 2021-09-29 PROCEDURE — G0378 HOSPITAL OBSERVATION PER HR: HCPCS

## 2021-09-29 PROCEDURE — 6370000000 HC RX 637 (ALT 250 FOR IP): Performed by: NURSE PRACTITIONER

## 2021-09-29 PROCEDURE — 6360000002 HC RX W HCPCS: Performed by: NURSE PRACTITIONER

## 2021-09-29 PROCEDURE — 70551 MRI BRAIN STEM W/O DYE: CPT

## 2021-09-29 RX ORDER — SODIUM CHLORIDE 9 MG/ML
25 INJECTION, SOLUTION INTRAVENOUS PRN
Status: DISCONTINUED | OUTPATIENT
Start: 2021-09-29 | End: 2021-09-29 | Stop reason: HOSPADM

## 2021-09-29 RX ORDER — OLMESARTAN MEDOXOMIL AND HYDROCHLOROTHIAZIDE 40/25 40; 25 MG/1; MG/1
1 TABLET ORAL DAILY
Status: DISCONTINUED | OUTPATIENT
Start: 2021-09-29 | End: 2021-09-29 | Stop reason: CLARIF

## 2021-09-29 RX ORDER — LABETALOL HYDROCHLORIDE 5 MG/ML
10 INJECTION, SOLUTION INTRAVENOUS EVERY 10 MIN PRN
Status: DISCONTINUED | OUTPATIENT
Start: 2021-09-29 | End: 2021-09-29 | Stop reason: HOSPADM

## 2021-09-29 RX ORDER — FLUTICASONE PROPIONATE 50 MCG
2 SPRAY, SUSPENSION (ML) NASAL DAILY
Qty: 48 G | Refills: 1 | Status: SHIPPED | OUTPATIENT
Start: 2021-09-29

## 2021-09-29 RX ORDER — ONDANSETRON 4 MG/1
4 TABLET, ORALLY DISINTEGRATING ORAL EVERY 8 HOURS PRN
Status: DISCONTINUED | OUTPATIENT
Start: 2021-09-29 | End: 2021-09-29 | Stop reason: HOSPADM

## 2021-09-29 RX ORDER — SODIUM CHLORIDE 0.9 % (FLUSH) 0.9 %
5-40 SYRINGE (ML) INJECTION PRN
Status: DISCONTINUED | OUTPATIENT
Start: 2021-09-29 | End: 2021-09-29 | Stop reason: HOSPADM

## 2021-09-29 RX ORDER — ASPIRIN 300 MG/1
300 SUPPOSITORY RECTAL DAILY
Status: DISCONTINUED | OUTPATIENT
Start: 2021-09-29 | End: 2021-09-29 | Stop reason: HOSPADM

## 2021-09-29 RX ORDER — ACETAZOLAMIDE 250 MG/1
250 TABLET ORAL 2 TIMES DAILY
Status: DISCONTINUED | OUTPATIENT
Start: 2021-09-29 | End: 2021-09-29 | Stop reason: HOSPADM

## 2021-09-29 RX ORDER — PROMETHAZINE HYDROCHLORIDE AND PHENYLEPHRINE HYDROCHLORIDE 6.25; 5 MG/5ML; MG/5ML
5 SYRUP ORAL EVERY 6 HOURS
Qty: 240 ML | Refills: 0 | Status: SHIPPED | OUTPATIENT
Start: 2021-09-29 | End: 2022-07-25 | Stop reason: ALTCHOICE

## 2021-09-29 RX ORDER — CETIRIZINE HYDROCHLORIDE 10 MG/1
10 TABLET ORAL DAILY
Qty: 30 TABLET | Refills: 5 | Status: SHIPPED | OUTPATIENT
Start: 2021-09-29

## 2021-09-29 RX ORDER — ASPIRIN 81 MG/1
81 TABLET ORAL DAILY
Qty: 30 TABLET | Refills: 3 | Status: SHIPPED | OUTPATIENT
Start: 2021-09-30

## 2021-09-29 RX ORDER — SODIUM CHLORIDE 0.9 % (FLUSH) 0.9 %
5-40 SYRINGE (ML) INJECTION EVERY 12 HOURS SCHEDULED
Status: DISCONTINUED | OUTPATIENT
Start: 2021-09-29 | End: 2021-09-29 | Stop reason: HOSPADM

## 2021-09-29 RX ORDER — ROSUVASTATIN CALCIUM 20 MG/1
20 TABLET, COATED ORAL DAILY
Status: DISCONTINUED | OUTPATIENT
Start: 2021-09-29 | End: 2021-09-29 | Stop reason: HOSPADM

## 2021-09-29 RX ORDER — ASPIRIN 81 MG/1
81 TABLET ORAL DAILY
Status: DISCONTINUED | OUTPATIENT
Start: 2021-09-29 | End: 2021-09-29 | Stop reason: HOSPADM

## 2021-09-29 RX ORDER — POLYETHYLENE GLYCOL 3350 17 G/17G
17 POWDER, FOR SOLUTION ORAL DAILY PRN
Status: DISCONTINUED | OUTPATIENT
Start: 2021-09-29 | End: 2021-09-29 | Stop reason: HOSPADM

## 2021-09-29 RX ORDER — HYDROCHLOROTHIAZIDE 25 MG/1
25 TABLET ORAL DAILY
Status: DISCONTINUED | OUTPATIENT
Start: 2021-09-29 | End: 2021-09-29 | Stop reason: HOSPADM

## 2021-09-29 RX ORDER — ONDANSETRON 2 MG/ML
4 INJECTION INTRAMUSCULAR; INTRAVENOUS EVERY 6 HOURS PRN
Status: DISCONTINUED | OUTPATIENT
Start: 2021-09-29 | End: 2021-09-29 | Stop reason: HOSPADM

## 2021-09-29 RX ORDER — LOSARTAN POTASSIUM 25 MG/1
100 TABLET ORAL DAILY
Status: DISCONTINUED | OUTPATIENT
Start: 2021-09-29 | End: 2021-09-29 | Stop reason: HOSPADM

## 2021-09-29 RX ADMIN — HYDROCHLOROTHIAZIDE 25 MG: 25 TABLET ORAL at 10:02

## 2021-09-29 RX ADMIN — LOSARTAN POTASSIUM 100 MG: 25 TABLET, FILM COATED ORAL at 10:02

## 2021-09-29 RX ADMIN — ASPIRIN 81 MG: 81 TABLET ORAL at 10:03

## 2021-09-29 RX ADMIN — ROSUVASTATIN CALCIUM 20 MG: 20 TABLET, FILM COATED ORAL at 10:28

## 2021-09-29 RX ADMIN — ACETAZOLAMIDE 250 MG: 250 TABLET ORAL at 10:27

## 2021-09-29 NOTE — PROGRESS NOTES
Medication Reconciliation    List of medications for Angela Light is currently taking is complete.      Source of Information:   Epic records  Conversation with patient at bedside     Allergies  Allergy list not thoroughly reviewed with patient at this time  Allergies listed in Epic as follows: Lisinopril       Notes Regarding Home Medications:   Patient did not receive any of their home medications prior to arrival to the emergency department  Patient reports she did not take any of her medications yesterday (9/29) before coming to the ED  Patient was given her blood pressure medicine this morning   Patient reports she takes fish oil, black seed oil and vitamin D about twice a month for covid prevention    Luis Carlos Jo, Pharmacy intern  9/29/2021 10:17 AM

## 2021-09-29 NOTE — ED PROVIDER NOTES
Primary Care Physician: Nury Zacarias MD   Attending Physician: No att. providers found     History   Chief Complaint   Patient presents with    Shortness of Breath     shortness of breath. left arm pain. onset 2 days denies n/v.  denies diaphoresis. denies recent cough        HPI   Robert Watters is a 46 y.o. female of hypertension, hyperlipidemia, obstructive sleep apnea, pericarditis who presents this evening accompanied by mom complaining of of left arm numbness. Patient stated the symptoms started 2 days ago and is persisted. She is feeling some heaviness on her left upper extremity. She stated she also noticed that her blood pressures were higher than usual.  She also does have some pain around the left upper extremity but denies any weakness.      Past Medical History:   Diagnosis Date    Achilles rupture     repaired    Asthma     very mild, normal PFTs    Hyperlipidemia     Hypertension     Murmur, heart     Obstructive sleep apnea (adult) (pediatric)     Pericarditis     Prolonged emergence from general anesthesia     Pseudotumor cerebri 12/21/2011        Past Surgical History:   Procedure Laterality Date    ACHILLES TENDON SURGERY      COLONOSCOPY  2008    COLONOSCOPY N/A 12/18/2020    COLONOSCOPY POLYPECTOMY SNARE/COLD performed by Layne Altman MD at [address]  1/2009    OVARY REMOVAL  2019    TONSILLECTOMY  2005    TUBAL LIGATION          Family History   Problem Relation Age of Onset    Cancer Father     Diabetes Father     Heart Failure Father     Hypertension Father     Stroke Father     Migraines Father     Other Father         HANK    Kidney Disease Father     Coronary Art Dis Brother     Other Sister         HANK    Diabetes Sister     Other Mother         fibromyalgia    Osteoporosis Mother     Diabetes Mother     Breast Cancer Mother         over 48        Social History     Socioeconomic History    Marital status: Single Spouse name: None    Number of children: None    Years of education: None    Highest education level: None   Occupational History    None   Tobacco Use    Smoking status: Never Smoker    Smokeless tobacco: Never Used   Vaping Use    Vaping Use: Never used   Substance and Sexual Activity    Alcohol use: Yes     Alcohol/week: 1.0 - 2.0 standard drinks     Types: 1 Standard drinks or equivalent per week    Drug use: No    Sexual activity: Yes     Partners: Male   Other Topics Concern    None   Social History Narrative    None     Social Determinants of Health     Financial Resource Strain: Low Risk     Difficulty of Paying Living Expenses: Not hard at all   Food Insecurity: No Food Insecurity    Worried About Running Out of Food in the Last Year: Never true    Fady of Food in the Last Year: Never true   Transportation Needs: No Transportation Needs    Lack of Transportation (Medical): No    Lack of Transportation (Non-Medical): No   Physical Activity:     Days of Exercise per Week:     Minutes of Exercise per Session:    Stress:     Feeling of Stress :    Social Connections:     Frequency of Communication with Friends and Family:     Frequency of Social Gatherings with Friends and Family:     Attends Jainism Services:     Active Member of Clubs or Organizations:     Attends Club or Organization Meetings:     Marital Status:    Intimate Partner Violence:     Fear of Current or Ex-Partner:     Emotionally Abused:     Physically Abused:     Sexually Abused:         Review of Systems   10 total systems reviewed and found to be negative unless otherwise noted in HPI     Physical Exam   /78   Pulse 85   Temp 98 °F (36.7 °C) (Oral)   Resp 17   SpO2 97%      CONSTITUTIONAL: Well appearing, in no acute distress   HEAD: atraumatic, normocephalic   EYES: PERRL, No injection, discharge or scleral icterus. ENT: Moist mucous membranes.     NECK: Normal ROM, NO LAD   CARDIOVASCULAR: Regular rate and rhythm. No murmurs or gallop. PULMONARY/CHEST: Airway patent. No retractions. Breath sounds clear with good air entry bilaterally. ABDOMEN: Soft, Non-distended and non-tender, without guarding or rebound. SKIN: Acyanotic, warm, dry   MUSCULOSKELETAL: No swelling, tenderness or deformity   NEUROLOGICAL: Awake and oriented x 3. Pulses intact. Grossly nonfocal   Nursing note and vitals reviewed.      ED Course & Medical Decision Making   Medications   iopamidol (ISOVUE-370) 76 % injection 75 mL (75 mLs IntraVENous Given 9/28/21 2207)      Labs Reviewed   BASIC METABOLIC PANEL W/ REFLEX TO MG FOR LOW K - Abnormal; Notable for the following components:       Result Value    Glucose 152 (*)     CREATININE 1.2 (*)     GFR Non- 47 (*)     GFR  62 (*)     All other components within normal limits    Narrative:     Performed at:  [address] [place] [address]   Phone [phone]   URINE RT REFLEX TO CULTURE - Abnormal; Notable for the following components:    Clarity, UA CLOUDY (*)     Leukocyte Esterase, Urine SMALL (*)     All other components within normal limits    Narrative:     Performed at:  [address] [facility] [place] [address]   Phone [phone]   MICROSCOPIC URINALYSIS - Abnormal; Notable for the following components:    Bacteria, UA RARE (*)     WBC, UA 16 (*)     Epithelial Cells, UA 16 (*)     All other components within normal limits    Narrative:     Performed at:  [address] [facility] [place] [address]   Phone [phone]   CULTURE, URINE    Narrative:     ORDER#: [id number]                          ORDERED BY: Vanessa Dotson  SOURCE: Urine Clean Catch                  COLLECTED:  09/28/21 22:11  ANTIBIOTICS AT HOLLIE.:                      RECEIVED :  09/28/21 22:27  Performed at:  [id number]  [address]  [place], [place] [address]   Phone [phone]   CBC WITH AUTO DIFFERENTIAL    Narrative:     Performed at:  [facility]  [address]  [place], [place] [address]   Phone [phone]   BRAIN NATRIURETIC PEPTIDE    Narrative:     Performed at:  [facility]  [address] [facility] [place], [place] [address]   Phone [phone]   TROPONIN    Narrative:     Performed at:  [facility]  [address] [facility] [place], [place] [address]   Phone [phone]   HCG, SERUM, QUALITATIVE    Narrative:     Performed at:  [facility]  [address]  [place], [place] [address]   Phone [phone]   HEPATIC FUNCTION PANEL    Narrative:     Performed at:  [facility]  [address] [facility] [place], [place] [address]   Phone [phone]   MAGNESIUM    Narrative:     Performed at:  [facility]  [address]  [place], [place] [address]   Phone [phone]      MRI brain without contrast   Final Result   1. No acute intracranial abnormality. No acute infarct. 2. Enlarged empty sella. CTA HEAD NECK W CONTRAST   Final Result   1. No large vessel intracranial occlusion or high grade stenosis. 2. No significant stenoses of the internal carotid arteries. 3. Other findings as described. CT Head WO Contrast   Final Result   1. No acute hemorrhage or large intracranial mass-effect. Aspects 10.   2. Other findings as described. XR CHEST (2 VW)   Final Result   No acute cardiopulmonary process.             XR CHEST (2 VW)    Result Date: 9/28/2021  EXAMINATION: TWO XRAY VIEWS OF THE CHEST 9/28/2021 8:16 pm COMPARISON: 03/21/2021 HISTORY: ORDERING SYSTEM PROVIDED HISTORY: Shortness of breath TECHNOLOGIST PROVIDED HISTORY: Reason for exam:->Shortness of breath Reason for Exam: sob FINDINGS: Mild hypoinflation but overall the lungs are clear. No consolidation, pleural effusion, or pneumothorax. Cardiac silhouette is not enlarged and no pulmonary vascular congestion. Brooke are within normal limits. Bony thorax appears intact. No acute cardiopulmonary process. CT Head WO Contrast    Result Date: 9/28/2021  EXAMINATION: CT OF THE HEAD WITHOUT CONTRAST  9/28/2021 9:54 pm TECHNIQUE: CT of the head was performed without the administration of intravenous contrast. Dose modulation, iterative reconstruction, and/or weight based adjustment of the mA/kV was utilized to reduce the radiation dose to as low as reasonably achievable. COMPARISON: CT head 05/22/2021. HISTORY: ORDERING SYSTEM PROVIDED HISTORY: LUE numbness TECHNOLOGIST PROVIDED HISTORY: Reason for exam:->LUE numbness Has a \"code stroke\" or \"stroke alert\" been called? ->No Decision Support Exception - unselect if not a suspected or confirmed emergency medical condition->Emergency Medical Condition (MA) Is the patient pregnant?->No Reason for Exam: LUE numbness FINDINGS: BRAIN/VENTRICLES: No acute hemorrhage. Artifact partially obscures the amanda. Suzi Schneiders white differentiation appears maintained given artifact near the skull base and through the posterior fossa. Ventricles are within normal limits in size. There is no midline shift. Basal cisterns appear patent. Partially empty sella. ORBITS: Visualized orbits appear unremarkable on this unenhanced exam. SINUSES: Visualized paranasal sinuses appear clear. Visualized mastoid air cells appear clear. SOFT TISSUES/SKULL: No depressed calvarial fracture identified. 1. No acute hemorrhage or large intracranial mass-effect. Aspects 10. 2. Other findings as described. EKG INTERPRETATION:  EKG by my preliminary interpretation shows sinus rhythm with rate of 91, normal axis, normal intervals, with no ST changes indicative of ischemia at this time.     PROCEDURES: Procedures    ASSESSMENT AND PLAN:  Tamela Weldon is a 46 y.o. female of hypertension, hyperlipidemia, obstructive sleep apnea, pericarditis who presents this evening accompanied by mom complaining of of left arm numbness. Patient stated the symptoms started 2 days ago and is persisted. She is feeling some heaviness on her left upper extremity. She stated she also noticed that her blood pressures were higher than usual.  She also does have some pain around the left upper extremity but denies any weakness. Patient appears to be neurologically intact with 5/5 strength upper lower extremity. She is alert oriented no focal deficits. Despite normal neuro exam I did obtain a CT of the head and neck so far results are unremarkable. Stroke protocol was not activated given the fact that this is more than 2 days. Despite normal work-up on exam patient still complains of numbness left upper extremity with no neck pain. At this time I am less concerned that her symptoms might be a result of cervical impingement. I am concerned that her symptoms might be secondary to a central lesion. Given the persistence of her symptoms the past 3 days and given the fact that the symptoms have persisted despite negative work-up I am recommending that patient needs further evaluation with possible MRI. Hospitalist has been consulted pending admission. ClINICAL IMPRESSION:  1. Congestion of nasal sinus    2. Seasonal allergic rhinitis due to pollen        DISPOSITION Decision To Discharge 09/29/2021 05:17:19 PM   -Findings and recommendations explained to patient. She expressed understanding and agreed with the plan. Kristina Arguello MD (electronically signed)  10/2/2021  _________________________________________________________________________________________  _________________________________________________________________________________________  This record is transcribed utilizing voice recognition technology.  There are inherent limitations in this technology. In addition, there may be limitations in editing of this report. If there are any discrepancies, please contact me directly.         [address] Primo Jimenez MD  10/02/21 4556

## 2021-09-29 NOTE — H&P
Hospital Medicine History & Physical      PCP: Liliya Cobos MD    Date of Admission: 9/28/2021    Date of Service: Pt seen/examined on 9/28/2021 and Placed in Observation. Chief Complaint:  Left arm numbness      History Of Present Illness:      46 y.o. female with PMHx of HLD, HTN, Pseudotumor cerebri and Obesity presented to [facility] with intermittent episodes of left arm numbness and episode of feeling \"off\" Monday. Pt reports she was out with her sister walking through the park and lost her bearings. She felt \"off\", had difficulty finding words and had trouble with direction. This lasted only brief period that evening and when she returned home she noted her blood pressure was elevated. Today she continued to feel like something wasn't right and she was having numbness from the back of the left side of her head down her neck and left arm. She continued with elevated blood pressure. No extremity weakness, no speech or swallowing difficulty. No visual problems. Past Medical History:          Diagnosis Date    Achilles rupture     repaired    Asthma     very mild, normal PFTs    Hyperlipidemia     Hypertension     Murmur, heart     Obstructive sleep apnea (adult) (pediatric)     Pericarditis     Prolonged emergence from general anesthesia     Pseudotumor cerebri 12/21/2011       Past Surgical History:          Procedure Laterality Date    ACHILLES TENDON SURGERY      COLONOSCOPY  2008    COLONOSCOPY N/A 12/18/2020    COLONOSCOPY POLYPECTOMY SNARE/COLD performed by Roxana Collins MD at [address]  1/2009    OVARY REMOVAL  2019    TONSILLECTOMY  2005    TUBAL LIGATION         Medications Prior to Admission:      Prior to Admission medications    Medication Sig Start Date End Date Taking?  Authorizing Provider   rosuvastatin (CRESTOR) 20 MG tablet Take 1 tablet by mouth daily 8/11/21  Yes Liliya Cobos MD   acetaZOLAMIDE (DIAMOX) 250 MG tablet TAKE 1 TABLET TWICE A DAY 8/4/21  Yes Belén Martin MD   estradiol (VIVELLE) 0.05 MG/24HR Place 1 patch onto the skin 4/26/21  Yes Historical Provider, MD   olmesartan-hydroCHLOROthiazide (BENICAR HCT) 40-25 MG per tablet Take 1 tablet by mouth daily 3/5/21  Yes Abner Viramontes MD   MULTIPLE VITAMIN PO Take by mouth   Yes Historical Provider, MD   VITAMIN D PO Take by mouth   Yes Historical Provider, MD   UNABLE TO [address]   Yes Historical Provider, MD   Omega-3 Fatty Acids (FISH OIL PO) Take by mouth    Historical Provider, MD       Allergies:  Lisinopril    Social History:        TOBACCO:   reports that she has never smoked. She has never used smokeless tobacco.  ETOH:   reports current alcohol use of about 1.0 - 2.0 standard drinks of alcohol per week. Family History:      Reviewed in detail positive as follows:        Problem Relation Age of Onset    Cancer Father     Diabetes Father     Heart Failure Father     Hypertension Father     Stroke Father     Migraines Father     Other Father         HANK    Kidney Disease Father     Coronary Art Dis Brother     Other Sister         HANK    Diabetes Sister     Other Mother         fibromyalgia    Osteoporosis Mother     Diabetes Mother     Breast Cancer Mother         over 48       REVIEW OF SYSTEMS:   Pertinent positives as noted in the HPI. All other systems reviewed and negative. PHYSICAL EXAM PERFORMED:    BP (!) 146/107   Pulse 95   Temp 98 °F (36.7 °C) (Oral)   Resp 17   SpO2 99%     General appearance:  Well developed, well nourished, AA female lying on ED cart in no apparent distress, appears stated age and cooperative. HEENT:  Normal cephalic, atraumatic without obvious deformity. Pupils equal, round, and reactive to light. Conjunctivae/corneas clear. Neck: Supple, with full range of motion. No jugular venous distention. Trachea midline. Respiratory:  Normal respiratory effort.  Clear to auscultation, bilaterally without accessory muscle use. Cardiovascular:  Regular rate and rhythm without murmurs, no lower extremity edema. Abdomen: Soft, non-tender, non-distended, without rebound or guarding. Normal bowel sounds. Musculoskeletal:  Moves all extremities equally. Full range of motion without deformity. Skin: Skin warm, dry and intact. No rashes or lesions. Neurologic:  Neurovascularly intact without any focal sensory/motor deficits. Cranial nerves: II-XII intact, grossly non-focal.  Psychiatric:  Alert and oriented, thought content appropriate, normal insight  Capillary Refill: Brisk,< 3 seconds   Peripheral Pulses: +2 palpable, equal bilaterally       Labs:     Recent Labs     09/28/21 2024   WBC 5.6   HGB 13.5   HCT 41.8        Recent Labs     09/28/21 2024      K 3.5      CO2 22   BUN 18   CREATININE 1.2*   CALCIUM 9.7     Recent Labs     09/28/21 2024   AST 18   ALT 16   BILIDIR <0.2   BILITOT 0.3   ALKPHOS 74     No results for input(s): INR in the last 72 hours. Recent Labs     09/28/21 2024   TROPONINI <0.01       Urinalysis:      Lab Results   Component Value Date    NITRU Negative 09/28/2021    WBCUA 16 09/28/2021    BACTERIA RARE 09/28/2021    RBCUA 4 09/28/2021    BLOODU Negative 09/28/2021    SPECGRAV 1.017 09/28/2021    GLUCOSEU Negative 09/28/2021    GLUCOSEU NEGATIVE 12/22/2011       Radiology:       CTA HEAD NECK W CONTRAST   Final Result   1. No large vessel intracranial occlusion or high grade stenosis. 2. No significant stenoses of the internal carotid arteries. 3. Other findings as described. CT Head WO Contrast   Final Result   1. No acute hemorrhage or large intracranial mass-effect. Aspects 10.   2. Other findings as described. XR CHEST (2 VW)   Final Result   No acute cardiopulmonary process.              ASSESSMENT:    Active Hospital Problems    Diagnosis Date Noted    Left arm numbness [R20.0] 09/28/2021         PLAN:    Possible TIA/CVA  - with symptoms: Left arm numbness  - admit to [facility] to RO TIA/CVA  - head CT neg for acute pathology  - CTA head/neck: No large vessel intracranial occlusion or high grade stenosis. No significant stenosis of the ICA  - MRI and ECHO  - ASA, statin  - consult neurology   - check lipids, HBA1c  - allow permissive HTN, SBP < 220, DBP < 110    Essential (primary) hypertension   - monitor blood pressure  - continue Benicar     Hyperlipidemia   - continue crestor    Pseudotumor Cerebri  - continue Diamox    DVT Prophylaxis: Lovenox  Diet: Diet NPO Effective Now  Code Status: Prior    PT/OT Eval Status: pending    Dispo - Observation       [address], APRN - CNP    Thank you Demi Pulido MD for the opportunity to be involved in this patient's care. If you have any questions or concerns please feel free to contact me at [phone].

## 2021-09-29 NOTE — DISCHARGE SUMMARY
Hospital Medicine Discharge Summary    Patient: Edwina Woody     Gender: female  : 1970   Age: 46 y.o. MRN: [id number]    Admitting Physician: No admitting provider for patient encounter. Discharge Physician: Stacie Clark MD     Code Status: Full Code     Admit Date: 2021   Discharge Date:   21    Disposition:  Home    Discharge Diagnoses:  Possible TIA/CVA-no structural brain disease demonstrated by cerebral imaging. Essential (primary) hypertension      Hyperlipidemia      Pseudotumor Cerebri - continue Diamox     Empty Sella syndrome- has empty sella on MRI      Active Hospital Problems    Diagnosis Date Noted    Left arm numbness [R20.0] 2021       Follow-up appointments:  two weeks    Outpatient to do list: Keep a blood pressure diary, which should be presented to the PCP to help optimize her blood pressure control. Condition at Discharge:  Stable    Hospital Course:   46 y.o. female with PMHx of HLD, HTN, Pseudotumor cerebri and Obesity presented to [facility] intermittent episodes of left arm numbness and episode of feeling \"off\" Monday.  Pt reports she was out with her sister walking through the park and lost her bearings.  She felt \"off\", had difficulty finding words and had trouble with direction.  This lasted only brief period that evening and when she returned home she noted her blood pressure was elevated. She had numbness from the back of the left side of her head down her neck and left arm and an elevated blood pressure. No extremity weakness, no speech or swallowing difficulty.  No visual problems. She was admitted for TIA/stroke work-up-her neurovascular work-up turned out to be negative save for high blood pressure. Her troponins and EKGs were nondiagnostic for ACS s.he has been discharged and advised to keep a blood pressure diary that she should present her PCP. She also can have an outpatient echocardiographic study.     Discharge Medications:   Current Discharge Medication List      START taking these medications    Details   aspirin 81 MG EC tablet Take 1 tablet by mouth daily  Qty: 30 tablet, Refills: 3           Current Discharge Medication List      CONTINUE these medications which have CHANGED    Details   cetirizine (ZYRTEC) 10 MG tablet Take 1 tablet by mouth daily  Qty: 30 tablet, Refills: 5      Promethazine-Phenylephrine (PHENERGAN-VC) 6.25-5 MG/5ML syrup Take 5 mLs by mouth every 6 hours  Qty: 240 mL, Refills: 0    Associated Diagnoses: Congestion of nasal sinus      fluticasone (FLONASE) 50 MCG/ACT nasal spray 2 sprays by Each Nostril route daily  Qty: 48 g, Refills: 1    Associated Diagnoses: Seasonal allergic rhinitis due to pollen           Current Discharge Medication List      CONTINUE these medications which have NOT CHANGED    Details   rosuvastatin (CRESTOR) 20 MG tablet Take 1 tablet by mouth daily  Qty: 90 tablet, Refills: 2      acetaZOLAMIDE (DIAMOX) 250 MG tablet TAKE 1 TABLET TWICE A DAY  Qty: 180 tablet, Refills: 1    Associated Diagnoses: Pseudotumor cerebri      estradiol (VIVELLE) 0.05 MG/24HR Place 1 patch onto the skin      olmesartan-hydroCHLOROthiazide (BENICAR HCT) 40-25 MG per tablet Take 1 tablet by mouth daily  Qty: 90 tablet, Refills: 2    Associated Diagnoses: Essential hypertension      MULTIPLE VITAMIN PO Take by mouth      Omega-3 Fatty Acids (FISH OIL PO) Take by mouth      VITAMIN D PO Take by mouth      UNABLE TO FIND Black Seed Oil           Current Discharge Medication List          Discharge ROS:  A complete review of systems was asked and negative except for above    Discharge Exam:    /87   Pulse 85   Temp 98.3 °F (36.8 °C) (Oral)   Resp 16   SpO2 97%     General appearance: Morbidly obese . no apparent distress, appears stated age and cooperative. HEENT: Pupils equal, round, and reactive to light. Conjunctivae/corneas clear. Neck: Supple, with full range of motion.  No jugular venous distention. Trachea midline. Respiratory:  Normal respiratory effort. Clear to auscultation, bilaterally without Rales/Wheezes/Rhonchi. Cardiovascular: Regular rate and rhythm with normal S1/S2 without murmurs, rubs or gallops. Abdomen: Soft, non-tender, non-distended with normal bowel sounds. Musculoskeletal: No clubbing, cyanosis or edema bilaterally. Full range of motion without deformity. Skin: Skin color, texture, turgor normal.  No rashes or lesions. Neurologic:  Neurovascularly intact without any focal sensory/motor deficits. Cranial nerves: II-XII intact, grossly non-focal.  Psychiatric: Alert and oriented, thought content appropriate, normal insight  Capillary Refill: Brisk,3 seconds, normal   Peripheral Pulses: +2 palpable, equal bilaterally         Labs:  For convenience and continuity at follow-up the following most recent labs are provided:    Lab Results   Component Value Date    WBC 5.6 09/28/2021    HGB 13.5 09/28/2021    HCT 41.8 09/28/2021    MCV 98.6 09/28/2021     09/28/2021     09/28/2021    K 3.5 09/28/2021     09/28/2021    CO2 22 09/28/2021    BUN 18 09/28/2021    CREATININE 1.2 09/28/2021    CALCIUM 9.7 09/28/2021    PHOS 2.5 03/28/2016    ALKPHOS 74 09/28/2021    ALT 16 09/28/2021    AST 18 09/28/2021    BILITOT 0.3 09/28/2021    BILIDIR <0.2 09/28/2021    LABALBU 4.2 09/28/2021    LDLCALC 164 03/05/2021    TRIG 149 03/05/2021     Lab Results   Component Value Date    INR 0.97 03/21/2021    INR 1.14 12/21/2011    INR 1.19 (H) 12/19/2011       Radiology:  Echo Complete    Result Date: 9/29/2021  Transthoracic Echocardiography Report (TTE)  Demographics   Patient Name       [facility]   Date of Study      09/29/2021         Gender              Female   Patient Number     [id number]         Date of Birth       1970   Visit Number       [id number]          Age                 46 year(s)   Accession Number   [id number]         Room Number         0 Corporate ID       [id number]            Sonographer         Marian Cain,                                                            FERNANDOCS, RVT   Ordering Physician Juan José Silva, CNP           Physician           Dannielle Tan MD  Procedure Type of Study   TTE procedure:ECHOCARDIOGRAM COMPLETE WITH BUBBLE STUDY. Procedure Date Date: 09/29/2021 Start: 09:09 AM Study Location: [facility] Echo Lab Technical Quality: Adequate visualization Indications:TIA. Additional Indications:Left arm weakness/heaviness. H/o HTN, HLD, HANK, murmur, pericarditis. Patient Status: Routine Contrast Medium: Bubble Study. Height: 63 inches Weight: 250.01 pounds BSA: 2.13 m2 BMI: 44.29 kg/m2 Rhythm: Within normal limits HR: 73 bpm BP: 123/84 mmHg  Conclusions   Summary  Normal left ventricle size, wall thickness, and systolic function with an  estimated ejection fraction of 55-60%. No regional wall motion abnormalities  are seen. Normal diastolic function. The right ventricle is normal in size and function. Normal function of all valves. Signature   ------------------------------------------------------------------  Electronically signed by Dannielle Tan MD (Interpreting  physician) on 09/29/2021 at 12:34 PM  ------------------------------------------------------------------   Findings   Left Ventricle  Normal left ventricle size, wall thickness, and systolic function with an  estimated ejection fraction of 55-60%. No regional wall motion abnormalities  are seen. Normal diastolic function. Mitral Valve  The mitral valve appears structurally normal.  Trivial mitral regurgitation. No evidence of mitral stenosis. Left Atrium  The left atrium is normal in size. Aortic Valve  The aortic valve is structurally normal. There is no significant aortic  valve regurgitation or stenosis.    Aorta  The aortic root and ascending aorta are normal in size. Right Ventricle  The right ventricle is normal in size and function. Tricuspid Valve  The tricuspid valve is normal in structure and function. There is no  significant tricuspid valve regurgitation or stenosis. Right Atrium  The right atrial size is normal.   Pulmonic Valve  The pulmonic valve is not well visualized. The pulmonic valve is structurally normal.  Trivial pulmonic regurgitation present. No evidence of pulmonic valve stenosis. Pericardial Effusion  No pericardial effusion noted. Pleural Effusion  No pleural effusion. Miscellaneous  IVC not well visualized. Unable to estimate pulmonary artery pressure secondary to incomplete TR jet  envelope. A bubble study was performed and fails to show evidence of shunting.   M-Mode/2D Measurements (cm)   LV Diastolic Dimension: 4.3 cm  LV Systolic Dimension: 1.58 cm  LV Septum Diastolic: 5.55 cm  LV PW Diastolic: 2.93 cm        AO Root Dimension: 2.9 cm  RV Diastolic Dimension: 8.97 cm                                  LA Area: 20.1 cm2  LVOT: 1.9 cm                    LA volume/Index: 52 ml /24 ml/m2  Doppler Measurements   AV Peak Velocity: 133 cm/s     MV Peak E-Wave: 67.6 cm/s  AV Peak Gradient: 7.08 mmHg    MV Peak A-Wave: 66 cm/s  LVOT Peak Velocity: 124 cm/s   MV E/A Ratio: 1.02                                 MV Mean Gradient: 1 mmHg                                 MV Max P mmHg                                 MV Vmax:70 cm/s  E' Septal Velocity: 6.35 cm/s  MV VTI:17.9 cm/s  E' Lateral Velocity: 12.9 cm/s  PV Peak Velocity: 83.5 cm/s    MV Deceleration Time: 172 msec  PV Peak Gradient: 2.79 mmHg   Aortic Valve   Peak Velocity: 133 cm/s  Peak Gradient: 7.08 mmHg  Aorta   Aortic Root: 2.9 cm  Ascending Aorta: 2.9 cm  LVOT Diameter: 1.9 cm      XR CHEST (2 VW)    Result Date: 2021  EXAMINATION: TWO XRAY VIEWS OF THE CHEST 2021 8:16 pm COMPARISON: 2021 HISTORY: ORDERING SYSTEM PROVIDED HISTORY: Shortness of breath TECHNOLOGIST PROVIDED HISTORY: Reason for exam:->Shortness of breath Reason for Exam: sob FINDINGS: Mild hypoinflation but overall the lungs are clear. No consolidation, pleural effusion, or pneumothorax. Cardiac silhouette is not enlarged and no pulmonary vascular congestion. Brooke are within normal limits. Bony thorax appears intact. No acute cardiopulmonary process. CT Head WO Contrast    Result Date: 9/28/2021  EXAMINATION: CT OF THE HEAD WITHOUT CONTRAST  9/28/2021 9:54 pm TECHNIQUE: CT of the head was performed without the administration of intravenous contrast. Dose modulation, iterative reconstruction, and/or weight based adjustment of the mA/kV was utilized to reduce the radiation dose to as low as reasonably achievable. COMPARISON: CT head 05/22/2021. HISTORY: ORDERING SYSTEM PROVIDED HISTORY: LUE numbness TECHNOLOGIST PROVIDED HISTORY: Reason for exam:->LUE numbness Has a \"code stroke\" or \"stroke alert\" been called? ->No Decision Support Exception - unselect if not a suspected or confirmed emergency medical condition->Emergency Medical Condition (MA) Is the patient pregnant?->No Reason for Exam: LUE numbness FINDINGS: BRAIN/VENTRICLES: No acute hemorrhage. Artifact partially obscures the amanda. Wong Person white differentiation appears maintained given artifact near the skull base and through the posterior fossa. Ventricles are within normal limits in size. There is no midline shift. Basal cisterns appear patent. Partially empty sella. ORBITS: Visualized orbits appear unremarkable on this unenhanced exam. SINUSES: Visualized paranasal sinuses appear clear. Visualized mastoid air cells appear clear. SOFT TISSUES/SKULL: No depressed calvarial fracture identified. 1. No acute hemorrhage or large intracranial mass-effect. Aspects 10. 2. Other findings as described.      CTA HEAD NECK W CONTRAST    Result Date: 9/28/2021  EXAMINATION: CTA OF THE HEAD AND NECK WITH CONTRAST 9/28/2021 9:54 pm: TECHNIQUE: CTA of the head and neck was performed with the administration of intravenous contrast. Multiplanar reformatted images are provided for review. MIP images are provided for review. Stenosis of the internal carotid arteries measured using NASCET criteria. Dose modulation, iterative reconstruction, and/or weight based adjustment of the mA/kV was utilized to reduce the radiation dose to as low as reasonably achievable. COMPARISON: CT head same day. HISTORY: ORDERING SYSTEM PROVIDED HISTORY: LUE numbness TECHNOLOGIST PROVIDED HISTORY: Reason for exam:->LUE numbness Decision Support Exception - unselect if not a suspected or confirmed emergency medical condition->Emergency Medical Condition (MA) Reason for Exam: LUE numbness CTA NECK: AORTIC ARCH/ARCH VESSELS:  Origins of the innominate, brachiocephalic, and subclavian arteries appear patent. Right subclavian artery is partially obscured by artifact. CAROTID ARTERIES: Right common carotid artery: Patent without focal stenosis. Retropharyngeal course. Right internal carotid artery: No significant stenosis by NASCET criteria. Right external carotid artery origin: Patent without focal stenosis. Left common carotid artery: Patent without focal stenosis. Retropharyngeal course. Left internal carotid artery: No significant stenosis by NASCET criteria. Left external carotid artery origin: Patent without focal stenosis. VERTEBRAL ARTERIES: Right vertebral artery: Patent without focal stenosis. Left vertebral artery: Mild narrowing at the origin. Otherwise patent without focal stenosis. Dominant. SOFT TISSUES: Visualized lung apices are clear. No adenopathy in the neck and visualized chest. Soft tissues of the neck are unremarkable given patient positioning. BONES: Unremarkable on this nondedicated exam. CTA HEAD: ANTERIOR CIRCULATION: Intracranial internal carotid arteries: Patent without focal stenosis.  Anterior cerebral arteries: Patent without focal stenosis. Middle cerebral arteries: Patent without focal stenosis. POSTERIOR CIRCULATION: Intracranial vertebral arteries: Patent without focal stenosis. Basilar artery: Patent without focal stenosis. Posterior cerebral arteries: Patent without focal stenosis. OTHER: Cavernous sinuses are not well evaluated on this phase of contrast. No dural venous sinus thrombosis on this non-dedicated study. BRAIN: No midline shift. No extra-axial fluid collection. 1. No large vessel intracranial occlusion or high grade stenosis. 2. No significant stenoses of the internal carotid arteries. 3. Other findings as described. MRI brain without contrast    Result Date: 9/29/2021  EXAMINATION: MRI OF THE BRAIN WITHOUT CONTRAST  9/29/2021 8:15 am TECHNIQUE: Multiplanar multisequence MRI of the brain was performed without the administration of intravenous contrast. COMPARISON: 12/21/2011. HISTORY: ORDERING SYSTEM PROVIDED HISTORY: left arm numbness/heaviness TECHNOLOGIST PROVIDED HISTORY: Reason for exam:->left arm numbness/heaviness Decision Support Exception - unselect if not a suspected or confirmed emergency medical condition->Emergency Medical Condition (MA) Is the patient pregnant?->No Reason for Exam: presented to [employer] with intermittent episodes of left arm numbness and episode of feeling \"off\" Monday. Pt reports she was out with her sister walking through the park and lost her bearings. She felt \"off\", had difficulty finding words and had trouble with direction. This lasted only brief period that evening and when she returned home she noted her blood pressure was elevated. Today she continued to feel like something wasn't right and she was having numbness from the back of the left side of her head down her neck and left arm. She continued with elevated blood pressure. Acuity: Acute Type of Exam: Initial FINDINGS: INTRACRANIAL STRUCTURES/VENTRICLES: There is no acute infarct.  No mass effect or midline shift. No evidence of an acute intracranial hemorrhage. The ventricles and sulci are normal in size and configuration. An enlarged empty sella is noted. The normal signal voids within the major intracranial vessels appear maintained. ORBITS: The visualized portion of the orbits demonstrate no acute abnormality. SINUSES: The visualized paranasal sinuses and mastoid air cells are well aerated. BONES/SOFT TISSUES: The bone marrow signal intensity appears normal. The soft tissues demonstrate no acute abnormality. 1. No acute intracranial abnormality. No acute infarct. 2. Enlarged empty sella. EKG     Rhythm: normal sinus   Rate: normal  Clinical Impression: no acute changes        The patient was seen and examined on day of discharge and this discharge summary is in conjunction with any daily progress note from day of discharge. Time Spent on discharge is 30 minutes  in the examination, evaluation, counseling and review of medications and discharge plan. Note that more than 30 minutes was spent in preparing discharge papers, discussing discharge with patient, medication review, etc.       Signed:    Ca Alford MD   9/29/2021      Thank you Fadi Hawkins MD for the opportunity to be involved in this patient's care.  If you have any questions or concerns please feel free to contact me at [facility]

## 2021-09-29 NOTE — CONSULTS
Neurology Consult Note  Reason for Consult: TIA rule out, left sided numbness    Chief complaint: \"My blood pressure was high\"    Dr Grimaldo att. providers found asked me to see Christos Olivia in consultation for evaluation of TIA rule out, left sided numbness    History of Present Illness:  I obtained my information via the patient, supplemented with chart review. Christos Olivia is a 46 y.o. female with hx of HTN, HLD, HANK, pericarditis, pseudotumor cerebri who presented to the ED on 9/28/21 for evaluation of left arm numbness. Per my encounter the patient tells me that on Monday, 2 days prior to admission she was driving her typical daily route to the park with her sister when she got turned around briefly. She did not think much of it. She was walking the 2 mile trail which is not atypical for her. She felt generally ok, \"a little winded\". She drove home. When she got home she had climbed the stairs for which she felt nauseated and dizzy. She describes her dizziness as light headed, unsteady. She did not feel like the room was spinning. Denies any vision changes, coordination difficulty. Around that time she felt a 2/10 numbness sensation from the left bottom of her head, down her left neck, left scapula region and down her left arm. She reports that she had a pinched nerve in her neck in March of this year for which this felt different. She never has experienced this numbness before. She checked her BP for which she reports that it was high- 148/128 she reports that she typically has a SBP of 120's and a DBP of 75-84. She took 2 of her blood pressure medications- olmesarta/HCTZ combo pill. She kept checking her blood pressure for which her SBP improved but her DBP remained elevated around 97. Her heart rate was also elevated 110, she reports it typically is around 100. As her blood pressure improved so did her dizziness symptoms. She reports yesterday her BP was still elevated.  She states she tried taking vinegar and water to see if that would help but it did not. She was sitting at the table for dinner for which she felt a heart palpitation which is not entirely atypical of her. She checked her BP again for which it was 148/126. She had reached out to her PCP office for which given her left numbness symptoms she advised her to come to the ED for further evaluation. Since onset her numbness symptoms of the left have been intermittent, lasting only a few minutes. She denies any speech changes, chest pain, weakness, coordination difficulty. No neck pain. She has had some shortness of breath recently with exertion. She has felt more tired. No recent illness. Upon arrival to the ED her BP was 120/75, vitals stable. CT head was without acute findings-partially empty sella. CTA head & neck with no LVO or significant stenosis. MRI brain was completed prior to encounter and unrevealing for acute ischemic stroke. She reports her left numbness symptoms were present at time of MRI. She has an outpatient neurologist Dr. Chito Sylvester for which she follows with her pseudo tumor cerebri. She is on Diamox. Home medications include rosuvastatin 20mg. She tells me that she previously was on a baby aspirin for chest pain for which her PCP told her she did not take anymore. Today she feels well, still with intermittent numbness though none at time of encounter, she is eager to go home.        Medical History:  Past Medical History:   Diagnosis Date    Achilles rupture     repaired    Asthma     very mild, normal PFTs    Hyperlipidemia     Hypertension     Murmur, heart     Obstructive sleep apnea (adult) (pediatric)     Pericarditis     Prolonged emergence from general anesthesia     Pseudotumor cerebri 12/21/2011     Past Surgical History:   Procedure Laterality Date    ACHILLES TENDON SURGERY      COLONOSCOPY  2008    COLONOSCOPY N/A 12/18/2020    COLONOSCOPY POLYPECTOMY SNARE/COLD performed by Brook Cabrera Reno Cheatham MD at [address]  1/2009    OVARY REMOVAL  2019    TONSILLECTOMY  2005    TUBAL LIGATION       Scheduled Meds:   acetaZOLAMIDE  250 mg Oral BID    rosuvastatin  20 mg Oral Daily    sodium chloride flush  5-40 mL IntraVENous 2 times per day    enoxaparin  40 mg SubCUTAneous Daily    aspirin  81 mg Oral Daily    Or    aspirin  300 mg Rectal Daily    losartan  100 mg Oral Daily    And    hydroCHLOROthiazide  25 mg Oral Daily     Continuous Infusions:   sodium chloride       PRN Meds:.sodium chloride flush, sodium chloride, ondansetron **OR** ondansetron, polyethylene glycol, perflutren lipid microspheres, labetalol    Not in a hospital admission. Allergies   Allergen Reactions    Lisinopril        Family History   Problem Relation Age of Onset    Cancer Father     Diabetes Father     Heart Failure Father     Hypertension Father     Stroke Father     Migraines Father     Other Father         HANK    Kidney Disease Father     Coronary Art Dis Brother     Other Sister         HANK    Diabetes Sister     Other Mother         fibromyalgia    Osteoporosis Mother     Diabetes Mother     Breast Cancer Mother         over 48       Social History     Tobacco Use   Smoking Status Never Smoker   Smokeless Tobacco Never Used     Social History     Substance and Sexual Activity   Drug Use No     Social History     Substance and Sexual Activity   Alcohol Use Yes    Alcohol/week: 1.0 - 2.0 standard drinks    Types: 1 Standard drinks or equivalent per week       ROS:  Constitutional- No weight loss or fevers  Eyes- No diplopia. No photophobia. Ears/nose/throat- No dysphagia. No Dysarthria  Cardiovascular- + palpitations. No chest pain  Respiratory- + dyspnea. + Cough  Gastrointestinal- No Abdominal pain. No Vomiting. Genitourinary- No incontinence. No urinary retention  Musculoskeletal- No myalgia. No arthralgia  Skin- No rash. No easy bruising.   Psychiatric- No depression. No anxiety  Endocrine- No diabetes. No thyroid issues. Hematologic- No bleeding difficulty. + fatigue  Neurologic- No weakness. No Headache. Exam:  Vitals:    09/28/21 2330 09/29/21 0000 09/29/21 0202 09/29/21 0432   BP: (!) 146/107 (!) 152/92 113/82 123/84   Pulse: 95 89 107 85   Resp:    16   Temp:    98.3 °F (36.8 °C)   TempSrc:    Oral   SpO2: 99% 97%  95%      Constitutional    Vital signs: BP, HR, and RR reviewed   General Alert, no distress, well-nourished  Eyes: unable to visualize the fundi  Cardiovascular: pulses symmetric in all 4 extremities. No peripheral edema. Psychiatric: cooperative with examination, no  psychotic behavior noted. Neurologic  Mental status:   orientation to person, place, time and situation   General fund of knowledge:  grossly intact   Memory: Short term and long term intact   Attention intact as able to attend well to the exam     Language fluent in conversation   Comprehension intact; follows simple commands  Cranial nerves:   CN2: Visual Fields full w/o extinction on confrontational testing  CN 3,4,6: extraocular muscles intact, PERRLA @ 2mm. CN5: V1: V2: V3: intact to light touch sensation bilaterally  CN7: upper and lower facial symmetric without dysarthria  CN8: hearing grossly intact to conversation. CN9/10: palate elevated symmetrically  CN11: trap full strength on shoulder shrug bilaterally, SCM without weakness. CN12: tongue midline with protrusion. Able to move tongue side to side. Strength: Grasp: BUE 5/5 . RUE: 5/5 Shoulder abduction, elbow flexion, elbow extension. LUE: 5/5  Shoulder abduction, elbow flexion, elbow extension. Dorsiflexion: R 5/5, L 5/5 ;  Plantar flexion: R 5/5, L 5/5  RLE: 4+/5 Hip flexion, Knee flexion, Knee extension. LLE: 4+/5 Hip flexion, Knee flexion, Knee extension. Deep tendon reflexes:   R Bicep: 2+  R Brachioradialis: 2+  R Patellar: 2+   R Babinski: Toes down.  No hoffmans pincher reflex  L Bicep 2+ L Brachioradialis[de-identified] 2+  L Patellar: 2+    L Babinski: Toes down. No hoffmans pincher reflex. Sensory: light touch intact in all 4 extremities. No sensory extinction on double simultaneous stimulation. No numbness. Cerebellar/coordination: finger nose finger normal without ataxia  Tone: normal in all 4 extremities  Gait: deferred for safety. Labs  Na: 137  K: 3.5  BUN: 18  Creatinine: 1.2  Glucose: 152  Calcium: 9.7  M.10    ALT: 16  AST: 18    WBC: 5.6  RBC: 4.23  Hgb: 13.5  Hct: 41.8  Platelet: 043    Vitamin B12 21: 1,135  Folate 21: >20.00    Hcg: negative    UA: small leukocyte esterase. Negative for nitrites. Rare bacteria 16 WBC. Urine Culture: pending    Studies    MRI Brain w/o 21: Independently reviewed. 1. No acute intracranial abnormality. No acute infarct. 2. Enlarged empty sella. CT Head w/o 21: 1. No acute hemorrhage or large intracranial mass-effect. Aspects 10. 2. Other findings as described-partially empty sella    CTA Head & Neck w/ 21: Reviewed the read. 1. No large vessel intracranial occlusion or high grade stenosis. 2. No significant stenoses of the internal carotid arteries. 3. Other findings as described-Bilateral common carotids with retropharyngeal course. Impression  1. Intermittent mild left posterior neck, shoulder, arm numbness. 2. Dyspnea with exertion  3. Dizziness  4. HTN    Mark Ludwig is a 46 y.o. female with hx of HTN, HLD, HANK, pericarditis, pseudotumor cerebri who presents with persistent blood pressure elevation, dizziness, dyspnea with exertion, and intermittent mild left posterior neck, shoulder, arm numbness. Her dizziness symptoms likely related to her blood pressure elevation as she reports improvement with treatment of her blood pressure. Her intermittent left sided numbness she reports was present during MRI brain imaging and unrevealing for ischemic stroke. Do not suspect TIA.      Etiology: She reports cervical neck disease for which could be possible etiology. No red flags on exam, and her numbness symptoms she reports are mild. Recommendations  - No immediate further inpatient work up planned at this time. Will discuss with Neurology attending.   - Investigation into worsening dyspnea, palpitations to primary team.  - Treatment of UTI per primary team.   - Follow up with PCP for further blood pressure monitoring.    - If her numbness symptoms persist she can follow up with her outpatient neurologist for further evaluation.        Rehana Gonzáles, [address] Neurology    A copy of this note was provided for Dr Grimaldo att. providers found

## 2021-10-01 ENCOUNTER — OFFICE VISIT (OUTPATIENT)
Dept: INTERNAL MEDICINE CLINIC | Age: 51
End: 2021-10-01
Payer: COMMERCIAL

## 2021-10-01 VITALS
HEIGHT: 64 IN | BODY MASS INDEX: 43.87 KG/M2 | DIASTOLIC BLOOD PRESSURE: 72 MMHG | OXYGEN SATURATION: 98 % | TEMPERATURE: 97.8 F | WEIGHT: 257 LBS | SYSTOLIC BLOOD PRESSURE: 114 MMHG

## 2021-10-01 DIAGNOSIS — E66.01 CLASS 3 SEVERE OBESITY DUE TO EXCESS CALORIES WITH SERIOUS COMORBIDITY AND BODY MASS INDEX (BMI) OF 40.0 TO 44.9 IN ADULT (HCC): ICD-10-CM

## 2021-10-01 DIAGNOSIS — I10 ESSENTIAL HYPERTENSION: Primary | ICD-10-CM

## 2021-10-01 PROCEDURE — 99213 OFFICE O/P EST LOW 20 MIN: CPT | Performed by: INTERNAL MEDICINE

## 2021-10-01 RX ORDER — SEMAGLUTIDE 1.34 MG/ML
0.5 INJECTION, SOLUTION SUBCUTANEOUS WEEKLY
Qty: 2 PEN | Refills: 0 | COMMUNITY
Start: 2021-10-01 | End: 2022-03-31 | Stop reason: SDUPTHER

## 2021-10-01 NOTE — PROGRESS NOTES
Fernando Rosas (:  1970) is a 46 y.o. female,Established patient, here for evaluation of the following chief complaint(s):  ED Follow-up         ASSESSMENT/PLAN:  1. Essential hypertension  -     Comprehensive Metabolic Panel; Future  2. Class 3 severe obesity due to excess calories with serious comorbidity and body mass index (BMI) of 40.0 to 44.9 in adult ([facility]  -     Semaglutide,0.25 or 0.5MG/DOS, (OZEMPIC, 0.25 OR 0.5 MG/DOSE,) 2 MG/1.5ML SOPN; Inject 0.5 mg into the skin once a week, Disp-2 pen, R-0Sample      Return in about 6 weeks (around 2021) for hypertension. Subjective   SUBJECTIVE/OBJECTIVE:  HPI   Hypertension:  Home blood pressure monitoring: No.  She is adherent to a low sodium diet. Patient denies chest pain, shortness of breath and headache. Antihypertensive medication side effects: no medication side effects noted. Use of agents associated with hypertension: none. Sodium (mmol/L)   Date Value   2021 137    BUN (mg/dL)   Date Value   2021 18    Glucose (mg/dL)   Date Value   2021 152 (H)      Potassium reflex Magnesium (mmol/L)   Date Value   2021 3.5    CREATININE (mg/dL)   Date Value   2021 1.2 (H)           Review of Systems       Objective    Vitals:    10/01/21 1357 10/01/21 1429   BP: 112/76 114/72   Site: Left Upper Arm    Position: Sitting    Cuff Size: Large Adult    Temp: 97.8 °F (36.6 °C)    TempSrc: Infrared    SpO2: 98%    Weight: 257 lb (116.6 kg)    Height: 5' 4\" (1.626 m)       Wt Readings from Last 3 Encounters:   10/01/21 257 lb (116.6 kg)   21 259 lb (117.5 kg)   21 251 lb 9.6 oz (114.1 kg)     BP Readings from Last 3 Encounters:   10/01/21 114/72   21 120/78   21 (!) 143/87     Body mass index is 44.11 kg/m². Facility age limit for growth percentiles is 20 years. Physical Exam  Constitutional:       Appearance: Normal appearance.    HENT:      Head: Normocephalic and atraumatic. Right Ear: Tympanic membrane normal.      Left Ear: Tympanic membrane normal.      Nose: Nose normal. No congestion or rhinorrhea. Mouth/Throat:      Mouth: Mucous membranes are moist.      Pharynx: No oropharyngeal exudate or posterior oropharyngeal erythema. Cardiovascular:      Rate and Rhythm: Normal rate and regular rhythm. Pulses: Normal pulses. Heart sounds: No murmur heard. Musculoskeletal:      Cervical back: Normal range of motion and neck supple. Neurological:      Mental Status: She is alert. An electronic signature was used to authenticate this note.     --Nuha Cuevas MD

## 2021-12-15 ENCOUNTER — OFFICE VISIT (OUTPATIENT)
Dept: INTERNAL MEDICINE CLINIC | Age: 51
End: 2021-12-15
Payer: COMMERCIAL

## 2021-12-15 VITALS
HEIGHT: 64 IN | BODY MASS INDEX: 43.64 KG/M2 | HEART RATE: 72 BPM | TEMPERATURE: 97.8 F | OXYGEN SATURATION: 98 % | WEIGHT: 255.6 LBS | SYSTOLIC BLOOD PRESSURE: 118 MMHG | DIASTOLIC BLOOD PRESSURE: 80 MMHG

## 2021-12-15 DIAGNOSIS — I10 ESSENTIAL HYPERTENSION: ICD-10-CM

## 2021-12-15 DIAGNOSIS — I10 PRIMARY HYPERTENSION: Primary | ICD-10-CM

## 2021-12-15 DIAGNOSIS — E78.5 HYPERLIPIDEMIA, UNSPECIFIED HYPERLIPIDEMIA TYPE: ICD-10-CM

## 2021-12-15 DIAGNOSIS — Z11.59 NEED FOR HEPATITIS C SCREENING TEST: ICD-10-CM

## 2021-12-15 LAB
A/G RATIO: 1.1 (ref 1.1–2.2)
ALBUMIN SERPL-MCNC: 3.6 G/DL (ref 3.4–5)
ALP BLD-CCNC: 74 U/L (ref 40–129)
ALT SERPL-CCNC: 16 U/L (ref 10–40)
ANION GAP SERPL CALCULATED.3IONS-SCNC: 12 MMOL/L (ref 3–16)
AST SERPL-CCNC: 17 U/L (ref 15–37)
BILIRUB SERPL-MCNC: <0.2 MG/DL (ref 0–1)
BUN BLDV-MCNC: 16 MG/DL (ref 7–20)
CALCIUM SERPL-MCNC: 8.8 MG/DL (ref 8.3–10.6)
CHLORIDE BLD-SCNC: 105 MMOL/L (ref 99–110)
CO2: 20 MMOL/L (ref 21–32)
CREAT SERPL-MCNC: 1 MG/DL (ref 0.6–1.1)
GFR AFRICAN AMERICAN: >60
GFR NON-AFRICAN AMERICAN: 58
GLUCOSE BLD-MCNC: 102 MG/DL (ref 70–99)
POTASSIUM SERPL-SCNC: 4 MMOL/L (ref 3.5–5.1)
SODIUM BLD-SCNC: 137 MMOL/L (ref 136–145)
TOTAL PROTEIN: 6.9 G/DL (ref 6.4–8.2)

## 2021-12-15 PROCEDURE — 99214 OFFICE O/P EST MOD 30 MIN: CPT | Performed by: INTERNAL MEDICINE

## 2021-12-15 ASSESSMENT — ENCOUNTER SYMPTOMS
EYE REDNESS: 0
EYE PAIN: 0
COUGH: 0
CHOKING: 0

## 2021-12-15 NOTE — PROGRESS NOTES
Shanice Bryson (:  1970) is a 46 y.o. female,Established patient, here for evaluation of the following chief complaint(s):  Hypertension         ASSESSMENT/PLAN:  1. Primary hypertension  -     Comprehensive Metabolic Panel; Future  2. Hyperlipidemia, unspecified hyperlipidemia type  -     Lipid Panel; Future  3. Need for hepatitis C screening test  -     HEPATITIS C ANTIBODY; Future      Return in about 5 months (around 5/15/2022) for Annual Physical.         Subjective   SUBJECTIVE/OBJECTIVE:  HPI   Hypertension:  Home blood pressure monitoring: No.  She is adherent to a low sodium diet. Patient denies chest pain. Antihypertensive medication side effects: no medication side effects noted. Use of agents associated with hypertension: none. Sodium (mmol/L)   Date Value   2021 137    BUN (mg/dL)   Date Value   2021 18    Glucose (mg/dL)   Date Value   2021 152 (H)      Potassium reflex Magnesium (mmol/L)   Date Value   2021 3.5    CREATININE (mg/dL)   Date Value   2021 1.2 (H)           Review of Systems   Constitutional: Negative for diaphoresis and fatigue. HENT: Negative for ear discharge and ear pain. Eyes: Negative for pain and redness. Respiratory: Negative for cough and choking. Objective    Vitals:    12/15/21 0826   BP: 118/80   Site: Right Upper Arm   Position: Sitting   Cuff Size: Large Adult   Pulse: 72   Temp: 97.8 °F (36.6 °C)   TempSrc: Infrared   SpO2: 98%   Weight: 255 lb 9.6 oz (115.9 kg)   Height: 5' 4\" (1.626 m)      Wt Readings from Last 3 Encounters:   12/15/21 255 lb 9.6 oz (115.9 kg)   10/01/21 257 lb (116.6 kg)   21 259 lb (117.5 kg)     BP Readings from Last 3 Encounters:   12/15/21 118/80   10/01/21 114/72   21 120/78     Body mass index is 43.87 kg/m². Facility age limit for growth percentiles is 20 years.    Physical Exam  Constitutional:       General: She is not in acute distress. Appearance: Normal appearance. She is not ill-appearing. HENT:      Head: Normocephalic. Right Ear: Tympanic membrane and ear canal normal.      Left Ear: Tympanic membrane and ear canal normal.      Nose: Nose normal. No congestion. Mouth/Throat:      Mouth: Mucous membranes are moist.      Pharynx: No oropharyngeal exudate or posterior oropharyngeal erythema. Eyes:      General:         Right eye: No discharge. Left eye: No discharge. Pupils: Pupils are equal, round, and reactive to light. Cardiovascular:      Rate and Rhythm: Normal rate and regular rhythm. Heart sounds: No murmur heard. Pulmonary:      Effort: Pulmonary effort is normal. No respiratory distress. Breath sounds: No stridor. No wheezing or rhonchi. Musculoskeletal:      Cervical back: Normal range of motion. Neurological:      Mental Status: She is alert. An electronic signature was used to authenticate this note.     --Jalyn Ellington MD

## 2022-02-11 ENCOUNTER — OFFICE VISIT (OUTPATIENT)
Dept: NEUROLOGY | Age: 52
End: 2022-02-11
Payer: COMMERCIAL

## 2022-02-11 VITALS
HEART RATE: 81 BPM | HEIGHT: 64 IN | DIASTOLIC BLOOD PRESSURE: 92 MMHG | SYSTOLIC BLOOD PRESSURE: 144 MMHG | BODY MASS INDEX: 43.54 KG/M2 | WEIGHT: 255 LBS

## 2022-02-11 DIAGNOSIS — G93.2 PSEUDOTUMOR CEREBRI: ICD-10-CM

## 2022-02-11 PROCEDURE — 99213 OFFICE O/P EST LOW 20 MIN: CPT | Performed by: PSYCHIATRY & NEUROLOGY

## 2022-02-11 RX ORDER — ACETAZOLAMIDE 250 MG/1
TABLET ORAL
Qty: 180 TABLET | Refills: 1 | Status: SHIPPED | OUTPATIENT
Start: 2022-02-11 | End: 2022-08-18

## 2022-02-11 NOTE — PROGRESS NOTES
Veronica Ch   Neurology followup    Subjective:   CC/HP  History was obtained from the patient. Interval history:  Patient is doing well. She has not had any significant headaches. Patient was seen by her ophthalmologist  2 months ago and he feels her eyes are stable . The ophthalmologist found minimal papilledema which has been unchanged for a couple of years. No other neurological complaints  Details of her history:  Patient has known pseudotumor cerebri.   Patient was seen by her optometrist in July 2017 and was found to have very mild papilledema bilaterally  Lumbar puncture in 2011 head showed an opening pressure of 35 cm of CSF but the diagnosis was made        REVIEW OF SYSTEMS    Constitutional:  []   Chills   []  Fatigue   []  Fevers   []  Malaise   []  Weight loss     [x] Denies all of the above    Respiratory:   []  Cough    []  Shortness of breath         [x] Denies all of the above     Cardiovascular:   []  Chest pain    []  Exertional chest pressure/discomfort           [] Palpitations    []  Syncope     [x] Denies all of the above        Past Medical History:   Diagnosis Date    Achilles rupture     repaired    Asthma     very mild, normal PFTs    Hyperlipidemia     Hypertension     Murmur, heart     Obstructive sleep apnea (adult) (pediatric)     Pericarditis     Prolonged emergence from general anesthesia     Pseudotumor cerebri 12/21/2011     Family History   Problem Relation Age of Onset    Cancer Father     Diabetes Father     Heart Failure Father     Hypertension Father     Stroke Father     Migraines Father     Other Father         HANK    Kidney Disease Father     Coronary Art Dis Brother     Other Sister         HANK    Diabetes Sister     Other Mother         fibromyalgia    Osteoporosis Mother     Diabetes Mother     Breast Cancer Mother         over 48     Social History     Socioeconomic History    Marital status: Single     Spouse name: Not on file    Number of children: Not on file    Years of education: Not on file    Highest education level: Not on file   Occupational History    Not on file   Tobacco Use    Smoking status: Never Smoker    Smokeless tobacco: Never Used   Vaping Use    Vaping Use: Never used   Substance and Sexual Activity    Alcohol use: Yes     Alcohol/week: 1.0 - 2.0 standard drink     Types: 1 Standard drinks or equivalent per week    Drug use: No    Sexual activity: Yes     Partners: Male   Other Topics Concern    Not on file   Social History Narrative    Not on file     Social Determinants of Health     Financial Resource Strain:     Difficulty of Paying Living Expenses: Not on file   Food Insecurity:     Worried About Running Out of Food in the Last Year: Not on file    Fady of Food in the Last Year: Not on file   Transportation Needs:     Lack of Transportation (Medical): Not on file    Lack of Transportation (Non-Medical):  Not on file   Physical Activity:     Days of Exercise per Week: Not on file    Minutes of Exercise per Session: Not on file   Stress:     Feeling of Stress : Not on file   Social Connections:     Frequency of Communication with Friends and Family: Not on file    Frequency of Social Gatherings with Friends and Family: Not on file    Attends Sabianism Services: Not on file    Active Member of [address] [place] [address] [employer] or Organizations: Not on file    Attends Club or Organization Meetings: Not on file    Marital Status: Not on file   Intimate Partner Violence:     Fear of Current or Ex-Partner: Not on file    Emotionally Abused: Not on file    Physically Abused: Not on file    Sexually Abused: Not on file   Housing Stability:     Unable to Pay for Housing in the Last Year: Not on file    Number of [place] in the Last Year: Not on file    Unstable Housing in the Last Year: Not on file        Objective:  Exam:  BP (!) 144/92   Pulse 81   Ht 5' 4\" (1.626 m)   Wt 255 lb (115.7 kg)   BMI 43.77 kg/m²   This is a well-nourished patient in no acute distress  Patient is awake, alert and oriented x3. Speech is normal.  Pupils are equal round reacting to light. Extraocular movements intact. Face symmetrical. Tongue midline. Motor exam shows normal symmetrical strength. Deep tendon reflexes normal. Plantar reflexes downgoing. Sensory exam normal. Coordination normal. Gait normal. No carotid bruit. No neck stiffness. Impression :  Pseudotumor cerebri, doing well    Plan :  Continue acetazolamide 250 mg, one tablet b.i.d. Recommended that patient continue to lose weight  Return in 6 months        Please note a portion of  this chart was generated using dragon dictation software. Although every effort was made to ensure the accuracy of this automated transcription, some errors in transcription may have occurred.

## 2022-03-23 DIAGNOSIS — I10 ESSENTIAL HYPERTENSION: Chronic | ICD-10-CM

## 2022-03-23 RX ORDER — OLMESARTAN MEDOXOMIL AND HYDROCHLOROTHIAZIDE 40/25 40; 25 MG/1; MG/1
TABLET ORAL
Qty: 90 TABLET | Refills: 3 | Status: SHIPPED | OUTPATIENT
Start: 2022-03-23

## 2022-03-31 ENCOUNTER — OFFICE VISIT (OUTPATIENT)
Dept: INTERNAL MEDICINE CLINIC | Age: 52
End: 2022-03-31
Payer: COMMERCIAL

## 2022-03-31 VITALS
DIASTOLIC BLOOD PRESSURE: 78 MMHG | OXYGEN SATURATION: 98 % | WEIGHT: 256 LBS | HEART RATE: 79 BPM | HEIGHT: 64 IN | SYSTOLIC BLOOD PRESSURE: 126 MMHG | TEMPERATURE: 97.8 F | BODY MASS INDEX: 43.71 KG/M2

## 2022-03-31 DIAGNOSIS — E78.5 HYPERLIPIDEMIA, UNSPECIFIED HYPERLIPIDEMIA TYPE: ICD-10-CM

## 2022-03-31 DIAGNOSIS — Z00.00 WELL ADULT EXAM: Primary | ICD-10-CM

## 2022-03-31 DIAGNOSIS — I10 PRIMARY HYPERTENSION: ICD-10-CM

## 2022-03-31 DIAGNOSIS — E66.01 CLASS 3 SEVERE OBESITY DUE TO EXCESS CALORIES WITH SERIOUS COMORBIDITY AND BODY MASS INDEX (BMI) OF 40.0 TO 44.9 IN ADULT (HCC): ICD-10-CM

## 2022-03-31 PROCEDURE — 99396 PREV VISIT EST AGE 40-64: CPT | Performed by: INTERNAL MEDICINE

## 2022-03-31 RX ORDER — SEMAGLUTIDE 1.34 MG/ML
0.5 INJECTION, SOLUTION SUBCUTANEOUS WEEKLY
Qty: 2 PEN | Refills: 0 | COMMUNITY
Start: 2022-03-31 | End: 2022-08-26 | Stop reason: ALTCHOICE

## 2022-03-31 ASSESSMENT — PATIENT HEALTH QUESTIONNAIRE - PHQ9
1. LITTLE INTEREST OR PLEASURE IN DOING THINGS: 0
SUM OF ALL RESPONSES TO PHQ QUESTIONS 1-9: 0
SUM OF ALL RESPONSES TO PHQ QUESTIONS 1-9: 0
2. FEELING DOWN, DEPRESSED OR HOPELESS: 0
SUM OF ALL RESPONSES TO PHQ9 QUESTIONS 1 & 2: 0
SUM OF ALL RESPONSES TO PHQ QUESTIONS 1-9: 0
SUM OF ALL RESPONSES TO PHQ QUESTIONS 1-9: 0

## 2022-03-31 ASSESSMENT — ENCOUNTER SYMPTOMS
EYE REDNESS: 0
FACIAL SWELLING: 0
EYE PAIN: 0
CHOKING: 0

## 2022-03-31 NOTE — PROGRESS NOTES
3/31/2022    Heather Majano (:  1970) is a 46 y.o. female, here for a preventive medicine evaluation. Patient has some  Hypertension. She is doing well. She would like some more ozepmpic    Patient Active Problem List   Diagnosis    Hypertension    Hyperlipidemia    Morbid obesity ([address]    Pseudotumor cerebri    Benign intracranial hypertension    Palpitations    PVC (premature ventricular contraction)    Mild persistent asthma    Obstructive sleep apnea syndrome    Adenomatous polyp of cecum    Adenomatous polyp of ascending colon    Diverticulosis of colon without hemorrhage    Internal hemorrhoids without complication    Chest pain    Left arm numbness       Patient does wears seat belt when driving    Patient has been to the dentist within the past year. Review of Systems   Constitutional: Negative for fatigue and fever. HENT: Negative for ear discharge, ear pain and facial swelling. Eyes: Negative for pain and redness. Respiratory: Negative for choking. Cardiovascular: Negative for chest pain. Prior to Visit Medications    Medication Sig Taking?  Authorizing Provider   Semaglutide,0.25 or 0.5MG/DOS, (OZEMPIC, 0.25 OR 0.5 MG/DOSE,) 2 MG/1.5ML SOPN Inject 0.5 mg into the skin once a week Yes Corinne Chavez MD   olmesartan-hydroCHLOROthiazide (BENICAR HCT) 40-25 MG per tablet TAKE 1 TABLET DAILY  Corinne Chavez MD   acetaZOLAMIDE (DIAMOX) 250 MG tablet TAKE 1 TABLET TWICE A DAY  Luis Flores MD   cetirizine (ZYRTEC) 10 MG tablet Take 1 tablet by mouth daily  Crow Veloz MD   Promethazine-Phenylephrine [facility]) 6.25-5 MG/5ML syrup Take 5 mLs by mouth every 6 hours  Crow Veloz MD   fluticasone (FLONASE) 50 MCG/ACT nasal spray 2 sprays by Each Nostril route daily  Crow Veloz MD   aspirin 81 MG EC tablet Take 1 tablet by mouth daily  Crow Veloz MD   rosuvastatin (CRESTOR) 20 MG tablet Take 1 tablet by mouth daily  Sudhakar Cuevas MD   estradiol (VIVELLE) 0.05 MG/24HR Place 1 patch onto the skin  Historical Provider, MD   MULTIPLE VITAMIN PO Take by mouth  Historical Provider, MD   Omega-3 Fatty Acids (FISH OIL PO) Take by mouth  Historical Provider, MD   VITAMIN D PO Take by mouth  Historical Provider, MD   UNABLE TO [address]  Historical Provider, MD        Allergies   Allergen Reactions    Lisinopril        Past Medical History:   Diagnosis Date    Achilles rupture     repaired    Asthma     very mild, normal PFTs    Hyperlipidemia     Hypertension     Murmur, heart     Obstructive sleep apnea (adult) (pediatric)     Pericarditis     Prolonged emergence from general anesthesia     Pseudotumor cerebri 12/21/2011       Past Surgical History:   Procedure Laterality Date    ACHILLES TENDON SURGERY      COLONOSCOPY  2008    COLONOSCOPY N/A 12/18/2020    COLONOSCOPY POLYPECTOMY SNARE/COLD performed by Kun Holloway MD at [address]  1/2009    OVARY REMOVAL  2019    TONSILLECTOMY  2005    TUBAL LIGATION         Social History     Socioeconomic History    Marital status: Single     Spouse name: Not on file    Number of children: Not on file    Years of education: Not on file    Highest education level: Not on file   Occupational History    Not on file   Tobacco Use    Smoking status: Never Smoker    Smokeless tobacco: Never Used   Vaping Use    Vaping Use: Never used   Substance and Sexual Activity    Alcohol use:  Yes     Alcohol/week: 1.0 - 2.0 standard drink     Types: 1 Standard drinks or equivalent per week    Drug use: No    Sexual activity: Yes     Partners: Male   Other Topics Concern    Not on file   Social History Narrative    Not on file     Social Determinants of Health     Financial Resource Strain:     Difficulty of Paying Living Expenses: Not on file   Food Insecurity:     Worried About Running Out of Food in the Last Year: Not on file  Ran Out of Food in the Last Year: Not on file   Transportation Needs:     Lack of Transportation (Medical): Not on file    Lack of Transportation (Non-Medical):  Not on file   Physical Activity:     Days of Exercise per Week: Not on file    Minutes of Exercise per Session: Not on file   Stress:     Feeling of Stress : Not on file   Social Connections:     Frequency of Communication with Friends and Family: Not on file    Frequency of Social Gatherings with Friends and Family: Not on file    Attends Holiness Services: Not on file    Active Member of Clubs or Organizations: Not on file    Attends Club or Organization Meetings: Not on file    Marital Status: Not on file   Intimate Partner Violence:     Fear of Current or Ex-Partner: Not on file    Emotionally Abused: Not on file    Physically Abused: Not on file    Sexually Abused: Not on file   Housing Stability:     Unable to Pay for Housing in the Last Year: Not on file    Number of [place] in the Last Year: Not on file    Unstable Housing in the Last Year: Not on file        Family History   Problem Relation Age of Onset    Cancer Father     Diabetes Father     Heart Failure Father     Hypertension Father     Stroke Father     Migraines Father     Other Father         HANK    Kidney Disease Father     Coronary Art Dis Brother     Other Sister         HANK    Diabetes Sister     Other Mother         fibromyalgia    Osteoporosis Mother     Diabetes Mother     Breast Cancer Mother         over 48       ADVANCE DIRECTIVE: N, <no information>  Vitals:    03/31/22 1428   BP: 126/78   Site: Right Upper Arm   Position: Sitting   Cuff Size: Large Adult   Pulse: 79   Temp: 97.8 °F (36.6 °C)   TempSrc: Infrared   SpO2: 98%   Weight: 256 lb (116.1 kg)   Height: 5' 4\" (1.626 m)      Wt Readings from Last 3 Encounters:   03/31/22 256 lb (116.1 kg)   02/11/22 255 lb (115.7 kg)   12/15/21 255 lb 9.6 oz (115.9 kg)     BP Readings from Last 3 Encounters:   03/31/22 126/78   02/11/22 (!) 144/92   12/15/21 118/80     Body mass index is 43.94 kg/m². Facility age limit for growth percentiles is 20 years. Physical Exam  Constitutional:       Appearance: Normal appearance. She is obese. HENT:      Head: Normocephalic and atraumatic. Right Ear: Tympanic membrane normal.      Left Ear: Tympanic membrane normal.      Nose: Nose normal. No congestion or rhinorrhea. Mouth/Throat:      Mouth: Mucous membranes are moist.      Pharynx: No oropharyngeal exudate. Eyes:      General:         Right eye: No discharge. Left eye: No discharge. Pupils: Pupils are equal, round, and reactive to light. Cardiovascular:      Rate and Rhythm: Normal rate and regular rhythm. Pulmonary:      Effort: Pulmonary effort is normal. No respiratory distress. Breath sounds: No stridor. No wheezing or rhonchi. Abdominal:      General: Abdomen is flat. There is no distension. Palpations: There is no mass. Tenderness: There is no abdominal tenderness. Hernia: No hernia is present. Musculoskeletal:         General: No swelling or tenderness. Normal range of motion. Cervical back: Normal range of motion and neck supple. Neurological:      Mental Status: She is alert. No flowsheet data found.     Lab Results   Component Value Date    CHOL 232 03/05/2021    CHOL 275 06/29/2020    CHOL 190 06/03/2019    TRIG 149 03/05/2021    TRIG 146 06/29/2020    TRIG 134 06/03/2019    HDL 38 03/05/2021    HDL 37 06/29/2020    HDL 34 06/03/2019    HDL 35 12/20/2011    HDL 38 11/11/2011    HDL 35 02/14/2011    LDLCALC 164 03/05/2021    LDLCALC 209 06/29/2020    LDLCALC 129 06/03/2019    GLUCOSE 102 12/15/2021    LABA1C 5.2 06/03/2019    LABA1C 5.9 02/22/2019    LABA1C 5.6 12/20/2011       The 10-year ASCVD risk score (Neal Adhikari et al., 2013) is: 6.4%    Values used to calculate the score:      Age: 46 years      Sex: Female      Is Non- : Yes      Diabetic: No      Tobacco smoker: No      Systolic Blood Pressure: 198 mmHg      Is BP treated: Yes      HDL Cholesterol: 38 mg/dL      Total Cholesterol: 232 mg/dL    Immunization History   Administered Date(s) Administered    COVID-19, Moderna, Primary or Immunocompromised, PF, 100mcg/0.5mL 03/17/2021, 04/14/2021, 12/01/2021    Influenza, Quadv, IM, PF (6 mo and older Fluzone, Flulaval, Fluarix, and 3 yrs and older Afluria) 10/12/2020    Td, unspecified formulation 01/01/2005    Tdap (Boostrix, Adacel) 02/06/2015       Health Maintenance   Topic Date Due    Hepatitis C screen  Never done    Pneumococcal 0-64 years Vaccine (1 of 2 - PPSV23) Never done    Lipid screen  03/05/2022    Flu vaccine (1) 12/15/2022 (Originally 9/1/2021)    Shingles Vaccine (1 of 2) 03/31/2023 (Originally 6/28/2020)    Diabetes screen  06/03/2022    Breast cancer screen  06/24/2022    Depression Screen  10/01/2022    Potassium monitoring  12/15/2022    Creatinine monitoring  12/15/2022    DTaP/Tdap/Td vaccine (2 - Td or Tdap) 02/06/2025    Colorectal Cancer Screen  12/21/2025    COVID-19 Vaccine  Completed    HIV screen  Completed    Hepatitis A vaccine  Aged Out    Hepatitis B vaccine  Aged Out    Hib vaccine  Aged Out    Meningococcal (ACWY) vaccine  Aged Out       Assessment & Plan   Well adult exam  -  Increase exercise  -  Continue blood pressure medication    Class 3 severe obesity due to excess calories with serious comorbidity and body mass index (BMI) of 40.0 to 44.9 in adult ([facility] [address]  -     Semaglutide,0.25 or 0.5MG/DOS, (OZEMPIC, 0.25 OR 0.5 MG/DOSE,) 2 MG/1.5ML SOPN; Inject 0.5 mg into the skin once a week, Disp-2 pen, R-0Sample  Hyperlipidemia, unspecified hyperlipidemia type  Primary hypertension    Return in about 4 months (around 7/31/2022) for hypertension 30 min.          --Blanka Johnson MD

## 2022-05-18 ENCOUNTER — TELEPHONE (OUTPATIENT)
Dept: INTERNAL MEDICINE CLINIC | Age: 52
End: 2022-05-18

## 2022-05-18 NOTE — TELEPHONE ENCOUNTER
----- Message from [place] sent at 5/18/2022  1:29 PM EDT -----  Subject: Appointment Request    Reason for Call: Routine Physical Exam    QUESTIONS  Type of Appointment? Established Patient  Reason for appointment request? No appointments available during search  Additional Information for Provider? Pt is calling to cancel her appt this   afternoon. Pt pcp didn't have no available appts coming up at all. Pt is   asking can a physical be added to her F/U appt on Aug 1st at 2pm. Please   call pt back. ---------------------------------------------------------------------------  --------------  Citlali JOHNSON  What is the best way for the office to contact you? OK to leave message on   voicemail  Preferred Call Back Phone Number? [phone]  ---------------------------------------------------------------------------  --------------  SCRIPT ANSWERS  Relationship to Patient? Self  (If the patient has Medicare as their primary insurance coverage ask this   question) Are you requesting a Medicare Annual Wellness Visit? No  (Is the patient requesting a pap smear with their physical exam?)? No  (Is the patient requesting their annual physical and does not need PAP or   AWV per above?)? Yes   Have you been diagnosed with, awaiting test results for, or told that you   are suspected of having COVID-19 (Coronavirus)? (If patient has tested   negative or was tested as a requirement for work, school, or travel and   not based on symptoms, answer no)? No  Within the past 10 days have you developed any of the following symptoms   (answer no if symptoms have been present longer than 10 days or began   more than 10 days ago)? Fever or Chills, Cough, Shortness of breath or   difficulty breathing, Loss of taste or smell, Sore throat, Nasal   congestion, Sneezing or runny nose, Fatigue or generalized body aches   (answer no if pain is specific to a body part e.g. back pain), Diarrhea,   Headache?  No  Have you had close

## 2022-05-19 NOTE — TELEPHONE ENCOUNTER
Patient called and informed she does not need another physical this year and to just follow up like normal in August.

## 2022-07-19 NOTE — PATIENT INSTRUCTIONS
Pt called back and was given the message below. Start Valtrex  Take Tylenol #3 for severe  Take plain tylenol for mild pain

## 2022-07-21 ENCOUNTER — APPOINTMENT (OUTPATIENT)
Dept: GENERAL RADIOLOGY | Age: 52
End: 2022-07-21
Payer: COMMERCIAL

## 2022-07-21 ENCOUNTER — HOSPITAL ENCOUNTER (EMERGENCY)
Age: 52
Discharge: HOME | End: 2022-07-21
Payer: COMMERCIAL

## 2022-07-21 VITALS
HEIGHT: 63 IN | SYSTOLIC BLOOD PRESSURE: 161 MMHG | RESPIRATION RATE: 16 BRPM | TEMPERATURE: 98.7 F | OXYGEN SATURATION: 98 % | HEART RATE: 87 BPM | BODY MASS INDEX: 46.07 KG/M2 | WEIGHT: 260 LBS | DIASTOLIC BLOOD PRESSURE: 102 MMHG

## 2022-07-21 DIAGNOSIS — R03.0 ELEVATED BLOOD PRESSURE READING: Primary | ICD-10-CM

## 2022-07-21 LAB
A/G RATIO: 1.3 (ref 1.1–2.2)
ALBUMIN SERPL-MCNC: 4.3 G/DL (ref 3.4–5)
ALP BLD-CCNC: 87 U/L (ref 40–129)
ALT SERPL-CCNC: 17 U/L (ref 10–40)
ANION GAP SERPL CALCULATED.3IONS-SCNC: 12 MMOL/L (ref 3–16)
AST SERPL-CCNC: 20 U/L (ref 15–37)
BASOPHILS ABSOLUTE: 0 K/UL (ref 0–0.2)
BASOPHILS RELATIVE PERCENT: 0.5 %
BILIRUB SERPL-MCNC: 0.3 MG/DL (ref 0–1)
BUN BLDV-MCNC: 13 MG/DL (ref 7–20)
CALCIUM SERPL-MCNC: 9.8 MG/DL (ref 8.3–10.6)
CHLORIDE BLD-SCNC: 103 MMOL/L (ref 99–110)
CO2: 23 MMOL/L (ref 21–32)
CREAT SERPL-MCNC: 0.9 MG/DL (ref 0.6–1.1)
EOSINOPHILS ABSOLUTE: 0 K/UL (ref 0–0.6)
EOSINOPHILS RELATIVE PERCENT: 0.7 %
GFR AFRICAN AMERICAN: >60
GFR NON-AFRICAN AMERICAN: >60
GLUCOSE BLD-MCNC: 81 MG/DL (ref 70–99)
HCT VFR BLD CALC: 41.6 % (ref 36–48)
HEMOGLOBIN: 13.5 G/DL (ref 12–16)
LYMPHOCYTES ABSOLUTE: 3.5 K/UL (ref 1–5.1)
LYMPHOCYTES RELATIVE PERCENT: 51 %
MCH RBC QN AUTO: 32.4 PG (ref 26–34)
MCHC RBC AUTO-ENTMCNC: 32.6 G/DL (ref 31–36)
MCV RBC AUTO: 99.4 FL (ref 80–100)
MONOCYTES ABSOLUTE: 0.6 K/UL (ref 0–1.3)
MONOCYTES RELATIVE PERCENT: 9.3 %
NEUTROPHILS ABSOLUTE: 2.7 K/UL (ref 1.7–7.7)
NEUTROPHILS RELATIVE PERCENT: 38.5 %
PDW BLD-RTO: 15.6 % (ref 12.4–15.4)
PLATELET # BLD: 280 K/UL (ref 135–450)
PMV BLD AUTO: 7.7 FL (ref 5–10.5)
POTASSIUM SERPL-SCNC: 3.6 MMOL/L (ref 3.5–5.1)
PRO-BNP: 178 PG/ML (ref 0–124)
RBC # BLD: 4.18 M/UL (ref 4–5.2)
SARS-COV-2, NAAT: NOT DETECTED
SODIUM BLD-SCNC: 138 MMOL/L (ref 136–145)
TOTAL PROTEIN: 7.6 G/DL (ref 6.4–8.2)
TROPONIN: <0.01 NG/ML
WBC # BLD: 6.9 K/UL (ref 4–11)

## 2022-07-21 PROCEDURE — 87635 SARS-COV-2 COVID-19 AMP PRB: CPT

## 2022-07-21 PROCEDURE — 99285 EMERGENCY DEPT VISIT HI MDM: CPT

## 2022-07-21 PROCEDURE — 84484 ASSAY OF TROPONIN QUANT: CPT

## 2022-07-21 PROCEDURE — 80053 COMPREHEN METABOLIC PANEL: CPT

## 2022-07-21 PROCEDURE — 71045 X-RAY EXAM CHEST 1 VIEW: CPT

## 2022-07-21 PROCEDURE — 85025 COMPLETE CBC W/AUTO DIFF WBC: CPT

## 2022-07-21 PROCEDURE — 83880 ASSAY OF NATRIURETIC PEPTIDE: CPT

## 2022-07-21 PROCEDURE — 93005 ELECTROCARDIOGRAM TRACING: CPT

## 2022-07-21 PROCEDURE — 36415 COLL VENOUS BLD VENIPUNCTURE: CPT

## 2022-07-21 ASSESSMENT — VISUAL ACUITY: OU: 1

## 2022-07-21 ASSESSMENT — PAIN - FUNCTIONAL ASSESSMENT: PAIN_FUNCTIONAL_ASSESSMENT: NONE - DENIES PAIN

## 2022-07-21 ASSESSMENT — ENCOUNTER SYMPTOMS
COUGH: 1
WHEEZING: 0
ABDOMINAL PAIN: 0
NAUSEA: 0
VOMITING: 0
SHORTNESS OF BREATH: 1
DIARRHEA: 0
RHINORRHEA: 0

## 2022-07-21 NOTE — ED PROVIDER NOTES
[facility]        Pt Name: Alina Barlow  MRN: [id number]  [place] 1970  Date of evaluation: 7/21/2022  Provider: Jayro Dobson PA-C  PCP: Tamera Andrews MD  Note Started: 4:28 PM EDT       MARCOS. I have evaluated this patient. My supervising physician was available for consultation. CHIEF COMPLAINT       Chief Complaint   Patient presents with    Hypertension     States BP was 190/120 at home, and was experiencing blurry vision with this. Shortness of Breath     Since yesterday, denies asthma and copd. Also has a dry cough. Had cp yesterday but denies it at this time. Sating 99% on RA. HISTORY OF PRESENT ILLNESS   (Location, Timing/Onset, Context/Setting, Quality, Duration, Modifying Factors, Severity, Associated Signs and Symptoms)  Note limiting factors. Chief Complaint: HTN     Alina Barlow is a 46 y.o. female who presents for evaluation of elevated blood pressure reading. Patient states that she just felt \"blah\" last night. States that she woke up with blurry vision which she states that she has had when her blood pressure is high. States that she took her blood pressure and it was high, 190s over 120s. Reports medication compliance with Benicar. States that she has been drinking vinegar and water and this is bringing it down. She also reports that she has had a dry nonproductive cough for the past week with mild shortness of breath. No chest pain. No fevers or chills. No headaches, dizziness/lightheadedness, weakness, visual disturbances, facial asymmetry, speech difficulty or syncope. She has no other complaints or concerns at this time. Nursing Notes were all reviewed and agreed with or any disagreements were addressed in the HPI.     REVIEW OF SYSTEMS    (2-9 systems for level 4, 10 or more for level 5)     Review of Systems   Constitutional:  Negative for appetite change, chills and fever.   HENT:  Negative for congestion and rhinorrhea. Eyes:  Positive for visual disturbance. Respiratory:  Positive for cough and shortness of breath. Negative for wheezing. Cardiovascular:  Negative for chest pain. Gastrointestinal:  Negative for abdominal pain, diarrhea, nausea and vomiting. Genitourinary:  Negative for difficulty urinating, dysuria and hematuria. Musculoskeletal:  Negative for neck pain and neck stiffness. Skin:  Negative for rash. Neurological:  Negative for dizziness, syncope, weakness, light-headedness and headaches. Positives and Pertinent negatives as per HPI. Except as noted above in the ROS, all other systems were reviewed and negative.        PAST MEDICAL HISTORY     Past Medical History:   Diagnosis Date    Achilles rupture     repaired    Asthma     very mild, normal PFTs    Hyperlipidemia     Hypertension     Murmur, heart     Obstructive sleep apnea (adult) (pediatric)     Pericarditis     Prolonged emergence from general anesthesia     Pseudotumor cerebri 12/21/2011         SURGICAL HISTORY     Past Surgical History:   Procedure Laterality Date    ACHILLES TENDON SURGERY      COLONOSCOPY  2008    COLONOSCOPY N/A 12/18/2020    COLONOSCOPY POLYPECTOMY SNARE/COLD performed by Katrina Olguin MD at [facility] ([facility])  1/2009    OVARY REMOVAL  2019    TONSILLECTOMY  2005    TUBAL LIGATION           CURRENTMEDICATIONS       Previous Medications    ACETAZOLAMIDE (DIAMOX) 250 MG TABLET    TAKE 1 TABLET TWICE A DAY    ASPIRIN 81 MG EC TABLET    Take 1 tablet by mouth daily    CETIRIZINE (ZYRTEC) 10 MG TABLET    Take 1 tablet by mouth daily    ESTRADIOL (VIVELLE) 0.05 MG/24HR    Place 1 patch onto the skin    FLUTICASONE (FLONASE) 50 MCG/ACT NASAL SPRAY    2 sprays by Each Nostril route daily    MULTIPLE VITAMIN PO    Take by mouth    OLMESARTAN-HYDROCHLOROTHIAZIDE (BENICAR HCT) 40-25 MG PER TABLET    TAKE 1 TABLET DAILY OMEGA-3 FATTY ACIDS (FISH OIL PO)    Take by mouth    PROMETHAZINE-PHENYLEPHRINE (PHENERGAN-VC) 6.25-5 MG/5ML SYRUP    Take 5 mLs by mouth every 6 hours    ROSUVASTATIN (CRESTOR) 20 MG TABLET    Take 1 tablet by mouth daily    SEMAGLUTIDE,0.25 OR 0.5MG/DOS, (OZEMPIC, 0.25 OR 0.5 MG/DOSE,) 2 MG/1.5ML SOPN    Inject 0.5 mg into the skin once a week    UNABLE TO FIND    Black Seed Oil    VITAMIN D PO    Take by mouth         ALLERGIES     Lisinopril    FAMILYHISTORY       Family History   Problem Relation Age of Onset    Cancer Father     Diabetes Father     Heart Failure Father     Hypertension Father     Stroke Father     Migraines Father     Other Father         HANK    Kidney Disease Father     Coronary Art Dis Brother     Other Sister         HANK    Diabetes Sister     Other Mother         fibromyalgia    Osteoporosis Mother     Diabetes Mother     Breast Cancer Mother         over 48          SOCIAL HISTORY       Social History     Tobacco Use    Smoking status: Never    Smokeless tobacco: Never   Vaping Use    Vaping Use: Never used   Substance Use Topics    Alcohol use: Yes     Alcohol/week: 1.0 - 2.0 standard drink     Types: 1 Standard drinks or equivalent per week     Comment: occassionally    Drug use: No       SCREENINGS    Francisco Coma Scale  Eye Opening: Spontaneous  Best Verbal Response: Oriented  Best Motor Response: Obeys commands  [place] Coma Scale Score: 15        PHYSICAL EXAM    (up to 7 for level 4, 8 or more for level 5)     ED Triage Vitals [07/21/22 1614]   BP Temp Temp Source Heart Rate Resp SpO2 Height Weight   (!) 161/102 98.7 °F (37.1 °C) Oral 87 16 98 % 5' 3\" (1.6 m) 260 lb (117.9 kg)       Physical Exam  Constitutional:       Appearance: She is well-developed. She is not diaphoretic. HENT:      Head: Normocephalic and atraumatic. Right Ear: External ear normal.      Left Ear: External ear normal.      Nose: Nose normal.   Eyes:      General: Vision grossly intact.          Right eye: No discharge. Left eye: No discharge. Extraocular Movements: Extraocular movements intact. Conjunctiva/sclera: Conjunctivae normal.      Pupils: Pupils are equal, round, and reactive to light. Cardiovascular:      Rate and Rhythm: Normal rate and regular rhythm. Heart sounds: Normal heart sounds. Pulmonary:      Effort: Pulmonary effort is normal. No respiratory distress. Breath sounds: Normal breath sounds. Chest:      Chest wall: No tenderness. Abdominal:      General: There is no distension. Palpations: Abdomen is soft. Tenderness: no abdominal tenderness   Musculoskeletal:         General: Normal range of motion. Cervical back: Normal range of motion and neck supple. Skin:     General: Skin is warm and dry. Neurological:      Mental Status: She is alert and oriented to person, place, and time. Mental status is at baseline. GCS: GCS eye subscore is 4. GCS verbal subscore is 5. GCS motor subscore is 6. Cranial Nerves: Cranial nerves are intact. Psychiatric:         Behavior: Behavior normal.       DIAGNOSTIC RESULTS   LABS:    Labs Reviewed   CBC WITH AUTO DIFFERENTIAL - Abnormal; Notable for the following components:       Result Value    RDW 15.6 (*)     All other components within normal limits   BRAIN NATRIURETIC PEPTIDE - Abnormal; Notable for the following components:    Pro- (*)     All other components within normal limits   COVID-19, RAPID   COMPREHENSIVE METABOLIC PANEL   TROPONIN       When ordered only abnormal lab results are displayed. All other labs were within normal range or not returned as of this dictation. EKG: When ordered, EKG's are interpreted by the Emergency Department Physician in the absence of a cardiologist.  Please see their note for interpretation of EKG.     RADIOLOGY:   Non-plain film images such as CT, Ultrasound and MRI are read by the radiologist. Plain radiographic images are visualized and preliminarily interpreted by the ED Provider with the below findings:        Interpretation per the Radiologist below, if available at the time of this note:    XR CHEST PORTABLE   Final Result   No acute process. No results found. PROCEDURES   Unless otherwise noted below, none     Procedures    CRITICAL CARE TIME       CONSULTS:  None      EMERGENCY DEPARTMENT COURSE and DIFFERENTIAL DIAGNOSIS/MDM:   Vitals:    Vitals:    07/21/22 1614   BP: (!) 161/102   Pulse: 87   Resp: 16   Temp: 98.7 °F (37.1 °C)   TempSrc: Oral   SpO2: 98%   Weight: 260 lb (117.9 kg)   Height: 5' 3\" (1.6 m)       Patient was given the following medications:  Medications - No data to display      Is this patient to be included in the SEP-1 Core Measure due to severe sepsis or septic shock? No   Exclusion criteria - the patient is NOT to be included for SEP-1 Core Measure due to: Infection is not suspected    Patient presents for evaluation of elevated blood pressure reading. On exam, he is resting comfortably in bed no acute distress and nontoxic. She is hypertensive but vitals otherwise stable and she is afebrile. Alert and oriented x3. GCS 15. Cranial nerves II through XII are intact. Lungs are clear to auscultation bilaterally, chest is nontender and abdomen is benign. Please see attending note for EKG interpretation. CBC and CMP are unremarkable. Troponin is negative. . Chest x-ray shows no acute process. Rapid COVID is negative. Patient encouraged to follow-up with PCP for blood pressure control and recheck. Patient's oxygen saturations are good. I do not believe the patient's symptoms today are due to pulmonary embolism, pulmonary edema, pneumonia, pneumothorax, ACS, CHF, status asthmaticus, acute respiratory failure, profound anemia or metabolic abnormality, amongst other more emergent diagnostic considerations.   Patient was advised to immediately return to emergency department if symptoms worsen. She is agreeable to this plan and stable for discharge at this time. FINAL IMPRESSION      1.  Elevated blood pressure reading          DISPOSITION/PLAN   DISPOSITION Decision To Discharge 07/21/2022 05:49:12 PM      PATIENT REFERRED TO:  Jinny Horta, [address] [address]   [phone]    Call   For a re-check in  1-2   days    [facility] Emergency [facility]  [facility]  [phone]  Go to   If symptoms worsen    DISCHARGE MEDICATIONS:  New Prescriptions    No medications on file       DISCONTINUED MEDICATIONS:  Discontinued Medications    No medications on file              (Please note that portions of this note were completed with a voice recognition program.  Efforts were made to edit the dictations but occasionally words are mis-transcribed.)    Erickson Meyer PA-C (electronically signed)           Clinton Farley PA-C  07/21/22 4821

## 2022-07-22 LAB
EKG ATRIAL RATE: 82 BPM
EKG DIAGNOSIS: NORMAL
EKG P AXIS: 58 DEGREES
EKG P-R INTERVAL: 180 MS
EKG Q-T INTERVAL: 392 MS
EKG QRS DURATION: 74 MS
EKG QTC CALCULATION (BAZETT): 457 MS
EKG R AXIS: 49 DEGREES
EKG T AXIS: 44 DEGREES
EKG VENTRICULAR RATE: 82 BPM

## 2022-07-22 PROCEDURE — 93010 ELECTROCARDIOGRAM REPORT: CPT | Performed by: INTERNAL MEDICINE

## 2022-07-25 ENCOUNTER — OFFICE VISIT (OUTPATIENT)
Dept: INTERNAL MEDICINE CLINIC | Age: 52
End: 2022-07-25
Payer: COMMERCIAL

## 2022-07-25 VITALS
WEIGHT: 255 LBS | HEART RATE: 86 BPM | HEIGHT: 63 IN | BODY MASS INDEX: 45.18 KG/M2 | DIASTOLIC BLOOD PRESSURE: 90 MMHG | OXYGEN SATURATION: 94 % | SYSTOLIC BLOOD PRESSURE: 138 MMHG

## 2022-07-25 DIAGNOSIS — I10 ESSENTIAL HYPERTENSION: Primary | ICD-10-CM

## 2022-07-25 DIAGNOSIS — I10 PRIMARY HYPERTENSION: ICD-10-CM

## 2022-07-25 LAB
BILIRUBIN, POC: NORMAL
BLOOD URINE, POC: NORMAL
CLARITY, POC: NORMAL
COLOR, POC: NORMAL
GLUCOSE URINE, POC: NORMAL
KETONES, POC: NORMAL
LEUKOCYTE EST, POC: NORMAL
NITRITE, POC: NORMAL
PH, POC: 5.5
PROTEIN, POC: NORMAL
SPECIFIC GRAVITY, POC: 1.02
UROBILINOGEN, POC: 0.2

## 2022-07-25 PROCEDURE — 81002 URINALYSIS NONAUTO W/O SCOPE: CPT | Performed by: INTERNAL MEDICINE

## 2022-07-25 PROCEDURE — 99213 OFFICE O/P EST LOW 20 MIN: CPT | Performed by: INTERNAL MEDICINE

## 2022-07-25 RX ORDER — DEXTROMETHORPHAN HYDROBROMIDE AND PROMETHAZINE HYDROCHLORIDE 15; 6.25 MG/5ML; MG/5ML
5 SYRUP ORAL 4 TIMES DAILY PRN
Qty: 180 ML | Refills: 0 | Status: SHIPPED | OUTPATIENT
Start: 2022-07-25 | End: 2022-08-01

## 2022-07-25 SDOH — ECONOMIC STABILITY: FOOD INSECURITY: WITHIN THE PAST 12 MONTHS, THE FOOD YOU BOUGHT JUST DIDN'T LAST AND YOU DIDN'T HAVE MONEY TO GET MORE.: NEVER TRUE

## 2022-07-25 SDOH — ECONOMIC STABILITY: FOOD INSECURITY: WITHIN THE PAST 12 MONTHS, YOU WORRIED THAT YOUR FOOD WOULD RUN OUT BEFORE YOU GOT MONEY TO BUY MORE.: NEVER TRUE

## 2022-07-25 ASSESSMENT — SOCIAL DETERMINANTS OF HEALTH (SDOH): HOW HARD IS IT FOR YOU TO PAY FOR THE VERY BASICS LIKE FOOD, HOUSING, MEDICAL CARE, AND HEATING?: NOT HARD AT ALL

## 2022-07-25 NOTE — PROGRESS NOTES
Deirk Story (:  1970) is a 46 y.o. female,Established patient, here for evaluation of the following chief complaint(s):  Hypertension         ASSESSMENT/PLAN:  1. Essential hypertension  -     POCT Urinalysis no Micro  2. Primary hypertension  -     POCT Urinalysis no Micro      No follow-ups on file. Subjective   SUBJECTIVE/OBJECTIVE:  HPI patient comes in for an elevated blood pressure. She went to ED for the blood pressure because  Her vision was diminished. She drank lemon water and other remedies which did not help. She did  Have some lightheadedness and dizziness. Review of Systems       Objective   Vitals:    22 0959   BP: (!) 138/90   Site: Right Upper Arm   Position: Sitting   Cuff Size: Large Adult   Pulse: 86   SpO2: 94%   Weight: 255 lb (115.7 kg)   Height: 5' 3\" (1.6 m)      Wt Readings from Last 3 Encounters:   22 255 lb (115.7 kg)   22 260 lb (117.9 kg)   22 256 lb (116.1 kg)     BP Readings from Last 3 Encounters:   22 (!) 138/90   22 (!) 161/102   22 126/78     Body mass index is 45.17 kg/m². Facility age limit for growth percentiles is 20 years. Physical Exam  Constitutional:       General: She is not in acute distress. Appearance: Normal appearance. She is not ill-appearing. HENT:      Head: Normocephalic. Right Ear: Tympanic membrane normal.      Left Ear: Tympanic membrane normal.      Nose: Nose normal. No congestion or rhinorrhea. Cardiovascular:      Rate and Rhythm: Normal rate and regular rhythm. Pulmonary:      Effort: Pulmonary effort is normal. No respiratory distress. Breath sounds: No stridor. No wheezing or rhonchi. Musculoskeletal:      Cervical back: Normal range of motion and neck supple. Neurological:      Mental Status: She is alert. An electronic signature was used to authenticate this note.     --Philippe De Los Santos MD

## 2022-08-18 DIAGNOSIS — G93.2 PSEUDOTUMOR CEREBRI: ICD-10-CM

## 2022-08-18 RX ORDER — ACETAZOLAMIDE 250 MG/1
TABLET ORAL
Qty: 180 TABLET | Refills: 0 | Status: SHIPPED | OUTPATIENT
Start: 2022-08-18 | End: 2022-09-20 | Stop reason: SDUPTHER

## 2022-08-22 RX ORDER — ROSUVASTATIN CALCIUM 20 MG/1
20 TABLET, COATED ORAL DAILY
Qty: 90 TABLET | Refills: 2 | Status: SHIPPED | OUTPATIENT
Start: 2022-08-22

## 2022-08-22 NOTE — TELEPHONE ENCOUNTER
Recent Visits  Date Type Provider Dept   07/25/22 Office Visit Rober Anand MD [facility]   03/31/22 Office Visit Rober Anand MD [facility]   12/15/21 Office Visit Rober Anand MD [facility]   10/01/21 Office Visit Rober Anand MD [facility]   07/09/21 Office Visit Rober Anand MD [facility]   03/05/21 Office Visit Rober Anand MD [facility]   Showing recent visits within past 540 days with a meds authorizing provider and meeting all other requirements  Future Appointments  Date Type Provider Dept   08/26/22 Appointment Rober Anand MD [facility]   Showing future appointments within next 150 days with a meds authorizing provider and meeting all other requirements     7/25/2022

## 2022-08-26 ENCOUNTER — OFFICE VISIT (OUTPATIENT)
Dept: INTERNAL MEDICINE CLINIC | Age: 52
End: 2022-08-26
Payer: COMMERCIAL

## 2022-08-26 VITALS
DIASTOLIC BLOOD PRESSURE: 70 MMHG | WEIGHT: 255 LBS | HEIGHT: 64 IN | HEART RATE: 93 BPM | OXYGEN SATURATION: 98 % | BODY MASS INDEX: 43.54 KG/M2 | SYSTOLIC BLOOD PRESSURE: 122 MMHG

## 2022-08-26 DIAGNOSIS — E78.5 HYPERLIPIDEMIA, UNSPECIFIED HYPERLIPIDEMIA TYPE: ICD-10-CM

## 2022-08-26 DIAGNOSIS — Z12.31 ENCOUNTER FOR SCREENING MAMMOGRAM FOR MALIGNANT NEOPLASM OF BREAST: ICD-10-CM

## 2022-08-26 DIAGNOSIS — I10 PRIMARY HYPERTENSION: Primary | ICD-10-CM

## 2022-08-26 PROCEDURE — 99214 OFFICE O/P EST MOD 30 MIN: CPT | Performed by: INTERNAL MEDICINE

## 2022-08-26 ASSESSMENT — ANXIETY QUESTIONNAIRES
1. FEELING NERVOUS, ANXIOUS, OR ON EDGE: 0
3. WORRYING TOO MUCH ABOUT DIFFERENT THINGS: 0
IF YOU CHECKED OFF ANY PROBLEMS ON THIS QUESTIONNAIRE, HOW DIFFICULT HAVE THESE PROBLEMS MADE IT FOR YOU TO DO YOUR WORK, TAKE CARE OF THINGS AT HOME, OR GET ALONG WITH OTHER PEOPLE: NOT DIFFICULT AT ALL
7. FEELING AFRAID AS IF SOMETHING AWFUL MIGHT HAPPEN: 0
4. TROUBLE RELAXING: 0
5. BEING SO RESTLESS THAT IT IS HARD TO SIT STILL: 0
GAD7 TOTAL SCORE: 0
2. NOT BEING ABLE TO STOP OR CONTROL WORRYING: 0
6. BECOMING EASILY ANNOYED OR IRRITABLE: 0

## 2022-08-26 ASSESSMENT — PATIENT HEALTH QUESTIONNAIRE - PHQ9
2. FEELING DOWN, DEPRESSED OR HOPELESS: 0
SUM OF ALL RESPONSES TO PHQ QUESTIONS 1-9: 0
DEPRESSION UNABLE TO ASSESS: FUNCTIONAL CAPACITY MOTIVATION LIMITS ACCURACY
SUM OF ALL RESPONSES TO PHQ QUESTIONS 1-9: 0
6. FEELING BAD ABOUT YOURSELF - OR THAT YOU ARE A FAILURE OR HAVE LET YOURSELF OR YOUR FAMILY DOWN: 0
10. IF YOU CHECKED OFF ANY PROBLEMS, HOW DIFFICULT HAVE THESE PROBLEMS MADE IT FOR YOU TO DO YOUR WORK, TAKE CARE OF THINGS AT HOME, OR GET ALONG WITH OTHER PEOPLE: 0
8. MOVING OR SPEAKING SO SLOWLY THAT OTHER PEOPLE COULD HAVE NOTICED. OR THE OPPOSITE, BEING SO FIGETY OR RESTLESS THAT YOU HAVE BEEN MOVING AROUND A LOT MORE THAN USUAL: 0
SUM OF ALL RESPONSES TO PHQ QUESTIONS 1-9: 0
9. THOUGHTS THAT YOU WOULD BE BETTER OFF DEAD, OR OF HURTING YOURSELF: 0
1. LITTLE INTEREST OR PLEASURE IN DOING THINGS: 0
SUM OF ALL RESPONSES TO PHQ9 QUESTIONS 1 & 2: 0
SUM OF ALL RESPONSES TO PHQ QUESTIONS 1-9: 0
7. TROUBLE CONCENTRATING ON THINGS, SUCH AS READING THE NEWSPAPER OR WATCHING TELEVISION: 0
3. TROUBLE FALLING OR STAYING ASLEEP: 0
5. POOR APPETITE OR OVEREATING: 0
4. FEELING TIRED OR HAVING LITTLE ENERGY: 0

## 2022-08-26 NOTE — PROGRESS NOTES
Mary Jane Mittal (:  1970) is a 46 y.o. female,Established patient, here for evaluation of the following chief complaint(s):  Hypertension (Follow up)         ASSESSMENT/PLAN:  1. Primary hypertension  Stable  -  continue benicar    2. Encounter for screening mammogram for malignant neoplasm of breast  -     JACKELYN DIGITAL SCREEN W OR WO CAD BILATERAL; Future  3. Hyperlipidemia, unspecified hyperlipidemia type  Stable  -  continue crestor    Return in about 5 months (around 2023) for hypertension 30 min. Subjective   SUBJECTIVE/OBJECTIVE:  HPIHypertension:  Home blood pressure monitoring: No.  She is adherent to a low sodium diet. Patient denies chest pain, shortness of breath, and headache. Antihypertensive medication side effects: no medication side effects noted. Use of agents associated with hypertension: none. Sodium (mmol/L)   Date Value   2022 138    BUN (mg/dL)   Date Value   2022 13    Glucose (mg/dL)   Date Value   2022 81      Potassium (mmol/L)   Date Value   2022 3.6     Potassium reflex Magnesium (mmol/L)   Date Value   2021 3.5    Creatinine (mg/dL)   Date Value   2022 0.9            Review of Systems   Constitutional:  Negative for chills and diaphoresis. HENT:  Negative for dental problem and drooling. Eyes:  Negative for pain and redness. Respiratory:  Negative for cough and choking. Endocrine: Negative for heat intolerance and polydipsia. Objective   Vitals:    22 1131   BP: 122/70   Pulse: 93   SpO2: 98%   Weight: 255 lb (115.7 kg)   Height: 5' 4\" (1.626 m)      Wt Readings from Last 3 Encounters:   22 255 lb (115.7 kg)   22 255 lb (115.7 kg)   22 260 lb (117.9 kg)     BP Readings from Last 3 Encounters:   22 122/70   22 (!) 138/90   22 (!) 161/102     Body mass index is 43.77 kg/m². Facility age limit for growth percentiles is 20 years. Physical Exam  Constitutional:       General: She is not in acute distress. Appearance: Normal appearance. She is not ill-appearing. HENT:      Head: Normocephalic. Right Ear: Tympanic membrane normal.      Left Ear: Tympanic membrane normal.      Nose: Nose normal. No congestion or rhinorrhea. Mouth/Throat:      Mouth: Mucous membranes are moist.      Pharynx: No oropharyngeal exudate or posterior oropharyngeal erythema. Eyes:      General:         Right eye: No discharge. Left eye: No discharge. Pupils: Pupils are equal, round, and reactive to light. Cardiovascular:      Rate and Rhythm: Normal rate and regular rhythm. Pulmonary:      Effort: Pulmonary effort is normal. No respiratory distress. Breath sounds: No stridor. Musculoskeletal:      Cervical back: Normal range of motion and neck supple. No rigidity or tenderness. Neurological:      Mental Status: She is alert. An electronic signature was used to authenticate this note.     --Leigh Ann Mcelroy MD

## 2022-08-28 ASSESSMENT — ENCOUNTER SYMPTOMS
CHOKING: 0
EYE PAIN: 0
EYE REDNESS: 0
COUGH: 0

## 2022-09-20 ENCOUNTER — OFFICE VISIT (OUTPATIENT)
Dept: NEUROLOGY | Age: 52
End: 2022-09-20
Payer: COMMERCIAL

## 2022-09-20 VITALS
WEIGHT: 254 LBS | BODY MASS INDEX: 43.36 KG/M2 | HEART RATE: 92 BPM | SYSTOLIC BLOOD PRESSURE: 130 MMHG | HEIGHT: 64 IN | DIASTOLIC BLOOD PRESSURE: 95 MMHG

## 2022-09-20 DIAGNOSIS — G93.2 PSEUDOTUMOR CEREBRI: Primary | ICD-10-CM

## 2022-09-20 DIAGNOSIS — E66.01 MORBID OBESITY (HCC): ICD-10-CM

## 2022-09-20 PROCEDURE — 99214 OFFICE O/P EST MOD 30 MIN: CPT | Performed by: PSYCHIATRY & NEUROLOGY

## 2022-09-20 RX ORDER — ACETAZOLAMIDE 250 MG/1
TABLET ORAL
Qty: 180 TABLET | Refills: 1 | Status: SHIPPED | OUTPATIENT
Start: 2022-09-20

## 2022-09-20 NOTE — PROGRESS NOTES
History     Socioeconomic History    Marital status: Single     Spouse name: None    Number of children: None    Years of education: None    Highest education level: None   Tobacco Use    Smoking status: Never    Smokeless tobacco: Never   Vaping Use    Vaping Use: Never used   Substance and Sexual Activity    Alcohol use: Yes     Alcohol/week: 1.0 - 2.0 standard drink     Types: 1 Standard drinks or equivalent per week     Comment: occassionally    Drug use: No    Sexual activity: Yes     Partners: Male     Social Determinants of Health     Financial Resource Strain: Low Risk     Difficulty of Paying Living Expenses: Not hard at all   Food Insecurity: No Food Insecurity    Worried About Running Out of Food in the Last Year: Never true    Ran Out of Food in the Last Year: Never true        Objective:  Exam:  BP (!) 130/95   Pulse 92   Ht 5' 4\" (1.626 m)   Wt 254 lb (115.2 kg)   BMI 43.60 kg/m²   This is a well-nourished patient in no acute distress  Patient is awake, alert and oriented x3. Speech is normal.  Pupils are equal round reacting to light. Extraocular movements intact. Face symmetrical. Tongue midline. Motor exam shows normal symmetrical strength. Deep tendon reflexes normal. Plantar reflexes downgoing. Sensory exam normal. Coordination normal. Gait normal. No carotid bruit. No neck stiffness. Impression :  Pseudotumor cerebri, doing well  Obesity    Plan :  Continue acetazolamide 250 mg, one tablet b.i.d. Recommended that patient continue to lose weight  Refer patient to bariatric surgery for consultation  Return in 6 months        Please note a portion of  this chart was generated using dragon dictation software. Although every effort was made to ensure the accuracy of this automated transcription, some errors in transcription may have occurred.

## 2022-11-16 ENCOUNTER — OFFICE VISIT (OUTPATIENT)
Dept: BARIATRICS/WEIGHT MGMT | Age: 52
End: 2022-11-16

## 2022-11-16 VITALS
HEART RATE: 98 BPM | SYSTOLIC BLOOD PRESSURE: 126 MMHG | BODY MASS INDEX: 44.08 KG/M2 | OXYGEN SATURATION: 98 % | HEIGHT: 64 IN | DIASTOLIC BLOOD PRESSURE: 78 MMHG | RESPIRATION RATE: 18 BRPM | WEIGHT: 258.2 LBS

## 2022-11-16 DIAGNOSIS — E66.01 MORBID OBESITY WITH BMI OF 40.0-44.9, ADULT (HCC): Primary | ICD-10-CM

## 2022-11-16 PROCEDURE — 99999 PR OFFICE/OUTPT VISIT,PROCEDURE ONLY: CPT

## 2022-11-16 NOTE — PROGRESS NOTES
Ernie Arnold is a 46 y.o. female with a date of birth of 1970. Vitals:    11/16/22 1350   BP: 126/78   Pulse: 98   Resp: 18   SpO2: 98%   Weight: 258 lb 3.2 oz (117.1 kg)   Height: 5' 4\" (1.626 m)    BMI: Body mass index is 43.6 kg/m². Obesity Classification: Class III    Weight History: Wt Readings from Last 3 Encounters:   09/20/22 254 lb (115.2 kg)   08/26/22 255 lb (115.7 kg)   07/25/22 255 lb (115.7 kg)     Patient's lowest adult weight was 225 lb at age late 24's-55's. Patient's highest adult weight was 260 lb at age 47-54. Patient has participated in the following weight loss programs: Cabbage Soup Diet, grapefruit, herbalife, low carb and juice/smoothies. Patient has participated in meal replacement/liquid diets. Slimfast  Patient has not participated in weight loss medications. Patient is not lactose intolerant. Patient does not have food allergies. Patient does not have Confucianist/cultural food preferences. 24 hour recall/food frequency chart: Dalia 112 hours vary 7 AM- 3-4 PM or starts later may work until early morning. Does not have string hunger sensation. Breakfast: 2 slice shellye + bagel + HB egg  Snack: Nuts + smoothie OR None  Lunch: 1/2 [place] [employer] sandwich OR None  Snack: Nuts + Smoothie OR None  Dinner: 1/2 [place] [employer] sandwich + brussles sprouts OR [place]/ ox tail/ chix + veggies + mac'n'cheese or potato OR just brussels sprout, no protein   Snack: None  Drinks throughout the day: 60 oz water, Starbucks 2-3X/year, herbal tea  Do you drink alcohol? yes. How often/how much alcohol do you drink: 1-3 Mixed drinks per week. Patient does not meet the criteria for binge eating disorder. Patient does not have grazing. Patient does not have night eating. Patient does not have a history of emotional eating or eating out of boredom.       Physical Activity: walking ~ 2 miles / yoga on ball 3X/week, aims for minimum 6K steps/day    Goals  Weight: 200 lbs  Health Improvement: improve pseudotumor cerebri (minimal symptoms at this time), decrease medication, get more into yoga    Assessment  Nutritional Needs: RMR=(9.99 x 117.1) + (6.25 x 162.6) - (4.92 x 52 y.o.) -161= 1769 kcal x 1.3 (sedentary activity factor)= 2300 kcal - 1000 (for 2 lb weight loss/week)= 1300 kcal.    Plan  Plan/Recommendations: General weight loss/lifestyle modification strategies discussed (elicit support from others; identify saboteurs; non-food rewards, etc). Diet interventions: 1200 kcal LC. Regular aerobic exercise program discussed. Optifast:  Interested - samples provided  Diet Medications:  Interested     Handouts: LC plate, MFP instructions, Protein shakes    PES Statement: Overweight/Obesity related to lack of exercise, sedentary lifestyle, unhealthy eating habits, and unsuccessful diet attempts as evidenced by BMI. Body mass index is 43.6 kg/m². Will follow up as necessary.     Modesto Sexton, FERNANDO, PAIGE

## 2022-11-17 ENCOUNTER — TELEMEDICINE (OUTPATIENT)
Dept: BARIATRICS/WEIGHT MGMT | Age: 52
End: 2022-11-17
Payer: COMMERCIAL

## 2022-11-17 DIAGNOSIS — E66.01 MORBID OBESITY WITH BMI OF 40.0-44.9, ADULT (HCC): Primary | ICD-10-CM

## 2022-11-17 DIAGNOSIS — Z71.3 DIETARY COUNSELING AND SURVEILLANCE: ICD-10-CM

## 2022-11-17 PROCEDURE — 99204 OFFICE O/P NEW MOD 45 MIN: CPT | Performed by: FAMILY MEDICINE

## 2022-11-17 ASSESSMENT — ENCOUNTER SYMPTOMS
ABDOMINAL PAIN: 0
COUGH: 0
CHOKING: 0
CHEST TIGHTNESS: 0
BLOOD IN STOOL: 0
CONSTIPATION: 0
WHEEZING: 0
ABDOMINAL DISTENTION: 0
PHOTOPHOBIA: 0
EYE PAIN: 0
SHORTNESS OF BREATH: 0
VOMITING: 0
APNEA: 0
DIARRHEA: 0
NAUSEA: 0

## 2022-11-17 NOTE — PROGRESS NOTES
Patient: Mac Perez     Encounter Date: 11/17/2022    YOB: 1970               Age: 46 y.o. Patient identification was verified at the start of the visit. Patient-Reported Vitals 11/14/2022   Patient-Reported Weight 259   Patient-Reported Height 5'3         BP Readings from Last 1 Encounters:   11/16/22 126/78       BMI Readings from Last 1 Encounters:   11/16/22 44.32 kg/m²       Pulse Readings from Last 1 Encounters:   11/16/22 98                                             Wt Readings from Last 3 Encounters:   11/16/22 258 lb 3.2 oz (117.1 kg)   09/20/22 254 lb (115.2 kg)   08/26/22 255 lb (115.7 kg)        Chief Complaint   Patient presents with    Bariatric, Initial Visit     MWM- NP       HPI:    46 y.o. female presents to establish care via video visit. She was referred by Dr. Huyen Stovall for weight management. The patient's medical history is significant for class III obesity. The patient has a long-standing history of obesity which started gradually. The problem is severe. The patient has been gaining weight. Risk factors include annual weight gain of >2 lbs (1 kg)/ year. Aggravating factors include poor diet. The patient has tried various diet/exercise plans which have been ineffective in the long-run. she is motivated to start losing weight to help improve her health. Physical Activity: walking ~ 2 miles / yoga on ball 3X/week, aims for minimum 6K steps/day    When did you become overweight? [] Childhood   [] Teens   [x] Adulthood   [] Pregnancy   [] Menopause    Highest adult weight: 260 pounds at age 47-54    Triggers for weight gain? [] Stress   [] Illness   [] Medications   [] Travel  []Injury     [] Nightshift work   [] Insomnia  [x] No specific triggers   [] Other    Food triggers:   [x] Stress   [x] Boredom   [x] Fast food   [x] Eating out   [] Seeking reward   [] Social     Have you ever taken prescription medications to help you lose weight?    [] Yes  [x] No    Have you ever been on a meal replacement program?  [] Yes  [x] No    How many hours of sleep do you get each night?  [] <6 hours  [x] 6-8 hours  [] >8 hours    Do you have sleep apnea? [] Yes  [] No   [x] Unknown         The patient denies any significant cardiac or psychiatric disease. The patient denies a history thyroid disease. The patient denies a history of glaucoma. The patient denies a history of nephrolithiasis. The patient denies a history of seizure disorders/epiliepsy. Dietitian's assessment reviewed and addressed with patient. Reviewed:  [x] Nutrition and the importance of regular protein intake  [x] Hidden CHO/carbohydrate sources  [x] Alcohol use  [x] Tobacco use  [x] Drug use- Denies   [x] Importance of exercise and reducing sedentary time        Controlled Substance Monitoring:     No flowsheet data found.      Allergies   Allergen Reactions    Lisinopril          Current Outpatient Medications:     acetaZOLAMIDE (DIAMOX) 250 MG tablet, TAKE 1 TABLET TWICE A DAY, Disp: 180 tablet, Rfl: 1    rosuvastatin (CRESTOR) 20 MG tablet, Take 1 tablet by mouth daily, Disp: 90 tablet, Rfl: 2    olmesartan-hydroCHLOROthiazide (BENICAR HCT) 40-25 MG per tablet, TAKE 1 TABLET DAILY, Disp: 90 tablet, Rfl: 3    cetirizine (ZYRTEC) 10 MG tablet, Take 1 tablet by mouth daily, Disp: 30 tablet, Rfl: 5    fluticasone (FLONASE) 50 MCG/ACT nasal spray, 2 sprays by Each Nostril route daily, Disp: 48 g, Rfl: 1    aspirin 81 MG EC tablet, Take 1 tablet by mouth daily, Disp: 30 tablet, Rfl: 3    estradiol (VIVELLE) 0.05 MG/24HR, Place 1 patch onto the skin, Disp: , Rfl:     MULTIPLE VITAMIN PO, Take by mouth, Disp: , Rfl:     Omega-3 Fatty Acids (FISH OIL PO), Take by mouth, Disp: , Rfl:     VITAMIN D PO, Take by mouth, Disp: , Rfl:     UNABLE TO FIND, Black Seed Oil, Disp: , Rfl:     Patient Active Problem List   Diagnosis    Essential hypertension    Mixed hyperlipidemia    Morbid obesity with BMI of 40.0-44.9, adult ([facility] [address]    Pseudotumor cerebri    Benign intracranial hypertension    Palpitations    PVC (premature ventricular contraction)    Mild persistent asthma    Obstructive sleep apnea syndrome    Adenomatous polyp of cecum    Adenomatous polyp of ascending colon    Diverticulosis of colon without hemorrhage    Internal hemorrhoids without complication    Chest pain    Left arm numbness       Past Medical History:   Diagnosis Date    Achilles rupture     repaired    Asthma     very mild, normal PFTs    Hyperlipidemia     Hypertension     Murmur, heart     Obstructive sleep apnea (adult) (pediatric)     Pericarditis     Prolonged emergence from general anesthesia     Pseudotumor cerebri 12/21/2011       Past Surgical History:   Procedure Laterality Date    ACHILLES TENDON SURGERY      COLONOSCOPY  2008    COLONOSCOPY N/A 12/18/2020    COLONOSCOPY POLYPECTOMY SNARE/COLD performed by Vik Roy MD at . [address])  1/2009    OVARY REMOVAL  2019    TONSILLECTOMY  2005    TUBAL LIGATION         Family History   Problem Relation Age of Onset    Cancer Father     Diabetes Father     Heart Failure Father     Hypertension Father     Stroke Father     Migraines Father     Other Father         HANK    Kidney Disease Father     Coronary Art Dis Brother     Other Sister         HANK    Diabetes Sister     Other Mother         fibromyalgia    Osteoporosis Mother     Diabetes Mother     Breast Cancer Mother         over 48       Review of Systems   Constitutional:  Negative for fatigue. Eyes:  Negative for photophobia, pain and visual disturbance. Respiratory:  Negative for apnea, cough, choking, chest tightness, shortness of breath and wheezing. Cardiovascular:  Negative for chest pain, palpitations and leg swelling. Gastrointestinal:  Negative for abdominal distention, abdominal pain, blood in stool, constipation, diarrhea, nausea and vomiting.    Endocrine: Negative for cold intolerance and heat intolerance. Musculoskeletal:  Negative for arthralgias and myalgias. Skin:  Negative for rash. Neurological:  Negative for dizziness, tremors, syncope, weakness, numbness and headaches. Psychiatric/Behavioral:  Negative for agitation, confusion, decreased concentration, dysphoric mood, hallucinations, sleep disturbance and suicidal ideas. The patient is not nervous/anxious and is not hyperactive. Physical Exam  Constitutional:       Appearance: She is well-developed. HENT:      Head: Normocephalic. Eyes:      Conjunctiva/sclera: Conjunctivae normal.   Abdominal:      General: Abdomen is protuberant. Musculoskeletal:         General: No swelling. Neurological:      Mental Status: She is alert and oriented to person, place, and time. Psychiatric:         Mood and Affect: Mood normal.         Behavior: Behavior normal.         Thought Content: Thought content normal.         Judgment: Judgment normal.       Office Visit on 07/25/2022   Component Date Value Ref Range Status    Color, UA 07/25/2022 Nahomi   Final    Clarity, UA 07/25/2022 Cloudy   Final    Glucose, UA POC 07/25/2022 NEG   Final    Bilirubin, UA 07/25/2022 NEG   Final    Ketones, UA 07/25/2022 NEG   Final    Spec Grav, UA 07/25/2022 1.025   Final    Blood, UA POC 07/25/2022 NEG   Final    pH, UA 07/25/2022 5.5   Final    Protein, UA POC 07/25/2022 NEG   Final    Urobilinogen, UA 07/25/2022 0.2   Final    Leukocytes, UA 07/25/2022 NEG   Final    Nitrite, UA 07/25/2022 NEG   Final         Assessment and Plan:    1. Morbid obesity with BMI of 40.0-44.9, adult ([facility] [address]  Heavily counseled on the importance of therapeutic lifestyle changes through diet and exercise. The patient understands that the goal of treatment is to reach and stay at a healthy weight. The initial treatment goal is to lose at least 5-10% of her body weight in 12 weeks.  This will require changes in eating habits, increased physical activity, and behavior changes. Counseled on low carb/alejandrina diet. Patient handouts and education material provided and reviewed in detail with the patient. All questions answered. Encouraged patient to keep a food journal and to bring it to her next visit. Discussed available treatment options in addition to lifestyle changes including medications or OPTIFAST. Explained that medications/meal replacements are most effective as part of a comprehensive treatment plan that includes proper nutrition, physical activity, and behavior modification. The patient understands that she will need close follow-ups every 2-4 weeks if she starts treatment. She would like to start with lifestyle management before considering medications or Optifast. Depending on the patient's success with these changes, she may also be a good candidate for bariatric surgery down the line. Follow-up in 4-6 weeks. - TSH with Reflex; Future  - Vitamin B12 & Folate; Future  - Vitamin D 25 Hydroxy; Future  - Comprehensive Metabolic Panel; Future  - Hemoglobin A1C; Future  - Lipid Panel; Future  - CBC; Future    2. Dietary counseling and surveillance  Start with meal plan as prescribed. Avoid skipping meals. Plan/prep meals ahead of time. Avoid soda/juice/sugary drinks. Be mindful of portion sizes.          Nutrition:  [] LCHF/Ketogenic [x] Low carb/low-calorie diet [] Low-calorie diet     []Maintenance        FITTE:   [x] Cardio [] Resistance/stength exercises   [x] ACSM recommendations (150 minutes/week)           Behavior:   [x] Motivational interviewing performed    [] Referral for counseling  [x] Discussed strategies to overcome habits/challenges for focus      [x] Stress management   [x] Stimulus control  [x] Sleep hygiene      Orders Placed This Encounter   Procedures    TSH with Reflex     Standing Status:   Future     Standing Expiration Date:   11/17/2023    Vitamin B12 & Folate     Standing Status:   Future     Standing Expiration Date: 11/17/2023    Vitamin D 25 Hydroxy     Standing Status:   Future     Standing Expiration Date:   11/17/2023    Comprehensive Metabolic Panel     Standing Status:   Future     Standing Expiration Date:   11/17/2023    Hemoglobin A1C     Standing Status:   Future     Standing Expiration Date:   11/17/2023    Lipid Panel     Standing Status:   Future     Standing Expiration Date:   11/17/2023    CBC     Standing Status:   Future     Standing Expiration Date:   11/17/2023         No follow-ups on file. Brenden Dove is a 46 y.o. female being evaluated by a Virtual Visit (video visit) encounter to address concerns as mentioned above. A caregiver was present when appropriate. Due to this being a TeleHealth encounter ([facility]), evaluation of the following organ systems was limited: Vitals/Constitutional/EENT/Resp/CV/GI//MS/Neuro/Skin/Heme-Lymph-Imm. Pursuant to the emergency declaration under the [address] [place] [address] and the [employer] and [employer] Act, this Virtual Visit was conducted with patient's (and/or legal guardian's) consent, to reduce the patient's risk of exposure to COVID-19 and provide necessary medical care. The patient (and/or legal guardian) has also been advised to contact this office for worsening conditions or problems, and seek emergency medical treatment and/or call 911 if deemed necessary. Services were provided through a video synchronous discussion virtually to substitute for in-person clinic visit. Patient and provider were located at their individual homes. --Sekou Ann MD on 11/17/2022 at 3:25 PM    An electronic signature was used to authenticate this note.      Greater than 50% of this 45 minute visit was used for  -Preparing to see the patient such as reviewing the pt records   Obtaining and/or reviewing separately obtained history   Performing a medically appropriate history    Counseling and educating the patient, family, and/or caregiver   Ordering prescirption medications, tests, or procedures   Documenting clinical information in the electronic or other health record

## 2022-12-16 DIAGNOSIS — E66.01 MORBID OBESITY WITH BMI OF 40.0-44.9, ADULT (HCC): ICD-10-CM

## 2022-12-16 LAB
A/G RATIO: 1.6 (ref 1.1–2.2)
ALBUMIN SERPL-MCNC: 4.1 G/DL (ref 3.4–5)
ALP BLD-CCNC: 90 U/L (ref 40–129)
ALT SERPL-CCNC: 14 U/L (ref 10–40)
ANION GAP SERPL CALCULATED.3IONS-SCNC: 13 MMOL/L (ref 3–16)
AST SERPL-CCNC: 15 U/L (ref 15–37)
BILIRUB SERPL-MCNC: 0.3 MG/DL (ref 0–1)
BUN BLDV-MCNC: 17 MG/DL (ref 7–20)
CALCIUM SERPL-MCNC: 8.9 MG/DL (ref 8.3–10.6)
CHLORIDE BLD-SCNC: 104 MMOL/L (ref 99–110)
CHOLESTEROL, TOTAL: 192 MG/DL (ref 0–199)
CO2: 22 MMOL/L (ref 21–32)
CREAT SERPL-MCNC: 0.9 MG/DL (ref 0.6–1.1)
GFR SERPL CREATININE-BSD FRML MDRD: >60 ML/MIN/{1.73_M2}
GLUCOSE BLD-MCNC: 124 MG/DL (ref 70–99)
HCT VFR BLD CALC: 40.4 % (ref 36–48)
HDLC SERPL-MCNC: 37 MG/DL (ref 40–60)
HEMOGLOBIN: 12.9 G/DL (ref 12–16)
LDL CHOLESTEROL CALCULATED: 130 MG/DL
MCH RBC QN AUTO: 31.9 PG (ref 26–34)
MCHC RBC AUTO-ENTMCNC: 31.9 G/DL (ref 31–36)
MCV RBC AUTO: 99.9 FL (ref 80–100)
PDW BLD-RTO: 14.9 % (ref 12.4–15.4)
PLATELET # BLD: 234 K/UL (ref 135–450)
PMV BLD AUTO: 8.1 FL (ref 5–10.5)
POTASSIUM SERPL-SCNC: 4.2 MMOL/L (ref 3.5–5.1)
RBC # BLD: 4.05 M/UL (ref 4–5.2)
SODIUM BLD-SCNC: 139 MMOL/L (ref 136–145)
TOTAL PROTEIN: 6.6 G/DL (ref 6.4–8.2)
TRIGL SERPL-MCNC: 125 MG/DL (ref 0–150)
TSH REFLEX: 1.49 UIU/ML (ref 0.27–4.2)
VITAMIN D 25-HYDROXY: 25.1 NG/ML
VLDLC SERPL CALC-MCNC: 25 MG/DL
WBC # BLD: 5.9 K/UL (ref 4–11)

## 2022-12-17 LAB
ESTIMATED AVERAGE GLUCOSE: 128.4 MG/DL
FOLATE: 11.36 NG/ML (ref 4.78–24.2)
HBA1C MFR BLD: 6.1 %
VITAMIN B-12: 972 PG/ML (ref 211–911)

## 2022-12-22 ENCOUNTER — TELEMEDICINE (OUTPATIENT)
Dept: BARIATRICS/WEIGHT MGMT | Age: 52
End: 2022-12-22
Payer: COMMERCIAL

## 2022-12-22 DIAGNOSIS — R73.03 PREDIABETES: ICD-10-CM

## 2022-12-22 DIAGNOSIS — E55.9 VITAMIN D INSUFFICIENCY: ICD-10-CM

## 2022-12-22 DIAGNOSIS — Z71.3 DIETARY COUNSELING AND SURVEILLANCE: ICD-10-CM

## 2022-12-22 DIAGNOSIS — E78.49 OTHER HYPERLIPIDEMIA: ICD-10-CM

## 2022-12-22 DIAGNOSIS — E66.01 MORBID OBESITY WITH BMI OF 40.0-44.9, ADULT (HCC): Primary | ICD-10-CM

## 2022-12-22 PROCEDURE — 99214 OFFICE O/P EST MOD 30 MIN: CPT | Performed by: FAMILY MEDICINE

## 2022-12-22 ASSESSMENT — ENCOUNTER SYMPTOMS
COUGH: 0
CONSTIPATION: 0
CHOKING: 0
ABDOMINAL PAIN: 0
SHORTNESS OF BREATH: 0
VOMITING: 0
CHEST TIGHTNESS: 0
ABDOMINAL DISTENTION: 0
BLOOD IN STOOL: 0
DIARRHEA: 0
NAUSEA: 0
EYE PAIN: 0
PHOTOPHOBIA: 0
WHEEZING: 0
APNEA: 0

## 2022-12-22 NOTE — PROGRESS NOTES
Patient: Ana Del Cid                      Encounter Date: 12/22/2022    YOB: 1970               Age: 46 y.o. Chief Complaint   Patient presents with    Weight Management     F/u MWM       Patient identification was verified at the start of the visit. Patient-Reported Vitals 12/22/2022   Patient-Reported Weight 260   Patient-Reported Height 5'4   Patient-Reported Systolic 853   Patient-Reported Diastolic 76   Patient-Reported Pulse 85   Patient-Reported Temperature 98         BP Readings from Last 1 Encounters:   11/16/22 126/78       BMI Readings from Last 1 Encounters:   11/16/22 44.32 kg/m²       Pulse Readings from Last 1 Encounters:   11/16/22 98     Wt Readings from Last 3 Encounters:   11/16/22 258 lb 3.2 oz (117.1 kg)   09/20/22 254 lb (115.2 kg)   08/26/22 255 lb (115.7 kg)           HPI: 46 y.o. female with a long-standing history of obesity presents today for a virtual video follow-up. She has gained 2 pounds since her last visit. She hasn't made any significant dietary changes since her last visit. Continues to be physically active.      Labs completed and reviewed with patient     Vit D 25.1  HbA1c 6.1           Allergies   Allergen Reactions    Lisinopril          Current Outpatient Medications:     metFORMIN (GLUCOPHAGE) 500 MG tablet, Take 1 tablet by mouth daily (with breakfast), Disp: 30 tablet, Rfl: 5    acetaZOLAMIDE (DIAMOX) 250 MG tablet, TAKE 1 TABLET TWICE A DAY, Disp: 180 tablet, Rfl: 1    rosuvastatin (CRESTOR) 20 MG tablet, Take 1 tablet by mouth daily, Disp: 90 tablet, Rfl: 2    olmesartan-hydroCHLOROthiazide (BENICAR HCT) 40-25 MG per tablet, TAKE 1 TABLET DAILY, Disp: 90 tablet, Rfl: 3    cetirizine (ZYRTEC) 10 MG tablet, Take 1 tablet by mouth daily, Disp: 30 tablet, Rfl: 5    fluticasone (FLONASE) 50 MCG/ACT nasal spray, 2 sprays by Each Nostril route daily, Disp: 48 g, Rfl: 1    aspirin 81 MG EC tablet, Take 1 tablet by mouth daily, Disp: 30 tablet, Rfl: 3    estradiol (VIVELLE) 0.05 MG/24HR, Place 1 patch onto the skin, Disp: , Rfl:     MULTIPLE VITAMIN PO, Take by mouth, Disp: , Rfl:     Omega-3 Fatty Acids (FISH OIL PO), Take by mouth, Disp: , Rfl:     VITAMIN D PO, Take by mouth, Disp: , Rfl:     UNABLE TO FIND, Black Seed Oil, Disp: , Rfl:     Patient Active Problem List   Diagnosis    Essential hypertension    Mixed hyperlipidemia    Morbid obesity with BMI of 40.0-44.9, adult (HCC)    Pseudotumor cerebri    Benign intracranial hypertension    Palpitations    PVC (premature ventricular contraction)    Mild persistent asthma    Obstructive sleep apnea syndrome    Adenomatous polyp of cecum    Adenomatous polyp of ascending colon    Diverticulosis of colon without hemorrhage    Internal hemorrhoids without complication    Chest pain    Left arm numbness       Review of Systems   Constitutional:  Negative for fatigue. Eyes:  Negative for photophobia, pain and visual disturbance. Respiratory:  Negative for apnea, cough, choking, chest tightness, shortness of breath and wheezing. Cardiovascular:  Negative for chest pain, palpitations and leg swelling. Gastrointestinal:  Negative for abdominal distention, abdominal pain, blood in stool, constipation, diarrhea, nausea and vomiting. Endocrine: Negative for cold intolerance and heat intolerance. Musculoskeletal:  Negative for arthralgias and myalgias. Skin:  Negative for rash. Neurological:  Negative for dizziness, tremors, syncope, weakness, numbness and headaches. Psychiatric/Behavioral:  Negative for agitation, confusion, decreased concentration, dysphoric mood, hallucinations, sleep disturbance and suicidal ideas. The patient is not nervous/anxious and is not hyperactive. Physical Exam  Constitutional:       Appearance: She is well-developed. HENT:      Head: Normocephalic. Eyes:      Conjunctiva/sclera: Conjunctivae normal.   Abdominal:      General: Abdomen is protuberant. Musculoskeletal:         General: No swelling. Neurological:      Mental Status: She is alert and oriented to person, place, and time. Psychiatric:         Mood and Affect: Mood normal.         Behavior: Behavior normal.         Thought Content: Thought content normal.         Judgment: Judgment normal.       Orders Only on 12/16/2022   Component Date Value Ref Range Status    WBC 12/16/2022 5.9  4.0 - 11.0 K/uL Final    RBC 12/16/2022 4.05  4.00 - 5.20 M/uL Final    Hemoglobin 12/16/2022 12.9  12.0 - 16.0 g/dL Final    Hematocrit 12/16/2022 40.4  36.0 - 48.0 % Final    MCV 12/16/2022 99.9  80.0 - 100.0 fL Final    MCH 12/16/2022 31.9  26.0 - 34.0 pg Final    MCHC 12/16/2022 31.9  31.0 - 36.0 g/dL Final    RDW 12/16/2022 14.9  12.4 - 15.4 % Final    Platelets 06/68/7197 234  135 - 450 K/uL Final    MPV 12/16/2022 8.1  5.0 - 10.5 fL Final    Cholesterol, Total 12/16/2022 192  0 - 199 mg/dL Final    Triglycerides 12/16/2022 125  0 - 150 mg/dL Final    HDL 12/16/2022 37 (A)  40 - 60 mg/dL Final    Comment: An HDL cholesterol less than 40 mg/dL is low and  constitutes a coronary heart disease risk factor. An HDL cholesterol greater than 60 mg/dL is a  negative risk factor for coronary heart disease.       LDL Calculated 12/16/2022 130 (A)  <100 mg/dL Final    VLDL Cholesterol Calculated 12/16/2022 25  Not Established mg/dL Final    Hemoglobin A1C 12/16/2022 6.1  See comment % Final    Comment: Comment:  Diagnosis of Diabetes: > or = 6.5%  Increased risk of diabetes (Prediabetes): 5.7-6.4%  Glycemic Control: Nonpregnant Adults: <7.0%                    Pregnant: <6.0%        eAG 12/16/2022 128.4  mg/dL Final    Sodium 12/16/2022 139  136 - 145 mmol/L Final    Potassium 12/16/2022 4.2  3.5 - 5.1 mmol/L Final    Chloride 12/16/2022 104  99 - 110 mmol/L Final    CO2 12/16/2022 22  21 - 32 mmol/L Final    Anion Gap 12/16/2022 13  3 - 16 Final    Glucose 12/16/2022 124 (A)  70 - 99 mg/dL Final    BUN 12/16/2022 17  7 - 20 mg/dL Final    Creatinine 12/16/2022 0.9  0.6 - 1.1 mg/dL Final    Est, Glom Filt Rate 12/16/2022 >60  >60 Final    Comment: Pediatric calculator link  [email or web address]_[email or web address]  Effective Oct 3, 2022  These results are not intended for use in patients  <25years of age. eGFR results are calculated without  a race factor using the 2021 CKD-EPI equation. Careful  clinical correlation is recommended, particularly when  comparing to results calculated using previous equations. The CKD-EPI equation is less accurate in patients with  extremes of muscle mass, extra-renal metabolism of  creatinine, excessive creatinine ingestion, or following  therapy that affects renal tubular secretion. Calcium 12/16/2022 8.9  8.3 - 10.6 mg/dL Final    Total Protein 12/16/2022 6.6  6.4 - 8.2 g/dL Final    Albumin 12/16/2022 4.1  3.4 - 5.0 g/dL Final    Albumin/Globulin Ratio 12/16/2022 1.6  1.1 - 2.2 Final    Total Bilirubin 12/16/2022 0.3  0.0 - 1.0 mg/dL Final    Alkaline Phosphatase 12/16/2022 90  40 - 129 U/L Final    ALT 12/16/2022 14  10 - 40 U/L Final    AST 12/16/2022 15  15 - 37 U/L Final    Vit D, 25-Hydroxy 12/16/2022 25.1 (A)  >=30 ng/mL Final    Comment: <=20 ng/mL. ........... [place] Hidden Deficient  21-29 ng/mL. ......... [place] Hidden Insufficient  >=30 ng/mL. ........ [place] Hidden Sufficient      Vitamin B-12 12/16/2022 972 (A)  211 - 911 pg/mL Final    Folate 12/16/2022 11.36  4.78 - 24.20 ng/mL Final    Comment: Effective 11-15-16 10:00am EST  Please note reference ranges have  changed for Folate. TSH 12/16/2022 1.49  0.27 - 4.20 uIU/mL Final         Assessment and Plan:  1. Morbid obesity with BMI of 40.0-44.9, adult (HCC)  Stable, not at goal.  Reviewed available tx options- aom, Optifast.  Interested in continuing with lifestyle management for now. 2. Dietary counseling and surveillance  Low carb/alejandrina meal plan as prescribed. Avoid skipping meals. Avoid evening/nighttime snacking.    Use distraction techniques to avoid boredom/stress eating. Plan/prep meals ahead of time. Avoid soda/juice/sugary drinks. Be mindful of portion sizes. 3. Prediabetes  Encouraged low carb/alejandrina diet and exercise. Start metformin 500 mg daily. Counseled on proper use and potential side effects. Check labs again in 3-6 months. 4. Other hyperlipidemia  Encouraged therapeutic lifestyle management. 5. Vitamin D insufficiency  Start Vit D 3 2,000 IU daily. Nutrition plan: [] LCHF/Ketogenic   [x] Modified low-calorie diet (low carb/low-alejandrina)               [] Low-calorie diet    []Maintenance       []Other    Exercise: [x]Cardio     [x]Resistance/strength training                       [x]ACSM recommendations (150 minutes/week in active weight loss)                              Behavior: [x]Motivational interviewing performed    [] Referral for counseling                         [x] Discussed strategies to overcome habits/challenges for focus         [] Stress management   [x] Stimulus control                    [] Sleep hygiene    Reviewed:  [x] Nutrition and the importance of regular protein intake  [x] Hidden carbohydrate sources  [x] Alcohol use  [x] Tobacco use   [x] Importance of exercise and reducing sedentary time              No orders of the defined types were placed in this encounter. No follow-ups on file. Anyi Kurtz is a 46 y.o. female being evaluated by a Virtual Visit (video visit) encounter to address concerns as mentioned above. A caregiver was present when appropriate. Due to this being a TeleHealth encounter (During [place] [address] public health emergency), evaluation of the following organ systems was limited: Vitals/Constitutional/EENT/Resp/CV/GI//MS/Neuro/Skin/Heme-Lymph-Imm.   Pursuant to the emergency declaration under the [facility], [address] [place] [address] authority and the [employer] and [employer] Act, this Virtual Visit was conducted with patient's (and/or legal guardian's) consent, to reduce the patient's risk of exposure to COVID-19 and provide necessary medical care. The patient (and/or legal guardian) has also been advised to contact this office for worsening conditions or problems, and seek emergency medical treatment and/or call 911 if deemed necessary. Services were provided through a video synchronous discussion virtually to substitute for in-person clinic visit. Patient and provider were located at their individual homes. --Jacinta Jones MD on 12/22/2022 at 5:13 PM    An electronic signature was used to authenticate this note. Anitha Chau

## 2022-12-23 VITALS — HEIGHT: 64 IN | BODY MASS INDEX: 44.39 KG/M2 | WEIGHT: 260 LBS

## 2023-01-13 ENCOUNTER — HOSPITAL ENCOUNTER (OUTPATIENT)
Dept: WOMENS IMAGING | Age: 53
Discharge: HOME | End: 2023-01-13
Payer: COMMERCIAL

## 2023-01-13 DIAGNOSIS — Z12.31 ENCOUNTER FOR SCREENING MAMMOGRAM FOR MALIGNANT NEOPLASM OF BREAST: ICD-10-CM

## 2023-01-13 PROCEDURE — 77067 SCR MAMMO BI INCL CAD: CPT

## 2023-01-21 ENCOUNTER — TELEMEDICINE (OUTPATIENT)
Dept: BARIATRICS/WEIGHT MGMT | Age: 53
End: 2023-01-21
Payer: COMMERCIAL

## 2023-01-21 DIAGNOSIS — Z71.3 DIETARY COUNSELING AND SURVEILLANCE: ICD-10-CM

## 2023-01-21 DIAGNOSIS — E66.01 MORBID OBESITY WITH BMI OF 40.0-44.9, ADULT (HCC): Primary | ICD-10-CM

## 2023-01-21 PROCEDURE — 99213 OFFICE O/P EST LOW 20 MIN: CPT | Performed by: FAMILY MEDICINE

## 2023-01-21 ASSESSMENT — ENCOUNTER SYMPTOMS
DIARRHEA: 0
CHEST TIGHTNESS: 0
EYE PAIN: 0
BLOOD IN STOOL: 0
ABDOMINAL PAIN: 0
NAUSEA: 0
PHOTOPHOBIA: 0
ABDOMINAL DISTENTION: 0
SHORTNESS OF BREATH: 0
CONSTIPATION: 0
WHEEZING: 0
VOMITING: 0
APNEA: 0
CHOKING: 0
COUGH: 0

## 2023-01-21 NOTE — PROGRESS NOTES
Patient: Karen Dunlap                      Encounter Date: 1/21/2023    YOB: 1970               Age: 46 y.o. Chief Complaint   Patient presents with    Weight Management     F/u MWM       Patient identification was verified at the start of the visit. Patient-Reported Vitals 1/21/2023   Patient-Reported Weight 260   Patient-Reported Height 5\"4   Patient-Reported Systolic -   Patient-Reported Diastolic -   Patient-Reported Pulse -   Patient-Reported Temperature -         BP Readings from Last 1 Encounters:   11/16/22 126/78       BMI Readings from Last 1 Encounters:   12/23/22 44.63 kg/m²       Pulse Readings from Last 1 Encounters:   11/16/22 98     Wt Readings from Last 3 Encounters:   12/23/22 260 lb (117.9 kg)   11/16/22 258 lb 3.2 oz (117.1 kg)   09/20/22 254 lb (115.2 kg)           HPI: 46 y.o. female with a long-standing history of obesity presents today for a virtual video follow-up. Her weight is stable from her last visit. Trying to follow low carb/alejandrina diet. Started metformin. No side effects. Staying physically active. Interested in starting exercise program at the [address] [place] [address]. Diet: []LCHF/Ketogenic [x]Modified low-calorie/low carb diet  []Low-calorie diet          []Maintenance       []Other:         Adherent?  Somewhat           Allergies   Allergen Reactions    Lisinopril          Current Outpatient Medications:     metFORMIN (GLUCOPHAGE) 500 MG tablet, Take 1 tablet by mouth daily (with breakfast), Disp: 30 tablet, Rfl: 5    acetaZOLAMIDE (DIAMOX) 250 MG tablet, TAKE 1 TABLET TWICE A DAY, Disp: 180 tablet, Rfl: 1    rosuvastatin (CRESTOR) 20 MG tablet, Take 1 tablet by mouth daily, Disp: 90 tablet, Rfl: 2    olmesartan-hydroCHLOROthiazide (BENICAR HCT) 40-25 MG per tablet, TAKE 1 TABLET DAILY, Disp: 90 tablet, Rfl: 3    cetirizine (ZYRTEC) 10 MG tablet, Take 1 tablet by mouth daily, Disp: 30 tablet, Rfl: 5    fluticasone (FLONASE) 50 MCG/ACT nasal spray, 2 sprays by Each Nostril route daily, Disp: 48 g, Rfl: 1    aspirin 81 MG EC tablet, Take 1 tablet by mouth daily, Disp: 30 tablet, Rfl: 3    estradiol (VIVELLE) 0.05 MG/24HR, Place 1 patch onto the skin, Disp: , Rfl:     MULTIPLE VITAMIN PO, Take by mouth, Disp: , Rfl:     Omega-3 Fatty Acids (FISH OIL PO), Take by mouth, Disp: , Rfl:     VITAMIN D PO, Take by mouth, Disp: , Rfl:     UNABLE TO FIND, Black Seed Oil, Disp: , Rfl:     Patient Active Problem List   Diagnosis    Essential hypertension    Mixed hyperlipidemia    Morbid obesity with BMI of 40.0-44.9, adult (HCC)    Pseudotumor cerebri    Benign intracranial hypertension    Palpitations    PVC (premature ventricular contraction)    Mild persistent asthma    Obstructive sleep apnea syndrome    Adenomatous polyp of cecum    Adenomatous polyp of ascending colon    Diverticulosis of colon without hemorrhage    Internal hemorrhoids without complication    Chest pain    Left arm numbness       Review of Systems   Constitutional:  Negative for fatigue.   Eyes:  Negative for photophobia, pain and visual disturbance.   Respiratory:  Negative for apnea, cough, choking, chest tightness, shortness of breath and wheezing.    Cardiovascular:  Negative for chest pain, palpitations and leg swelling.   Gastrointestinal:  Negative for abdominal distention, abdominal pain, blood in stool, constipation, diarrhea, nausea and vomiting.   Endocrine: Negative for cold intolerance and heat intolerance.   Musculoskeletal:  Negative for arthralgias and myalgias.   Skin:  Negative for rash.   Neurological:  Negative for dizziness, tremors, syncope, weakness, numbness and headaches.   Psychiatric/Behavioral:  Negative for agitation, confusion, decreased concentration, dysphoric mood, hallucinations, sleep disturbance and suicidal ideas. The patient is not nervous/anxious and is not hyperactive.      Physical Exam  Constitutional:       Appearance: She is well-developed.   HENT:      Head:  Normocephalic. Eyes:      Conjunctiva/sclera: Conjunctivae normal.   Abdominal:      General: Abdomen is protuberant. Musculoskeletal:         General: No swelling. Neurological:      Mental Status: She is alert and oriented to person, place, and time. Psychiatric:         Mood and Affect: Mood normal.         Behavior: Behavior normal.         Thought Content: Thought content normal.         Judgment: Judgment normal.       Orders Only on 12/16/2022   Component Date Value Ref Range Status    WBC 12/16/2022 5.9  4.0 - 11.0 K/uL Final    RBC 12/16/2022 4.05  4.00 - 5.20 M/uL Final    Hemoglobin 12/16/2022 12.9  12.0 - 16.0 g/dL Final    Hematocrit 12/16/2022 40.4  36.0 - 48.0 % Final    MCV 12/16/2022 99.9  80.0 - 100.0 fL Final    MCH 12/16/2022 31.9  26.0 - 34.0 pg Final    MCHC 12/16/2022 31.9  31.0 - 36.0 g/dL Final    RDW 12/16/2022 14.9  12.4 - 15.4 % Final    Platelets 54/41/4119 234  135 - 450 K/uL Final    MPV 12/16/2022 8.1  5.0 - 10.5 fL Final    Cholesterol, Total 12/16/2022 192  0 - 199 mg/dL Final    Triglycerides 12/16/2022 125  0 - 150 mg/dL Final    HDL 12/16/2022 37 (A)  40 - 60 mg/dL Final    Comment: An HDL cholesterol less than 40 mg/dL is low and  constitutes a coronary heart disease risk factor. An HDL cholesterol greater than 60 mg/dL is a  negative risk factor for coronary heart disease.       LDL Calculated 12/16/2022 130 (A)  <100 mg/dL Final    VLDL Cholesterol Calculated 12/16/2022 25  Not Established mg/dL Final    Hemoglobin A1C 12/16/2022 6.1  See comment % Final    Comment: Comment:  Diagnosis of Diabetes: > or = 6.5%  Increased risk of diabetes (Prediabetes): 5.7-6.4%  Glycemic Control: Nonpregnant Adults: <7.0%                    Pregnant: <6.0%        eAG 12/16/2022 128.4  mg/dL Final    Sodium 12/16/2022 139  136 - 145 mmol/L Final    Potassium 12/16/2022 4.2  3.5 - 5.1 mmol/L Final    Chloride 12/16/2022 104  99 - 110 mmol/L Final    CO2 12/16/2022 22  21 - 32 mmol/L Final    Anion Gap 12/16/2022 13  3 - 16 Final    Glucose 12/16/2022 124 (A)  70 - 99 mg/dL Final    BUN 12/16/2022 17  7 - 20 mg/dL Final    Creatinine 12/16/2022 0.9  0.6 - 1.1 mg/dL Final    Est, Glom Filt Rate 12/16/2022 >60  >60 Final    Comment: Pediatric calculator link  [email or web address]_[email or web address]  Effective Oct 3, 2022  These results are not intended for use in patients  <25years of age. eGFR results are calculated without  a race factor using the 2021 CKD-EPI equation. Careful  clinical correlation is recommended, particularly when  comparing to results calculated using previous equations. The CKD-EPI equation is less accurate in patients with  extremes of muscle mass, extra-renal metabolism of  creatinine, excessive creatinine ingestion, or following  therapy that affects renal tubular secretion. Calcium 12/16/2022 8.9  8.3 - 10.6 mg/dL Final    Total Protein 12/16/2022 6.6  6.4 - 8.2 g/dL Final    Albumin 12/16/2022 4.1  3.4 - 5.0 g/dL Final    Albumin/Globulin Ratio 12/16/2022 1.6  1.1 - 2.2 Final    Total Bilirubin 12/16/2022 0.3  0.0 - 1.0 mg/dL Final    Alkaline Phosphatase 12/16/2022 90  40 - 129 U/L Final    ALT 12/16/2022 14  10 - 40 U/L Final    AST 12/16/2022 15  15 - 37 U/L Final    Vit D, 25-Hydroxy 12/16/2022 25.1 (A)  >=30 ng/mL Final    Comment: <=20 ng/mL. ........... [place] Hilt Deficient  21-29 ng/mL. ......... [place] Hilt Insufficient  >=30 ng/mL. ........ [place] Hilt Sufficient      Vitamin B-12 12/16/2022 972 (A)  211 - 911 pg/mL Final    Folate 12/16/2022 11.36  4.78 - 24.20 ng/mL Final    Comment: Effective 11-15-16 10:00am EST  Please note reference ranges have  changed for Folate. TSH 12/16/2022 1.49  0.27 - 4.20 uIU/mL Final         Assessment and Plan:  1. Morbid obesity with BMI of 40.0-44.9, adult (HCC)  Stable, not at goal.  Encouraged low carb/alejandrina meal plan as prescribed by dietitian. [facility] access provided to start personal training.   F/u 8-10 weeks for dietary counseling. 2. Dietary counseling and surveillance  Low carb/alejandrina meal plan. Avoid skipping meals. Plan/prep meals ahead of time. Avoid soda/juice/sugary drinks. Be mindful of portion sizes. Nutrition plan: [] LCHF/Ketogenic   [] Modified low-calorie diet (low carb/low-alejandrina)               [] Low-calorie diet    []Maintenance       []Other    Exercise: [x]Cardio     [x]Resistance/strength training                       [x]ACSM recommendations (150 minutes/week in active weight loss)                              Behavior: [x]Motivational interviewing performed    [] Referral for counseling                         [] Discussed strategies to overcome habits/challenges for focus         [] Stress management   [x] Stimulus control                    [] Sleep hygiene    Reviewed:  [x] Nutrition and the importance of regular protein intake  [x] Hidden carbohydrate sources  [x] Alcohol use  [x] Tobacco use   [x] Importance of exercise and reducing sedentary time            No orders of the defined types were placed in this encounter. No follow-ups on file. Lucas Kaufman is a 46 y.o. female being evaluated by a Virtual Visit (video visit) encounter to address concerns as mentioned above. A caregiver was present when appropriate. Due to this being a TeleHealth encounter (During [facility] public health emergency), evaluation of the following organ systems was limited: Vitals/Constitutional/EENT/Resp/CV/GI//MS/Neuro/Skin/Heme-Lymph-Imm. Pursuant to the emergency declaration under the [address] [place] [address], [address] [place] [address] and the [email or web address] and [employer] Act, this Virtual Visit was conducted with patient's (and/or legal guardian's) consent, to reduce the patient's risk of exposure to COVID-19 and provide necessary medical care.   The patient (and/or legal guardian) has also been advised to contact this office for worsening conditions or problems, and seek emergency medical treatment and/or call 911 if deemed necessary. Services were provided through a video synchronous discussion virtually to substitute for in-person clinic visit. Patient and provider were located at their individual homes. --Tracy Salinas MD on 1/21/2023 at 2:37 PM    An electronic signature was used to authenticate this note.

## 2023-01-23 ENCOUNTER — TELEPHONE (OUTPATIENT)
Dept: BARIATRICS/WEIGHT MGMT | Age: 53
End: 2023-01-23

## 2023-01-23 NOTE — TELEPHONE ENCOUNTER
Called pt and left message reviewing the protein bar and shakes handouts. Emailed handouts to pt. Pt encouraged to call with any questions or concerns.

## 2023-04-24 DIAGNOSIS — G93.2 PSEUDOTUMOR CEREBRI: ICD-10-CM

## 2023-04-24 RX ORDER — ACETAZOLAMIDE 250 MG/1
TABLET ORAL
Qty: 180 TABLET | Refills: 3 | OUTPATIENT
Start: 2023-04-24

## 2023-04-26 DIAGNOSIS — I10 ESSENTIAL HYPERTENSION: Chronic | ICD-10-CM

## 2023-04-26 RX ORDER — OLMESARTAN MEDOXOMIL AND HYDROCHLOROTHIAZIDE 40/25 40; 25 MG/1; MG/1
TABLET ORAL
Qty: 90 TABLET | Refills: 0 | Status: SHIPPED | OUTPATIENT
Start: 2023-04-26

## 2023-04-26 NOTE — TELEPHONE ENCOUNTER
Recent Visits  Date Type Provider Dept   08/26/22 Office Visit Humberto Delaney MD [facility]   07/25/22 Office Visit Humberto Delaney MD [facility]   03/31/22 Office Visit Humberto Delaney MD [facility]   12/15/21 Office Visit Humberto Delaney MD [facility]   Showing recent visits within past 540 days with a meds authorizing provider and meeting all other requirements  Future Appointments  No visits were found meeting these conditions.   Showing future appointments within next 150 days with a meds authorizing provider and meeting all other requirements    826/2022

## 2023-06-18 ENCOUNTER — PATIENT MESSAGE (OUTPATIENT)
Dept: INTERNAL MEDICINE CLINIC | Age: 53
End: 2023-06-18

## 2023-06-18 DIAGNOSIS — J40 BRONCHITIS: Primary | ICD-10-CM

## 2023-06-18 RX ORDER — DOXYCYCLINE HYCLATE 100 MG
100 TABLET ORAL 2 TIMES DAILY
Qty: 14 TABLET | Refills: 0 | Status: SHIPPED | OUTPATIENT
Start: 2023-06-18 | End: 2023-06-25

## 2023-06-19 RX ORDER — ALBUTEROL SULFATE 90 UG/1
2 AEROSOL, METERED RESPIRATORY (INHALATION) 4 TIMES DAILY PRN
Qty: 16 G | Refills: 1 | Status: SHIPPED | OUTPATIENT
Start: 2023-06-19

## 2023-06-19 NOTE — TELEPHONE ENCOUNTER
From: Roberth Manzo  To: Dr. Angi Dee  Sent: 6/18/2023 1:18 AM EDT  Subject: Chronic cough with mucus    I have a cough for 2 wks with green mucus. Will you please provide a prescription.

## 2023-06-22 RX ORDER — DEXTROMETHORPHAN HYDROBROMIDE AND PROMETHAZINE HYDROCHLORIDE 15; 6.25 MG/5ML; MG/5ML
5 SYRUP ORAL 4 TIMES DAILY PRN
Qty: 240 ML | Refills: 0 | Status: SHIPPED | OUTPATIENT
Start: 2023-06-22 | End: 2023-06-29

## 2023-06-26 ENCOUNTER — OFFICE VISIT (OUTPATIENT)
Dept: NEUROLOGY | Age: 53
End: 2023-06-26
Payer: COMMERCIAL

## 2023-06-26 VITALS
BODY MASS INDEX: 44.39 KG/M2 | HEART RATE: 78 BPM | HEIGHT: 64 IN | WEIGHT: 260 LBS | DIASTOLIC BLOOD PRESSURE: 88 MMHG | SYSTOLIC BLOOD PRESSURE: 130 MMHG

## 2023-06-26 DIAGNOSIS — G93.2 PSEUDOTUMOR CEREBRI: Primary | ICD-10-CM

## 2023-06-26 DIAGNOSIS — E66.01 MORBID OBESITY (HCC): ICD-10-CM

## 2023-06-26 PROCEDURE — 3075F SYST BP GE 130 - 139MM HG: CPT | Performed by: PSYCHIATRY & NEUROLOGY

## 2023-06-26 PROCEDURE — 3079F DIAST BP 80-89 MM HG: CPT | Performed by: PSYCHIATRY & NEUROLOGY

## 2023-06-26 PROCEDURE — 99213 OFFICE O/P EST LOW 20 MIN: CPT | Performed by: PSYCHIATRY & NEUROLOGY

## 2023-07-03 RX ORDER — ROSUVASTATIN CALCIUM 20 MG/1
TABLET, FILM COATED ORAL
Qty: 90 TABLET | Refills: 3 | Status: SHIPPED | OUTPATIENT
Start: 2023-07-03

## 2023-07-03 NOTE — TELEPHONE ENCOUNTER
Recent Visits  Date Type Provider Dept   08/26/22 Office Visit Urvashi Mondragon MD [facility]   07/25/22 Office Visit Urvashi Mondragon MD [facility]   03/31/22 Office Visit Urvashi Mondragon MD [facility]   Showing recent visits within past 540 days with a meds authorizing provider and meeting all other requirements  Future Appointments  Date Type Provider Dept   09/29/23 Appointment Urvashi Mondragon MD [facility]   Showing future appointments within next 150 days with a meds authorizing provider and meeting all other requirements     8/26/2022

## 2023-07-12 ENCOUNTER — PATIENT MESSAGE (OUTPATIENT)
Dept: INTERNAL MEDICINE CLINIC | Age: 53
End: 2023-07-12

## 2023-07-13 NOTE — TELEPHONE ENCOUNTER
From: Shaina Vale  To: Dr. Venecia Navarro  Sent: 7/12/2023 8:45 PM EDT  Subject: Cough    Dr Claudeen Oram I still have that dry cough. Now it seems my ankles keep swollen and short of breathe. I don't feel bad though. I am currently in [place] but return Friday. Is there a way I can get in there to see you after I return?

## 2023-07-18 ENCOUNTER — OFFICE VISIT (OUTPATIENT)
Dept: INTERNAL MEDICINE CLINIC | Age: 53
End: 2023-07-18
Payer: COMMERCIAL

## 2023-07-18 VITALS
BODY MASS INDEX: 46.35 KG/M2 | WEIGHT: 270 LBS | OXYGEN SATURATION: 98 % | HEART RATE: 96 BPM | SYSTOLIC BLOOD PRESSURE: 122 MMHG | DIASTOLIC BLOOD PRESSURE: 80 MMHG

## 2023-07-18 DIAGNOSIS — J45.30 MILD PERSISTENT EXTRINSIC ASTHMA WITHOUT COMPLICATION: Primary | ICD-10-CM

## 2023-07-18 PROCEDURE — 99213 OFFICE O/P EST LOW 20 MIN: CPT | Performed by: NURSE PRACTITIONER

## 2023-07-18 PROCEDURE — 3074F SYST BP LT 130 MM HG: CPT | Performed by: NURSE PRACTITIONER

## 2023-07-18 PROCEDURE — 3078F DIAST BP <80 MM HG: CPT | Performed by: NURSE PRACTITIONER

## 2023-07-18 RX ORDER — MONTELUKAST SODIUM 10 MG/1
10 TABLET ORAL NIGHTLY
Qty: 90 TABLET | Refills: 1 | Status: SHIPPED | OUTPATIENT
Start: 2023-07-18

## 2023-07-18 ASSESSMENT — ANXIETY QUESTIONNAIRES
GAD7 TOTAL SCORE: 0
IF YOU CHECKED OFF ANY PROBLEMS ON THIS QUESTIONNAIRE, HOW DIFFICULT HAVE THESE PROBLEMS MADE IT FOR YOU TO DO YOUR WORK, TAKE CARE OF THINGS AT HOME, OR GET ALONG WITH OTHER PEOPLE: NOT DIFFICULT AT ALL
5. BEING SO RESTLESS THAT IT IS HARD TO SIT STILL: 0
7. FEELING AFRAID AS IF SOMETHING AWFUL MIGHT HAPPEN: 0
3. WORRYING TOO MUCH ABOUT DIFFERENT THINGS: 0
2. NOT BEING ABLE TO STOP OR CONTROL WORRYING: 0
1. FEELING NERVOUS, ANXIOUS, OR ON EDGE: 0
6. BECOMING EASILY ANNOYED OR IRRITABLE: 0
4. TROUBLE RELAXING: 0

## 2023-07-18 ASSESSMENT — PATIENT HEALTH QUESTIONNAIRE - PHQ9
SUM OF ALL RESPONSES TO PHQ QUESTIONS 1-9: 0
SUM OF ALL RESPONSES TO PHQ9 QUESTIONS 1 & 2: 0
1. LITTLE INTEREST OR PLEASURE IN DOING THINGS: 0
2. FEELING DOWN, DEPRESSED OR HOPELESS: 0
SUM OF ALL RESPONSES TO PHQ QUESTIONS 1-9: 0

## 2023-07-18 ASSESSMENT — ASTHMA QUESTIONNAIRES
QUESTION_4 LAST FOUR WEEKS HOW OFTEN HAVE YOU USED YOUR RESCUE INHALER OR NEBULIZER MEDICATION (SUCH AS ALBUTEROL): 2
ACT_TOTALSCORE: 15
QUESTION_1 LAST FOUR WEEKS HOW MUCH OF THE TIME DID YOUR ASTHMA KEEP YOU FROM GETTING AS MUCH DONE AT WORK, SCHOOL OR AT HOME: 5
QUESTION_5 LAST FOUR WEEKS HOW WOULD YOU RATE YOUR ASTHMA CONTROL: 4
QUESTION_3 LAST FOUR WEEKS HOW OFTEN DID YOUR ASTHMA SYMPTOMS (WHEEZING, COUGHING, SHORTNESS OF BREATH, CHEST TIGHTNESS OR PAIN) WAKE YOU UP AT NIGHT OR EARLIER THAN USUAL IN THE MORNING: 2
QUESTION_2 LAST FOUR WEEKS HOW OFTEN HAVE YOU HAD SHORTNESS OF BREATH: 2

## 2023-07-18 ASSESSMENT — ENCOUNTER SYMPTOMS
EYES NEGATIVE: 1
GASTROINTESTINAL NEGATIVE: 1
SHORTNESS OF BREATH: 1
ALLERGIC/IMMUNOLOGIC NEGATIVE: 1
COUGH: 1

## 2023-07-18 NOTE — PATIENT INSTRUCTIONS
Take zyrtec each night  Gargle with warm salt and water  Take Singulair each night  Place humidifier in home in [place]  Vacuum and dust daily

## 2023-07-18 NOTE — PROGRESS NOTES
Jennifer Lei (:  1970) is a 48 y.o. female,Established patient, here for evaluation of the following chief complaint(s):  Cough and Edema (Bilateral ankle swelling/ x 2-3 weeks)         ASSESSMENT/PLAN:    Marjan Londono was seen today for cough and edema. Diagnoses and all orders for this visit:    Mild persistent extrinsic asthma without complication  -     montelukast (SINGULAIR) 10 MG tablet; Take 1 tablet by mouth nightly     Take zyrtec each night  Gargle with warm salt and water  Take Singulair each night  Place humidifier in home in [place]  Vacuum and dust daily           Subjective   SUBJECTIVE/OBJECTIVE:  HPI  Presents today with a lingering cough following bronchitis for the last 6 weeks. At present using albuterol and cough medication    Review of Systems   Constitutional: Negative. HENT: Negative. Eyes: Negative. Respiratory:  Positive for cough and shortness of breath. Cardiovascular: Negative. Gastrointestinal: Negative. Endocrine: Negative. Genitourinary: Negative. Musculoskeletal: Negative. Allergic/Immunologic: Negative. Neurological: Negative. Hematological: Negative. Psychiatric/Behavioral: Negative. Vitals:    23 1423   BP: 122/80   Pulse: 96   SpO2: 98%      BP Readings from Last 3 Encounters:   23 122/80   23 130/88   22 126/78      Wt Readings from Last 3 Encounters:   23 270 lb (122.5 kg)   23 260 lb (117.9 kg)   22 260 lb (117.9 kg)      Objective   Physical Exam  Constitutional:       Appearance: Normal appearance. She is obese. HENT:      Head: Normocephalic. Right Ear: Hearing, tympanic membrane and external ear normal.      Left Ear: Hearing, tympanic membrane and external ear normal.      Ears:      Comments: Erythema of canals noted     Nose: Nose normal.      Mouth/Throat:      Mouth: Mucous membranes are moist.      Pharynx: Uvula midline. Oropharyngeal exudate present.       Comments:

## 2023-07-19 ENCOUNTER — TELEPHONE (OUTPATIENT)
Dept: INTERNAL MEDICINE CLINIC | Age: 53
End: 2023-07-19

## 2023-07-19 RX ORDER — SULFAMETHOXAZOLE AND TRIMETHOPRIM 800; 160 MG/1; MG/1
1 TABLET ORAL 2 TIMES DAILY
Qty: 14 TABLET | Refills: 0 | Status: SHIPPED | OUTPATIENT
Start: 2023-07-19 | End: 2023-07-26

## 2023-07-19 NOTE — TELEPHONE ENCOUNTER
Pt feels she had an UTI and would like to have prescription called into pharmacy if posible. [facility] on [address]. She has had it before and it started this morning. Please advise.

## 2023-07-25 DIAGNOSIS — I10 ESSENTIAL HYPERTENSION: Chronic | ICD-10-CM

## 2023-07-25 RX ORDER — OLMESARTAN MEDOXOMIL AND HYDROCHLOROTHIAZIDE 40/25 40; 25 MG/1; MG/1
TABLET ORAL
Qty: 90 TABLET | Refills: 3 | OUTPATIENT
Start: 2023-07-25

## 2023-09-29 ENCOUNTER — OFFICE VISIT (OUTPATIENT)
Dept: INTERNAL MEDICINE CLINIC | Age: 53
End: 2023-09-29
Payer: COMMERCIAL

## 2023-09-29 VITALS
OXYGEN SATURATION: 98 % | WEIGHT: 268 LBS | DIASTOLIC BLOOD PRESSURE: 80 MMHG | SYSTOLIC BLOOD PRESSURE: 132 MMHG | BODY MASS INDEX: 45.75 KG/M2 | HEART RATE: 75 BPM | HEIGHT: 64 IN

## 2023-09-29 DIAGNOSIS — I10 ESSENTIAL HYPERTENSION: ICD-10-CM

## 2023-09-29 DIAGNOSIS — E78.2 MIXED HYPERLIPIDEMIA: ICD-10-CM

## 2023-09-29 DIAGNOSIS — R73.9 HYPERGLYCEMIA: ICD-10-CM

## 2023-09-29 DIAGNOSIS — I10 ESSENTIAL HYPERTENSION: Primary | ICD-10-CM

## 2023-09-29 DIAGNOSIS — G93.2 PSEUDOTUMOR CEREBRI: ICD-10-CM

## 2023-09-29 LAB
ALBUMIN SERPL-MCNC: 4.6 G/DL (ref 3.4–5)
ALBUMIN/GLOB SERPL: 1.6 {RATIO} (ref 1.1–2.2)
ALP SERPL-CCNC: 90 U/L (ref 40–129)
ALT SERPL-CCNC: 18 U/L (ref 10–40)
ANION GAP SERPL CALCULATED.3IONS-SCNC: 12 MMOL/L (ref 3–16)
AST SERPL-CCNC: 22 U/L (ref 15–37)
BASOPHILS # BLD: 0 K/UL (ref 0–0.2)
BASOPHILS NFR BLD: 0.4 %
BILIRUB SERPL-MCNC: <0.2 MG/DL (ref 0–1)
BILIRUB UR QL STRIP.AUTO: NEGATIVE
BUN SERPL-MCNC: 15 MG/DL (ref 7–20)
CALCIUM SERPL-MCNC: 9.1 MG/DL (ref 8.3–10.6)
CHLORIDE SERPL-SCNC: 104 MMOL/L (ref 99–110)
CHOLEST SERPL-MCNC: 189 MG/DL (ref 0–199)
CLARITY UR: CLEAR
CO2 SERPL-SCNC: 23 MMOL/L (ref 21–32)
COLOR UR: YELLOW
CREAT SERPL-MCNC: 1.2 MG/DL (ref 0.6–1.1)
DEPRECATED RDW RBC AUTO: 15.4 % (ref 12.4–15.4)
EOSINOPHIL # BLD: 0 K/UL (ref 0–0.6)
EOSINOPHIL NFR BLD: 0.7 %
GFR SERPLBLD CREATININE-BSD FMLA CKD-EPI: 54 ML/MIN/{1.73_M2}
GLUCOSE SERPL-MCNC: 93 MG/DL (ref 70–99)
GLUCOSE UR STRIP.AUTO-MCNC: NEGATIVE MG/DL
HCT VFR BLD AUTO: 42.2 % (ref 36–48)
HDLC SERPL-MCNC: 38 MG/DL (ref 40–60)
HGB BLD-MCNC: 13.7 G/DL (ref 12–16)
HGB UR QL STRIP.AUTO: NEGATIVE
KETONES UR STRIP.AUTO-MCNC: NEGATIVE MG/DL
LDLC SERPL CALC-MCNC: 127 MG/DL
LEUKOCYTE ESTERASE UR QL STRIP.AUTO: NEGATIVE
LYMPHOCYTES # BLD: 3.7 K/UL (ref 1–5.1)
LYMPHOCYTES NFR BLD: 54.8 %
MCH RBC QN AUTO: 31.9 PG (ref 26–34)
MCHC RBC AUTO-ENTMCNC: 32.4 G/DL (ref 31–36)
MCV RBC AUTO: 98.4 FL (ref 80–100)
MONOCYTES # BLD: 0.6 K/UL (ref 0–1.3)
MONOCYTES NFR BLD: 8.3 %
NEUTROPHILS # BLD: 2.4 K/UL (ref 1.7–7.7)
NEUTROPHILS NFR BLD: 35.8 %
NITRITE UR QL STRIP.AUTO: NEGATIVE
PH UR STRIP.AUTO: 7 [PH] (ref 5–8)
PLATELET # BLD AUTO: 269 K/UL (ref 135–450)
PMV BLD AUTO: 8.5 FL (ref 5–10.5)
POTASSIUM SERPL-SCNC: 3.8 MMOL/L (ref 3.5–5.1)
PROT SERPL-MCNC: 7.5 G/DL (ref 6.4–8.2)
PROT UR STRIP.AUTO-MCNC: NEGATIVE MG/DL
RBC # BLD AUTO: 4.29 M/UL (ref 4–5.2)
SODIUM SERPL-SCNC: 139 MMOL/L (ref 136–145)
SP GR UR STRIP.AUTO: 1.01 (ref 1–1.03)
TRIGL SERPL-MCNC: 120 MG/DL (ref 0–150)
TSH SERPL DL<=0.005 MIU/L-ACNC: 1.48 UIU/ML (ref 0.27–4.2)
UA DIPSTICK W REFLEX MICRO PNL UR: NORMAL
URN SPEC COLLECT METH UR: NORMAL
UROBILINOGEN UR STRIP-ACNC: 0.2 E.U./DL
VLDLC SERPL CALC-MCNC: 24 MG/DL
WBC # BLD AUTO: 6.7 K/UL (ref 4–11)

## 2023-09-29 PROCEDURE — 3074F SYST BP LT 130 MM HG: CPT | Performed by: INTERNAL MEDICINE

## 2023-09-29 PROCEDURE — 3078F DIAST BP <80 MM HG: CPT | Performed by: INTERNAL MEDICINE

## 2023-09-29 PROCEDURE — 99214 OFFICE O/P EST MOD 30 MIN: CPT | Performed by: INTERNAL MEDICINE

## 2023-09-29 RX ORDER — ACETAZOLAMIDE 250 MG/1
TABLET ORAL
Qty: 180 TABLET | Refills: 1 | Status: SHIPPED | OUTPATIENT
Start: 2023-09-29

## 2023-09-30 LAB
EST. AVERAGE GLUCOSE BLD GHB EST-MCNC: 148.5 MG/DL
HBA1C MFR BLD: 6.8 %

## 2023-10-02 RX ORDER — SEMAGLUTIDE 0.68 MG/ML
0.5 INJECTION, SOLUTION SUBCUTANEOUS WEEKLY
Qty: 3 ML | Refills: 5 | Status: SHIPPED | OUTPATIENT
Start: 2023-10-02 | End: 2023-11-27 | Stop reason: ALTCHOICE

## 2023-10-03 ENCOUNTER — TELEPHONE (OUTPATIENT)
Dept: ADMINISTRATIVE | Age: 53
End: 2023-10-03

## 2023-10-03 NOTE — TELEPHONE ENCOUNTER
The medication was DENIED; DENIAL letter uploaded to MED[employer]. If you want an APPEAL; please note in this encounter what new information you would like to APPEAL with. Once complete route back to PA POOL. If this requires a response please respond to the pool ( [phone]). Thank you please advise patient.

## 2023-10-03 NOTE — TELEPHONE ENCOUNTER
Submitted PA for Ozempic (0.25 or 0.5 MG/DOSE) 2MG/3ML pen-injectors  Via CMM Key: [id number] STATUS: PENDING. Follow up done daily; if no response in three days we will refax for status check. If another three days goes by with no response we will call the insurance for status.

## 2023-11-20 ENCOUNTER — OFFICE VISIT (OUTPATIENT)
Dept: NEUROLOGY | Age: 53
End: 2023-11-20
Payer: COMMERCIAL

## 2023-11-20 VITALS
WEIGHT: 264 LBS | DIASTOLIC BLOOD PRESSURE: 91 MMHG | HEART RATE: 100 BPM | BODY MASS INDEX: 45.32 KG/M2 | SYSTOLIC BLOOD PRESSURE: 144 MMHG

## 2023-11-20 DIAGNOSIS — G93.2 PSEUDOTUMOR CEREBRI: ICD-10-CM

## 2023-11-20 PROCEDURE — 99213 OFFICE O/P EST LOW 20 MIN: CPT | Performed by: PSYCHIATRY & NEUROLOGY

## 2023-11-20 PROCEDURE — 3077F SYST BP >= 140 MM HG: CPT | Performed by: PSYCHIATRY & NEUROLOGY

## 2023-11-20 PROCEDURE — 3080F DIAST BP >= 90 MM HG: CPT | Performed by: PSYCHIATRY & NEUROLOGY

## 2023-11-20 RX ORDER — ACETAZOLAMIDE 250 MG/1
TABLET ORAL
Qty: 90 TABLET | Refills: 1 | Status: SHIPPED | OUTPATIENT
Start: 2023-11-20

## 2023-11-20 NOTE — PROGRESS NOTES
continue to lose weight  She will see an ophthalmologist and the neurologist after she moved to early next year and then they can make a decision about taking her off the Diamox slowly  We will see her in this clinic on a as needed basis. Please note a portion of  this chart was generated using dragon dictation software. Although every effort was made to ensure the accuracy of this automated transcription, some errors in transcription may have occurred.

## 2023-11-24 ENCOUNTER — HOSPITAL ENCOUNTER (EMERGENCY)
Age: 53
Discharge: HOME | End: 2023-11-24
Payer: COMMERCIAL

## 2023-11-24 VITALS
TEMPERATURE: 98.2 F | SYSTOLIC BLOOD PRESSURE: 109 MMHG | HEART RATE: 97 BPM | WEIGHT: 268.96 LBS | OXYGEN SATURATION: 95 % | HEIGHT: 64 IN | DIASTOLIC BLOOD PRESSURE: 95 MMHG | RESPIRATION RATE: 16 BRPM | BODY MASS INDEX: 45.92 KG/M2

## 2023-11-24 DIAGNOSIS — M79.89 PAIN AND SWELLING OF LEFT LOWER LEG: Primary | ICD-10-CM

## 2023-11-24 DIAGNOSIS — M79.662 PAIN AND SWELLING OF LEFT LOWER LEG: Primary | ICD-10-CM

## 2023-11-24 LAB
ANION GAP SERPL CALCULATED.3IONS-SCNC: 10 MMOL/L (ref 3–16)
BASOPHILS # BLD: 0.1 K/UL (ref 0–0.2)
BASOPHILS NFR BLD: 0.8 %
BUN SERPL-MCNC: 18 MG/DL (ref 7–20)
CALCIUM SERPL-MCNC: 9.3 MG/DL (ref 8.3–10.6)
CHLORIDE SERPL-SCNC: 107 MMOL/L (ref 99–110)
CO2 SERPL-SCNC: 23 MMOL/L (ref 21–32)
CREAT SERPL-MCNC: 1 MG/DL (ref 0.6–1.1)
D DIMER: 0.95 UG/ML FEU (ref 0–0.6)
DEPRECATED RDW RBC AUTO: 15.2 % (ref 12.4–15.4)
EOSINOPHIL # BLD: 0.1 K/UL (ref 0–0.6)
EOSINOPHIL NFR BLD: 1.1 %
GFR SERPLBLD CREATININE-BSD FMLA CKD-EPI: >60 ML/MIN/{1.73_M2}
GLUCOSE SERPL-MCNC: 137 MG/DL (ref 70–99)
HCT VFR BLD AUTO: 38.6 % (ref 36–48)
HGB BLD-MCNC: 12.5 G/DL (ref 12–16)
LYMPHOCYTES # BLD: 3.6 K/UL (ref 1–5.1)
LYMPHOCYTES NFR BLD: 47.8 %
MCH RBC QN AUTO: 31.9 PG (ref 26–34)
MCHC RBC AUTO-ENTMCNC: 32.4 G/DL (ref 31–36)
MCV RBC AUTO: 98.3 FL (ref 80–100)
MONOCYTES # BLD: 0.7 K/UL (ref 0–1.3)
MONOCYTES NFR BLD: 9.7 %
NEUTROPHILS # BLD: 3 K/UL (ref 1.7–7.7)
NEUTROPHILS NFR BLD: 40.6 %
PLATELET # BLD AUTO: 229 K/UL (ref 135–450)
PMV BLD AUTO: 8.5 FL (ref 5–10.5)
POTASSIUM SERPL-SCNC: 3.7 MMOL/L (ref 3.5–5.1)
RBC # BLD AUTO: 3.93 M/UL (ref 4–5.2)
SODIUM SERPL-SCNC: 140 MMOL/L (ref 136–145)
WBC # BLD AUTO: 7.5 K/UL (ref 4–11)

## 2023-11-24 PROCEDURE — 6370000000 HC RX 637 (ALT 250 FOR IP): Performed by: PHYSICIAN ASSISTANT

## 2023-11-24 PROCEDURE — 99283 EMERGENCY DEPT VISIT LOW MDM: CPT

## 2023-11-24 PROCEDURE — 85025 COMPLETE CBC W/AUTO DIFF WBC: CPT

## 2023-11-24 PROCEDURE — 80048 BASIC METABOLIC PNL TOTAL CA: CPT

## 2023-11-24 PROCEDURE — 85379 FIBRIN DEGRADATION QUANT: CPT

## 2023-11-24 RX ORDER — NAPROXEN 500 MG/1
500 TABLET ORAL 2 TIMES DAILY PRN
Qty: 20 TABLET | Refills: 0 | Status: SHIPPED | OUTPATIENT
Start: 2023-11-24

## 2023-11-24 RX ADMIN — APIXABAN 10 MG: 5 TABLET, FILM COATED ORAL at 20:17

## 2023-11-24 ASSESSMENT — PAIN DESCRIPTION - DESCRIPTORS: DESCRIPTORS: ACHING

## 2023-11-24 ASSESSMENT — PAIN - FUNCTIONAL ASSESSMENT
PAIN_FUNCTIONAL_ASSESSMENT: PREVENTS OR INTERFERES SOME ACTIVE ACTIVITIES AND ADLS
PAIN_FUNCTIONAL_ASSESSMENT: 0-10

## 2023-11-24 ASSESSMENT — PAIN DESCRIPTION - ORIENTATION: ORIENTATION: LOWER;LEFT

## 2023-11-24 ASSESSMENT — PAIN DESCRIPTION - PAIN TYPE: TYPE: ACUTE PAIN

## 2023-11-24 ASSESSMENT — PAIN SCALES - GENERAL: PAINLEVEL_OUTOF10: 7

## 2023-11-24 ASSESSMENT — PAIN DESCRIPTION - LOCATION: LOCATION: LEG

## 2023-11-24 NOTE — ED PROVIDER NOTES
(80 mg Oral Furnished to Patient 11/24/23 2018)           Is this patient to be included in the SEP-1 Core Measure due to severe sepsis or septic shock? No   Exclusion criteria - the patient is NOT to be included for SEP-1 Core Measure due to: Infection is not suspected    Contraindications to therapy  ALL ANSWERS SHOULD BE NO     History of major surgery in the past 48 hours? NO  Patient currently has active bleeding? NO  Patient is pregnant? NO  Existing liver disease or coagulopathy? NO  History of intracranial hemorrhage? NO  Lumbar puncture or epidural anesthesia in past 48 hours NO  History of anaphylaxis to Xa inhibitors NO  History of Chronic Kidney disease NO  Is patient already on coumadin, Xa inhibitor, or heparins NO    All YES Answers    Patient has suspected DVT? YES  Family understands medication is a blood thinner? YES  Patient has prescription for venous duplex of extremity? YES  Understands follow up plan? YES  DVT discharge instruction/plan provided? YES  Personally dispensed medication to patient YES  Compressible common femoral vein on bedside ultrasound YES    All NO Answers    Contraindications to treatment? NO  Clinically suspect pulmonary embolism? NO      Physical exam did show some swelling of the left lower leg. Good neurovascular status. Lab work-up showed normal kidney function, normal white blood cell count. D-dimer was slightly above the normal range, however. Doppler ultrasound unavailable at this time. Patient will be treated for presumed DVT. Was given Eliquis starter pack with the first dose in the ED. Was given an order for ultrasound to be performed as an outpatient. No indication for hospital admission, no signs or symptoms of PE. Patient was discharged. The patient verbalized understanding and agreement with this plan of care.  The patient was advised to return to the emergency department if symptoms should significantly worsen or if new and concerning

## 2023-11-25 NOTE — ED NOTES
Pt discharged back home, reviewed discharged instructions with pt follow up care , pain control and return precautions discussed , pt verbalized understanding.  Pt ambulated out of the department with steady gait          Risa Torres, [address]  11/24/23 2026

## 2023-11-26 ENCOUNTER — TELEPHONE (OUTPATIENT)
Dept: INTERNAL MEDICINE CLINIC | Age: 53
End: 2023-11-26

## 2023-11-26 NOTE — TELEPHONE ENCOUNTER
Seen in ER plans to get doppler on MOnday. has flight on tuesday needs virtual monday mid day. please call patient. okay to double book. she has availability within 30 minutes monday.

## 2023-11-27 ENCOUNTER — ANTI-COAG VISIT (OUTPATIENT)
Dept: PHARMACY | Age: 53
End: 2023-11-27
Payer: COMMERCIAL

## 2023-11-27 ENCOUNTER — OFFICE VISIT (OUTPATIENT)
Dept: INTERNAL MEDICINE CLINIC | Age: 53
End: 2023-11-27
Payer: COMMERCIAL

## 2023-11-27 ENCOUNTER — HOSPITAL ENCOUNTER (OUTPATIENT)
Dept: VASCULAR LAB | Age: 53
Discharge: HOME | End: 2023-11-27
Payer: COMMERCIAL

## 2023-11-27 VITALS
WEIGHT: 264.4 LBS | HEART RATE: 92 BPM | SYSTOLIC BLOOD PRESSURE: 125 MMHG | BODY MASS INDEX: 45.38 KG/M2 | OXYGEN SATURATION: 99 % | DIASTOLIC BLOOD PRESSURE: 80 MMHG

## 2023-11-27 DIAGNOSIS — M79.89 PAIN AND SWELLING OF LEFT LOWER LEG: ICD-10-CM

## 2023-11-27 DIAGNOSIS — I82.402 ACUTE DEEP VEIN THROMBOSIS (DVT) OF LEFT LOWER EXTREMITY, UNSPECIFIED VEIN (HCC): Primary | ICD-10-CM

## 2023-11-27 DIAGNOSIS — M79.662 PAIN AND SWELLING OF LEFT LOWER LEG: ICD-10-CM

## 2023-11-27 PROBLEM — I82.409 DVT (DEEP VENOUS THROMBOSIS) (HCC): Status: ACTIVE | Noted: 2023-11-27

## 2023-11-27 PROCEDURE — 3074F SYST BP LT 130 MM HG: CPT | Performed by: STUDENT IN AN ORGANIZED HEALTH CARE EDUCATION/TRAINING PROGRAM

## 2023-11-27 PROCEDURE — 99211 OFF/OP EST MAY X REQ PHY/QHP: CPT

## 2023-11-27 PROCEDURE — 3079F DIAST BP 80-89 MM HG: CPT | Performed by: STUDENT IN AN ORGANIZED HEALTH CARE EDUCATION/TRAINING PROGRAM

## 2023-11-27 PROCEDURE — 99213 OFFICE O/P EST LOW 20 MIN: CPT | Performed by: STUDENT IN AN ORGANIZED HEALTH CARE EDUCATION/TRAINING PROGRAM

## 2023-11-27 PROCEDURE — 93971 EXTREMITY STUDY: CPT

## 2023-11-27 NOTE — PROGRESS NOTES
Re Loza was recently in the ED with concerns for a DVT. They are here today for a DVT Study to rule out a DVT. The DVT study today was found to be negative. Type of Study: Veins: Lower Extremities DVT Study, VL EXTREMITY VENOUS DUPLEX LEFT. Tech Comments Left No evidence of deep or superficial venous thrombosis involving the left lower extremity and the right common femoral vein. [address] [facility] Debbie returned the remaining Eliquis tablets that were given in the ED as part of the DVT program. These will be disposed of properly. It was recommended that they follow up with their PCP within 7 days. If the patient currently does not have a PCP, they were given a list of [facility] physicians. They were counseled on signs and symptoms of DVT and if symptoms should worsen to seek medical attention and provided basic education. Symptoms of DVT (deep vein thrombosis)  Symptoms of DVT (deep vein thrombosis) in the leg are:  throbbing pain in 1 leg (rarely both legs), usually in the calf or thigh, when walking or standing up  swelling in 1 leg (rarely both legs)  warm skin around the painful area  red or darkened skin around the painful area - this may be harder to see on brown or black skin  swollen veins that are hard or sore when you touch them  These symptoms can also happen in your arm or tummy if that's where the blood clot is.     Electronically signed by Nirmala Tiwari, [address] [facility], PharmD on 11/27/2023 at 2:09 PM    [address] [place] [address]  Anticoagulation Service  DVT Program  [phone]    Billing Points:    [address] [facility] (including but not limited to: vital signs; physical assessment screening; review of secondary markers like lab results, allergies, home readings; instructions on treatment plan and basic education; assessment of medication list with one med change and compliance) - 2 points    CLINICAL PHARMACY CONSULT: MED RECONCILIATION/REVIEW ADDENDUM    For Pharmacy

## 2023-11-27 NOTE — PROGRESS NOTES
Diabetes Mother     Breast Cancer Mother         over 48      Allergies   Allergen Reactions    Lisinopril         Objective   Vitals:    11/27/23 1122   BP: 125/80   Site: Right Upper Arm   Position: Sitting   Cuff Size: Large Adult   Pulse: 92   SpO2: 99%   Weight: 119.9 kg (264 lb 6.4 oz)     Physical Exam  Musculoskeletal:      Comments: Left ankle with swelling  Achilles TTP  No calf TTP  No erythema  Negative pradeep sign          An electronic signature was used to authenticate this note. --Mak Ferguson DO     Documentation was done using voice recognition dragon software. Every effort was made to ensure accuracy; however, inadvertent, unintentional computerized transcription errors may be present. Herb Ruiz

## 2023-12-01 ENCOUNTER — OFFICE VISIT (OUTPATIENT)
Dept: INTERNAL MEDICINE CLINIC | Age: 53
End: 2023-12-01
Payer: COMMERCIAL

## 2023-12-01 VITALS
WEIGHT: 269 LBS | RESPIRATION RATE: 18 BRPM | BODY MASS INDEX: 45.93 KG/M2 | OXYGEN SATURATION: 99 % | HEIGHT: 64 IN | DIASTOLIC BLOOD PRESSURE: 78 MMHG | SYSTOLIC BLOOD PRESSURE: 118 MMHG | TEMPERATURE: 97.6 F | HEART RATE: 97 BPM

## 2023-12-01 DIAGNOSIS — M79.89 FOOT SWELLING: Primary | ICD-10-CM

## 2023-12-01 DIAGNOSIS — M79.89 FOOT SWELLING: ICD-10-CM

## 2023-12-01 LAB
ALBUMIN SERPL-MCNC: 4.1 G/DL (ref 3.4–5)
ALBUMIN/GLOB SERPL: 1.4 {RATIO} (ref 1.1–2.2)
ALP SERPL-CCNC: 84 U/L (ref 40–129)
ALT SERPL-CCNC: 17 U/L (ref 10–40)
ANION GAP SERPL CALCULATED.3IONS-SCNC: 10 MMOL/L (ref 3–16)
AST SERPL-CCNC: 19 U/L (ref 15–37)
BILIRUB SERPL-MCNC: <0.2 MG/DL (ref 0–1)
BUN SERPL-MCNC: 18 MG/DL (ref 7–20)
CALCIUM SERPL-MCNC: 9.2 MG/DL (ref 8.3–10.6)
CHLORIDE SERPL-SCNC: 108 MMOL/L (ref 99–110)
CO2 SERPL-SCNC: 24 MMOL/L (ref 21–32)
CREAT SERPL-MCNC: 1 MG/DL (ref 0.6–1.1)
GFR SERPLBLD CREATININE-BSD FMLA CKD-EPI: >60 ML/MIN/{1.73_M2}
GLUCOSE SERPL-MCNC: 202 MG/DL (ref 70–99)
POTASSIUM SERPL-SCNC: 3.9 MMOL/L (ref 3.5–5.1)
PROT SERPL-MCNC: 7 G/DL (ref 6.4–8.2)
SODIUM SERPL-SCNC: 142 MMOL/L (ref 136–145)
URATE SERPL-MCNC: 7.8 MG/DL (ref 2.6–6)

## 2023-12-01 PROCEDURE — 3074F SYST BP LT 130 MM HG: CPT | Performed by: INTERNAL MEDICINE

## 2023-12-01 PROCEDURE — 99213 OFFICE O/P EST LOW 20 MIN: CPT | Performed by: INTERNAL MEDICINE

## 2023-12-01 PROCEDURE — 3078F DIAST BP <80 MM HG: CPT | Performed by: INTERNAL MEDICINE

## 2023-12-01 ASSESSMENT — PATIENT HEALTH QUESTIONNAIRE - PHQ9
2. FEELING DOWN, DEPRESSED OR HOPELESS: 0
SUM OF ALL RESPONSES TO PHQ QUESTIONS 1-9: 0
1. LITTLE INTEREST OR PLEASURE IN DOING THINGS: 0
SUM OF ALL RESPONSES TO PHQ9 QUESTIONS 1 & 2: 0
SUM OF ALL RESPONSES TO PHQ QUESTIONS 1-9: 0

## 2023-12-01 ASSESSMENT — ANXIETY QUESTIONNAIRES
GAD7 TOTAL SCORE: 0
IF YOU CHECKED OFF ANY PROBLEMS ON THIS QUESTIONNAIRE, HOW DIFFICULT HAVE THESE PROBLEMS MADE IT FOR YOU TO DO YOUR WORK, TAKE CARE OF THINGS AT HOME, OR GET ALONG WITH OTHER PEOPLE: NOT DIFFICULT AT ALL
6. BECOMING EASILY ANNOYED OR IRRITABLE: 0
3. WORRYING TOO MUCH ABOUT DIFFERENT THINGS: 0
4. TROUBLE RELAXING: 0
1. FEELING NERVOUS, ANXIOUS, OR ON EDGE: 0
7. FEELING AFRAID AS IF SOMETHING AWFUL MIGHT HAPPEN: 0
5. BEING SO RESTLESS THAT IT IS HARD TO SIT STILL: 0
2. NOT BEING ABLE TO STOP OR CONTROL WORRYING: 0

## 2024-06-27 RX ORDER — ROSUVASTATIN CALCIUM 20 MG/1
TABLET, FILM COATED ORAL
Qty: 90 TABLET | Refills: 0 | Status: SHIPPED | OUTPATIENT
Start: 2024-06-27

## 2024-06-27 NOTE — TELEPHONE ENCOUNTER
Recent Visits  Date Type Provider Dept   12/01/23 Office Visit Conor Aragon MD [facility]   11/27/23 Office Visit Glenny Shore DO [facility]   09/29/23 Office Visit Conor Aragon MD [facility]   07/18/23 Office Visit Angeli Marroquin, APRN - CNP [facility]   Showing recent visits within past 540 days with a meds authorizing provider and meeting all other requirements  Future Appointments  No visits were found meeting these conditions.  Showing future appointments within next 150 days with a meds authorizing provider and meeting all other requirements     12/1/2023

## 2024-09-25 RX ORDER — ROSUVASTATIN CALCIUM 20 MG/1
TABLET, FILM COATED ORAL
Qty: 90 TABLET | Refills: 3 | Status: SHIPPED | OUTPATIENT
Start: 2024-09-25

## (undated) DEVICE — Z DISCONTINUED USE 2276105 GOWN PROTCT UNIV CHST W28IN L49IN SL 24IN BLU SPUNBOND FLM

## (undated) DEVICE — KIT INF CTRL 2OZ LUB TBNG L12FT DBL END BRSH SYR OP4

## (undated) DEVICE — Device: Brand: DISPOSABLE ELECTROSURGICAL SNARE

## (undated) DEVICE — TRAP SPEC POLYPR SGL CHMBR FN MESH SCRN

## (undated) DEVICE — CANNULA NSL 13FT TUBE AD ETCO2 DIV SAMP M

## (undated) DEVICE — ELECTRODE ECG MONITR FOAM TEAR DROP ADLT RED